# Patient Record
Sex: FEMALE | Race: WHITE | NOT HISPANIC OR LATINO | Employment: OTHER | URBAN - METROPOLITAN AREA
[De-identification: names, ages, dates, MRNs, and addresses within clinical notes are randomized per-mention and may not be internally consistent; named-entity substitution may affect disease eponyms.]

---

## 2017-01-03 RX ORDER — INDACATEROL MALEATE 75 UG/1
CAPSULE ORAL; RESPIRATORY (INHALATION)
Qty: 90 CAP | Refills: 1 | Status: SHIPPED | OUTPATIENT
Start: 2017-01-03 | End: 2017-03-15 | Stop reason: SDUPTHER

## 2017-01-03 NOTE — TELEPHONE ENCOUNTER
Was the patient seen in the last year in this department? Yes     Does patient have an active prescription for medications requested? No     Received Request Via: Pharmacy     Last appt: 3/29/2016    Next appt: none scheduled

## 2017-02-28 ENCOUNTER — HOSPITAL ENCOUNTER (OUTPATIENT)
Dept: LAB | Facility: MEDICAL CENTER | Age: 82
End: 2017-02-28
Attending: NURSE PRACTITIONER
Payer: MEDICARE

## 2017-02-28 ENCOUNTER — TELEPHONE (OUTPATIENT)
Dept: MEDICAL GROUP | Facility: MEDICAL CENTER | Age: 82
End: 2017-02-28

## 2017-02-28 DIAGNOSIS — I10 ESSENTIAL HYPERTENSION, BENIGN: ICD-10-CM

## 2017-02-28 DIAGNOSIS — E11.638: ICD-10-CM

## 2017-02-28 DIAGNOSIS — E78.5 HYPERLIPIDEMIA LDL GOAL <100: ICD-10-CM

## 2017-02-28 DIAGNOSIS — E56.9 VITAMIN DEFICIENCY: ICD-10-CM

## 2017-02-28 LAB
25(OH)D3 SERPL-MCNC: 27 NG/ML (ref 30–100)
ALBUMIN SERPL BCP-MCNC: 4.3 G/DL (ref 3.2–4.9)
ALBUMIN/GLOB SERPL: 1.3 G/DL
ALP SERPL-CCNC: 78 U/L (ref 30–99)
ALT SERPL-CCNC: 21 U/L (ref 2–50)
ANION GAP SERPL CALC-SCNC: 9 MMOL/L (ref 0–11.9)
AST SERPL-CCNC: 19 U/L (ref 12–45)
BILIRUB SERPL-MCNC: 0.5 MG/DL (ref 0.1–1.5)
BUN SERPL-MCNC: 29 MG/DL (ref 8–22)
CALCIUM SERPL-MCNC: 10 MG/DL (ref 8.5–10.5)
CHLORIDE SERPL-SCNC: 107 MMOL/L (ref 96–112)
CHOLEST SERPL-MCNC: 144 MG/DL (ref 100–199)
CO2 SERPL-SCNC: 24 MMOL/L (ref 20–33)
CREAT SERPL-MCNC: 1.14 MG/DL (ref 0.5–1.4)
CREAT UR-MCNC: 144.9 MG/DL
EST. AVERAGE GLUCOSE BLD GHB EST-MCNC: 137 MG/DL
FOLATE SERPL-MCNC: 20.6 NG/ML
GLOBULIN SER CALC-MCNC: 3.3 G/DL (ref 1.9–3.5)
GLUCOSE SERPL-MCNC: 162 MG/DL (ref 65–99)
HBA1C MFR BLD: 6.4 % (ref 0–5.6)
HDLC SERPL-MCNC: 44 MG/DL
LDLC SERPL CALC-MCNC: 52 MG/DL
MICROALBUMIN UR-MCNC: 0.9 MG/DL
MICROALBUMIN/CREAT UR: 6 MG/G (ref 0–30)
POTASSIUM SERPL-SCNC: 4 MMOL/L (ref 3.6–5.5)
PROT SERPL-MCNC: 7.6 G/DL (ref 6–8.2)
SODIUM SERPL-SCNC: 140 MMOL/L (ref 135–145)
TRIGL SERPL-MCNC: 240 MG/DL (ref 0–149)
VIT B12 SERPL-MCNC: >1500 PG/ML (ref 211–911)

## 2017-02-28 PROCEDURE — 80053 COMPREHEN METABOLIC PANEL: CPT

## 2017-02-28 PROCEDURE — 82306 VITAMIN D 25 HYDROXY: CPT | Mod: GA

## 2017-02-28 PROCEDURE — 82607 VITAMIN B-12: CPT

## 2017-02-28 PROCEDURE — 82570 ASSAY OF URINE CREATININE: CPT

## 2017-02-28 PROCEDURE — 36415 COLL VENOUS BLD VENIPUNCTURE: CPT

## 2017-02-28 PROCEDURE — 82746 ASSAY OF FOLIC ACID SERUM: CPT

## 2017-02-28 PROCEDURE — 82043 UR ALBUMIN QUANTITATIVE: CPT

## 2017-02-28 PROCEDURE — 80061 LIPID PANEL: CPT

## 2017-02-28 PROCEDURE — 83036 HEMOGLOBIN GLYCOSYLATED A1C: CPT

## 2017-02-28 NOTE — TELEPHONE ENCOUNTER
----- Message from ALISON Leavitt sent at 2/28/2017  1:55 PM PST -----  Please have pt set appointment to review and discuss treatment for labs.

## 2017-02-28 NOTE — Clinical Note
February 28, 2017        Kerri Newell  [address] [facility] [address]        Dear Kerri:    After careful review of your chart, we have noted you are due for  a follow up appointment.  We request you call our office at [phone] at your earliest convenience and make an appointment.     We look forward to scheduling an appointment for you, so that we may provide you with the safest and most complete medical care.        If you have any questions or concerns, please don't hesitate to call.        Sincerely,        ADILENE Leavitt.    Electronically Signed

## 2017-03-15 ENCOUNTER — OFFICE VISIT (OUTPATIENT)
Dept: MEDICAL GROUP | Facility: MEDICAL CENTER | Age: 82
End: 2017-03-15
Payer: MEDICARE

## 2017-03-15 VITALS
WEIGHT: 183 LBS | DIASTOLIC BLOOD PRESSURE: 68 MMHG | OXYGEN SATURATION: 92 % | BODY MASS INDEX: 28.72 KG/M2 | HEIGHT: 67 IN | SYSTOLIC BLOOD PRESSURE: 128 MMHG | TEMPERATURE: 97.3 F | HEART RATE: 75 BPM

## 2017-03-15 DIAGNOSIS — Z12.31 ENCOUNTER FOR SCREENING MAMMOGRAM FOR MALIGNANT NEOPLASM OF BREAST: ICD-10-CM

## 2017-03-15 DIAGNOSIS — N95.9 POST MENOPAUSAL PROBLEMS: ICD-10-CM

## 2017-03-15 DIAGNOSIS — K21.9 GASTROESOPHAGEAL REFLUX DISEASE WITHOUT ESOPHAGITIS: ICD-10-CM

## 2017-03-15 DIAGNOSIS — E78.5 HYPERLIPIDEMIA LDL GOAL <100: ICD-10-CM

## 2017-03-15 DIAGNOSIS — J01.80 OTHER ACUTE SINUSITIS: ICD-10-CM

## 2017-03-15 DIAGNOSIS — E11.638: ICD-10-CM

## 2017-03-15 DIAGNOSIS — I10 ESSENTIAL HYPERTENSION, BENIGN: ICD-10-CM

## 2017-03-15 DIAGNOSIS — Z12.11 SCREEN FOR COLON CANCER: ICD-10-CM

## 2017-03-15 DIAGNOSIS — N18.30 CKD (CHRONIC KIDNEY DISEASE) STAGE 3, GFR 30-59 ML/MIN (HCC): ICD-10-CM

## 2017-03-15 DIAGNOSIS — E55.9 VITAMIN D DEFICIENCY: ICD-10-CM

## 2017-03-15 DIAGNOSIS — J43.8 OTHER EMPHYSEMA (HCC): ICD-10-CM

## 2017-03-15 PROCEDURE — G8432 DEP SCR NOT DOC, RNG: HCPCS | Performed by: NURSE PRACTITIONER

## 2017-03-15 PROCEDURE — 4040F PNEUMOC VAC/ADMIN/RCVD: CPT | Mod: 8P | Performed by: NURSE PRACTITIONER

## 2017-03-15 PROCEDURE — G8482 FLU IMMUNIZE ORDER/ADMIN: HCPCS | Performed by: NURSE PRACTITIONER

## 2017-03-15 PROCEDURE — 1101F PT FALLS ASSESS-DOCD LE1/YR: CPT | Performed by: NURSE PRACTITIONER

## 2017-03-15 PROCEDURE — G8420 CALC BMI NORM PARAMETERS: HCPCS | Performed by: NURSE PRACTITIONER

## 2017-03-15 PROCEDURE — 1036F TOBACCO NON-USER: CPT | Performed by: NURSE PRACTITIONER

## 2017-03-15 PROCEDURE — 99214 OFFICE O/P EST MOD 30 MIN: CPT | Performed by: NURSE PRACTITIONER

## 2017-03-15 RX ORDER — FLUTICASONE PROPIONATE 50 MCG
2 SPRAY, SUSPENSION (ML) NASAL DAILY
Qty: 3 BOTTLE | Refills: 1 | Status: SHIPPED | OUTPATIENT
Start: 2017-03-15 | End: 2017-05-17 | Stop reason: SDUPTHER

## 2017-03-15 RX ORDER — BENZONATATE 100 MG/1
100 CAPSULE ORAL 3 TIMES DAILY PRN
Qty: 30 CAP | Refills: 0 | Status: SHIPPED | OUTPATIENT
Start: 2017-03-15 | End: 2017-08-10

## 2017-03-15 RX ORDER — FELODIPINE 2.5 MG/1
2.5 TABLET, EXTENDED RELEASE ORAL
Qty: 90 TAB | Refills: 1 | Status: SHIPPED | OUTPATIENT
Start: 2017-03-15 | End: 2017-08-10 | Stop reason: SDUPTHER

## 2017-03-15 RX ORDER — FELODIPINE 10 MG/1
10 TABLET, EXTENDED RELEASE ORAL
Qty: 90 TAB | Refills: 1 | Status: SHIPPED | OUTPATIENT
Start: 2017-03-15 | End: 2017-05-11 | Stop reason: SDUPTHER

## 2017-03-15 RX ORDER — LOSARTAN POTASSIUM AND HYDROCHLOROTHIAZIDE 12.5; 5 MG/1; MG/1
1 TABLET ORAL
Qty: 90 TAB | Refills: 1 | Status: SHIPPED | OUTPATIENT
Start: 2017-03-15 | End: 2017-05-11 | Stop reason: SDUPTHER

## 2017-03-15 RX ORDER — ALBUTEROL SULFATE 90 UG/1
2 AEROSOL, METERED RESPIRATORY (INHALATION) EVERY 6 HOURS PRN
Qty: 3 INHALER | Refills: 1 | Status: SHIPPED | OUTPATIENT
Start: 2017-03-15 | End: 2018-09-17 | Stop reason: SDUPTHER

## 2017-03-15 RX ORDER — AMOXICILLIN AND CLAVULANATE POTASSIUM 875; 125 MG/1; MG/1
1 TABLET, FILM COATED ORAL 2 TIMES DAILY
Qty: 20 TAB | Refills: 0 | Status: SHIPPED | OUTPATIENT
Start: 2017-03-15 | End: 2017-08-10

## 2017-03-15 RX ORDER — ATORVASTATIN CALCIUM 10 MG/1
10 TABLET, FILM COATED ORAL
Qty: 90 TAB | Refills: 1 | Status: SHIPPED | OUTPATIENT
Start: 2017-03-15 | End: 2017-08-10

## 2017-03-15 RX ORDER — AMOXICILLIN 500 MG/1
2000 CAPSULE ORAL ONCE
Qty: 4 CAP | Refills: 1 | Status: SHIPPED | OUTPATIENT
Start: 2017-03-15 | End: 2017-03-15

## 2017-03-15 RX ORDER — RABEPRAZOLE SODIUM 20 MG/1
20 TABLET, DELAYED RELEASE ORAL DAILY
Qty: 90 TAB | Refills: 1 | Status: SHIPPED | OUTPATIENT
Start: 2017-03-15 | End: 2017-05-11 | Stop reason: SDUPTHER

## 2017-03-15 ASSESSMENT — PATIENT HEALTH QUESTIONNAIRE - PHQ9: CLINICAL INTERPRETATION OF PHQ2 SCORE: 0

## 2017-03-15 NOTE — PROGRESS NOTES
Subjective:      Kerri Newell is a 81 y.o. female who presents with No chief complaint on file.            HPI  Seen in f/u for DM.  Feeling poorly.  She has sinus infection.  Coughing up green secretions.  + ear pressure and sinus pressure.  Coughing a lot at nite.  She has used tessalon perles with relief.  Needs refill. No fever, chills or sweating.  SOB and wheezing is worse.  Using her rescue inhaler more.    Needs refills on all meds including her antibx prophylaxis.    She is due her FIT test.  No further colonoscopy needed  She is due her mammo and dexa scan in april.  Will do this summer.  Reviewed lab with pt.  Her CMP is wnl except glucose is 164.  a1c is down from 6.6 to 6.4.    LP shows trg are up from last time to 240.  HDL was normal last time but sl low at 44 now.  She is not exercising now.  Has micky knee OA.  Pain is getting worse.   GFR is dec sl worse than last time.  Not drinking enough water.  Drinks less in winter.    Vitamin d is low at 27.  She is on ca w/d.  Will add d3 supplement  B12, folate, alb/cr ratio is wnl.    Patient Active Problem List    Diagnosis Date Noted   • Esophageal stricture    • Diverticulosis    • Osteopenia    • Hiatal hernia    • Arthritis    • Glaucoma    • Essential hypertension, benign 09/04/2012   • COPD (chronic obstructive pulmonary disease) (CMS-HCC) 04/05/2012   • DM (diabetes mellitus) (CMS-HCC) 07/13/2010   • Hyperlipidemia 07/13/2010   • Preventative health care 07/24/2009   • GERD (gastroesophageal reflux disease) 07/24/2009     Current Outpatient Prescriptions   Medication Sig Dispense Refill   • ARCAPTA NEOHALER 75 MCG Cap INHALE 1 CAPSULE BY MOUTH EVERY DAY 90 Cap 1   • felodipine (PLENDIL) 2.5 MG TABLET SR 24 HR Take 1 Tab by mouth every day. 90 Tab 1   • rabeprazole (ACIPHEX) 20 MG tablet Take 1 Tab by mouth every day. Take on empty stomach in AM 90 Tab 1   • sitagliptin (JANUVIA) 100 MG Tab Take 1 Tab by mouth every day. TAKE 1 TAB BY MOUTH  "EVERY DAY. 90 Tab 1   • felodipine (PLENDIL) 10 MG TABLET SR 24 HR Take 1 Tab by mouth every day. TAKE 1 TAB BY MOUTH EVERY DAY. 90 Tab 1   • fluticasone (FLONASE) 50 MCG/ACT nasal spray Spray 2 Sprays in nose every day. 3 Bottle 1   • albuterol (PROAIR HFA) 108 (90 BASE) MCG/ACT Aero Soln inhalation aerosol Inhale 2 Puffs by mouth every 6 hours as needed for Shortness of Breath. 3 Inhaler 1   • atorvastatin (LIPITOR) 10 MG Tab Take 1 Tab by mouth every day. 90 Tab 1   • losartan-hydrochlorothiazide (HYZAAR) 50-12.5 MG per tablet Take 1 Tab by mouth every day. 90 Tab 1   • metformin (GLUCOPHAGE) 500 MG Tab Take 1 Tab by mouth every day. 90 Tab 1   • acetaminophen (TYLENOL) 325 MG Tab Take 650 mg by mouth every four hours as needed.     • Cyanocobalamin (B-12) 2500 MCG Tab Take  by mouth.     • CALTRATE 600+D PO Take 1 Tab by mouth every day.       No current facility-administered medications for this visit.     Allergies   Allergen Reactions   • Sulfa Drugs        ROS  Review of Systems   Constitutional: Negative.  Negative for fever, chills, weight loss, malaise/fatigue and diaphoresis.   HENT: Negative.  Negative for hearing loss, ear pain, nosebleeds,  sore throat, neck pain, tinnitus and ear discharge.    Respiratory: Negative.  Negative for hemoptysis, stridor.    Cardiovascular: Negative.  Negative for chest pain, palpitations, orthopnea, claudication, leg swelling and PND.   Gastrointestinal: denies nausea, vomiting, diarrhea, constipation, heartburn, melena or hematochezia.  Genitourinary: Denies dysuria, hematuria, urinary incontinence, frequency or urgency.               Objective:     /68 mmHg  Pulse 75  Temp(Src) 36.3 °C (97.3 °F)  Ht 1.702 m (5' 7\")  Wt 83.008 kg (183 lb)  BMI 28.66 kg/m2  SpO2 92%  Breastfeeding? No     Physical Exam  Physical Exam   Vitals reviewed.  Constitutional: oriented to person, place, and time. appears well-developed and well-nourished. No distress. "   Cardiovascular: Normal rate, regular rhythm, normal heart sounds and intact distal pulses.  Exam reveals no gallop and no friction rub.  No murmur heard.  No carotid bruits.   Pulmonary/Chest: Effort normal and breath sounds normal. No stridor. No respiratory distress. no wheezes or rales. exhibits no tenderness.   Musculoskeletal: Normal range of motion. exhibits no edema. micky pedal pulses 2+.  Neurological: alert and oriented to person, place, and time. exhibits normal muscle tone. Coordination normal.   Skin: Skin is warm and dry. no diaphoresis.   Psychiatric: normal mood and affect. behavior is normal.               Assessment/Plan:     1. Other acute sinusitis  benzonatate (TESSALON) 100 MG Cap    amoxicillin-clavulanate (AUGMENTIN) 875-125 MG Tab    tessalon perles prn cough.  augmentin x 10 days.     2. Controlled type 2 diabetes mellitus with other oral complication (CMS-HCC)  sitagliptin (JANUVIA) 100 MG Tab    metformin (GLUCOPHAGE) 500 MG Tab    HEMOGLOBIN A1C    controlled.  refilled all meds.  repeat lab in 4 months.  f/u for lab review   3. CKD (chronic kidney disease) stage 3, GFR 30-59 ml/min  BASIC METABOLIC PANEL    drink more water.  do bmp in 4 months to monitor   4. Essential hypertension, benign  felodipine (PLENDIL) 2.5 MG TABLET SR 24 HR    felodipine (PLENDIL) 10 MG TABLET SR 24 HR    losartan-hydrochlorothiazide (HYZAAR) 50-12.5 MG per tablet    amoxicillin (AMOXIL) 500 MG Cap    controlled.  refilled meds.  f/u 4 months do lab before appt   5. Gastroesophageal reflux disease without esophagitis  rabeprazole (ACIPHEX) 20 MG tablet    refilled all meds   6. Other emphysema (CMS-HCC)  Indacaterol Maleate (ARCAPTA NEOHALER) 75 MCG Cap    fluticasone (FLONASE) 50 MCG/ACT nasal spray    albuterol (PROAIR HFA) 108 (90 BASE) MCG/ACT Aero Soln inhalation aerosol    refilled meds.  followed by pulm   7. Hyperlipidemia LDL goal <100  atorvastatin (LIPITOR) 10 MG Tab    LIPID PROFILE    refilled  meds.  f/u 4 months do lab before f/u.  trg are not controlled.  dec complex carbs in diet.    8. Vitamin D deficiency      add d3 2000 units daily   9. Post menopausal problems  DS-BONE DENSITY STUDY (DEXA)    dexa scan will do this summer with mammo   10. Screen for colon cancer  OCCULT BLOOD FECES IMMUNOASSAY    fit test   11. Encounter for screening mammogram for malignant neoplasm of breast  MA-SCREEN MAMMO W/CAD-BILAT

## 2017-03-15 NOTE — MR AVS SNAPSHOT
"Kerri Newell   3/15/2017 10:15 AM   Office Visit   MRN: [id number]    Department:  [facility]   Dept Phone:  [phone]    Description:  Female : 1935   Provider:  ALISON Leavitt           Allergies as of 3/15/2017     Allergen Noted Reactions    Sulfa Drugs 2009         You were diagnosed with     Other acute sinusitis   [461.8.ICD-9-CM]   tessalon perles prn cough.  augmentin x 10 days.      Controlled type 2 diabetes mellitus with other oral complication (CMS-[facility])   [4628585]   controlled.  refilled all meds.  repeat lab in 4 months.  f/u for lab review    CKD (chronic kidney disease) stage 3, GFR 30-59 ml/min   [268421]       Essential hypertension, benign   [401.1.ICD-9-CM]   controlled.  refilled meds.  f/u 4 months do lab before appt    Gastroesophageal reflux disease without esophagitis   [423562]   refilled all meds    Other emphysema (CMS-HCC)   [492.8.ICD-9-CM]   refilled meds.  followed by pulm    Hyperlipidemia LDL goal <100   [091850]   refilled meds.  f/u 4 months do lab before f/u.  trg are not controlled.  dec complex carbs in diet.     Vitamin D deficiency   [1882933]   add d3 2000 units daily    Post menopausal problems   [952445]   dexa scan will do this summer with mammo    Screen for colon cancer   [690058]   fit test    Encounter for screening mammogram for malignant neoplasm of breast   [906591]         Vital Signs     Blood Pressure Pulse Temperature Height Weight Body Mass Index    128/68 mmHg 75 36.3 °C (97.3 °F) 1.702 m (5' 7\") 83.008 kg (183 lb) 28.66 kg/m2    Oxygen Saturation Breastfeeding? Smoking Status             92% No Former Smoker         Basic Information     Date Of Birth Sex Race Ethnicity Preferred Language    1935 Female White Non- English      Problem List              ICD-10-CM Priority Class Noted - Resolved    Preventative health care Z00.00   2009 - Present    GERD (gastroesophageal reflux disease) " K21.9   7/24/2009 - Present    DM (diabetes mellitus) (CMS-HCC) E11.9   7/13/2010 - Present    Hyperlipidemia E78.5   7/13/2010 - Present    COPD (chronic obstructive pulmonary disease) (CMS-HCC) J44.9   4/5/2012 - Present    Essential hypertension, benign I10   9/4/2012 - Present    Esophageal stricture K22.2   Unknown - Present    Diverticulosis K57.90   Unknown - Present    Osteopenia M85.80   Unknown - Present    Hiatal hernia K44.9   Unknown - Present    Arthritis M19.90   Unknown - Present    Glaucoma H40.9   Unknown - Present      Health Maintenance        Date Due Completion Dates    IMM DTaP/Tdap/Td Vaccine (1 - Tdap) 4/28/1956 ---    IMM ZOSTER VACCINE 4/28/1997 ---    IMM PNEUMOCOCCAL 65+ (ADULT) LOW/MEDIUM RISK SERIES (1 of 2 - PCV13) 4/28/2002 ---    MAMMOGRAM 4/6/2016 4/6/2015, 2/3/2014, 1/14/2013, 9/23/2011, 9/22/2010, 9/4/2009, 9/4/2009, 5/20/2008, 5/20/2008, 5/20/2008, 5/3/2007, 5/3/2007    BONE DENSITY 4/6/2017 4/6/2015, 1/14/2013, 9/22/2010, 5/5/2008, 4/4/2008    RETINAL SCREENING 7/19/2017 7/19/2016 (Done)    Override on 7/19/2016: Done    A1C SCREENING 8/28/2017 2/28/2017, 10/14/2016, 3/18/2016, 8/31/2015, 4/28/2015, 1/2/2015, 7/16/2014, 1/20/2014, 6/18/2013, 12/18/2012, 2/23/2012, 8/17/2011, 4/8/2011, 9/22/2010, 5/28/2009, 10/13/2008    DIABETES MONOFILAMENT / LE EXAM 10/17/2017 10/17/2016 (Done), 7/18/2014 (Done)    Override on 10/17/2016: Done    Override on 7/18/2014: Done    FASTING LIPID PROFILE 2/28/2018 2/28/2017, 3/18/2016, 8/31/2015, 4/28/2015, 1/2/2015, 7/16/2014, 1/20/2014, 6/18/2013, 2/23/2012, 4/8/2011, 7/14/2010, 1/5/2010, 5/28/2009    URINE ACR / MICROALBUMIN 2/28/2018 2/28/2017, 3/18/2016, 4/28/2015, 7/16/2014, 1/20/2014, 6/18/2013, 2/23/2012, 4/8/2011, 7/14/2010, 1/5/2010, 6/3/2009    SERUM CREATININE 2/28/2018 2/28/2017, 10/14/2016, 3/18/2016, 8/31/2015, 4/28/2015, 1/2/2015, 8/7/2013, 6/18/2013, 2/23/2012, 4/8/2011, 7/14/2010, 1/5/2010, 5/28/2009, 10/13/2008    COLONOSCOPY  4/1/2018 4/1/2008, 1/4/2004            Current Immunizations     Influenza Vaccine Adult HD 9/13/2016    Influenza Vaccine Pediatric 11/1/2007    Pneumococcal Vaccine (UF)Historical Data 1/1/2002      Below and/or attached are the medications your provider expects you to take. Review all of your home medications and newly ordered medications with your provider and/or pharmacist. Follow medication instructions as directed by your provider and/or pharmacist. Please keep your medication list with you and share with your provider. Update the information when medications are discontinued, doses are changed, or new medications (including over-the-counter products) are added; and carry medication information at all times in the event of emergency situations     Allergies:  SULFA DRUGS - (reactions not documented)               Medications  Valid as of: March 15, 2017 - 11:01 AM    Generic Name Brand Name Tablet Size Instructions for use    Acetaminophen (Tab) TYLENOL 325 MG Take 650 mg by mouth every four hours as needed.        Albuterol Sulfate (Aero Soln) albuterol 108 (90 BASE) MCG/ACT Inhale 2 Puffs by mouth every 6 hours as needed for Shortness of Breath.        Amoxicillin (Cap) AMOXIL 500 MG Take 4 Caps by mouth Once for 1 dose.        Amoxicillin-Pot Clavulanate (Tab) AUGMENTIN 875-125 MG Take 1 Tab by mouth 2 times a day.        Atorvastatin Calcium (Tab) LIPITOR 10 MG Take 1 Tab by mouth every day.        Benzonatate (Cap) TESSALON 100 MG Take 1 Cap by mouth 3 times a day as needed.        Calcium Carbonate-Vitamin D   Take 1 Tab by mouth every day.        Cyanocobalamin (Tab) B-12 2500 MCG Take  by mouth.        Felodipine (TABLET SR 24 HR) PLENDIL 2.5 MG Take 1 Tab by mouth every day.        Felodipine (TABLET SR 24 HR) PLENDIL 10 MG Take 1 Tab by mouth every day. TAKE 1 TAB BY MOUTH EVERY DAY.        Fluticasone Propionate (Suspension) FLONASE 50 MCG/ACT Spray 2 Sprays in nose every day.        Indacaterol  Maleate (Cap) Indacaterol Maleate 75 MCG Inhale 1 Cap by mouth every day.        Losartan Potassium-HCTZ (Tab) HYZAAR 50-12.5 MG Take 1 Tab by mouth every day.        MetFORMIN HCl (Tab) GLUCOPHAGE 500 MG Take 1 Tab by mouth every day.        RABEprazole Sodium (Tablet Delayed Response) ACIPHEX 20 MG Take 1 Tab by mouth every day. Take on empty stomach in AM        SITagliptin Phosphate (Tab) JANUVIA 100 MG Take 1 Tab by mouth every day. TAKE 1 TAB BY MOUTH EVERY DAY.        .                 Medicines prescribed today were sent to:     [facility] [address] [place] [address] - [id number] [facility] [address]    Phone: [phone] Fax: [phone]    Open 24 Hours?: No      Medication refill instructions:       If your prescription bottle indicates you have medication refills left, it is not necessary to call your provider’s office. Please contact your pharmacy and they will refill your medication.    If your prescription bottle indicates you do not have any refills left, you may request refills at any time through one of the following ways: The online [employer] system (except Urgent Care), by calling your provider’s office, or by asking your pharmacy to contact your provider’s office with a refill request. Medication refills are processed only during regular business hours and may not be available until the next business day. Your provider may request additional information or to have a follow-up visit with you prior to refilling your medication.   *Please Note: Medication refills are assigned a new Rx number when refilled electronically. Your pharmacy may indicate that no refills were authorized even though a new prescription for the same medication is available at the pharmacy. Please request the medicine by name with the pharmacy before contacting your provider for a refill.        Your To Do List     Future Labs/Procedures Complete By Expires    BASIC METABOLIC PANEL  7/15/2017  3/16/2018    HEMOGLOBIN A1C  7/15/2017 3/16/2018    LIPID PROFILE  7/15/2017 3/16/2018    DS-BONE DENSITY STUDY (DEXA)  As directed 9/15/2017    MA-SCREEN MAMMO W/CAD-BILAT  As directed 4/16/2018    OCCULT BLOOD FECES IMMUNOASSAY  As directed 3/16/2018         MyChart Status: Patient Declined

## 2017-03-19 ENCOUNTER — HOSPITAL ENCOUNTER (OUTPATIENT)
Facility: MEDICAL CENTER | Age: 82
End: 2017-03-19
Attending: NURSE PRACTITIONER
Payer: MEDICARE

## 2017-03-19 PROCEDURE — 82274 ASSAY TEST FOR BLOOD FECAL: CPT

## 2017-03-23 DIAGNOSIS — Z12.11 SCREEN FOR COLON CANCER: ICD-10-CM

## 2017-03-23 LAB — HEMOCCULT STL QL IA: NEGATIVE

## 2017-04-06 ENCOUNTER — TELEPHONE (OUTPATIENT)
Dept: MEDICAL GROUP | Facility: MEDICAL CENTER | Age: 82
End: 2017-04-06

## 2017-04-06 DIAGNOSIS — E78.5 HYPERLIPIDEMIA LDL GOAL <100: ICD-10-CM

## 2017-04-06 RX ORDER — PRAVASTATIN SODIUM 20 MG
20 TABLET ORAL DAILY
Qty: 90 TAB | Refills: 0 | Status: SHIPPED | OUTPATIENT
Start: 2017-04-06 | End: 2017-06-05 | Stop reason: SDUPTHER

## 2017-04-06 NOTE — TELEPHONE ENCOUNTER
1. Caller Name: Pt                      Call Back Number: [phone]    2. Message: Pt left message stating she had been in a lot of aches and pains. Her friend had discontinued Lipitor and her aches resolved. Pt discontinued her Lipitor as well and is now almost completely pain free. She would like to stop Lipitor and have it replaced with another med, possibly Pravastatin. Please advise.     3. Patient approves office to leave a detailed voicemail/MyChart message: N\A

## 2017-05-11 DIAGNOSIS — K21.9 GASTROESOPHAGEAL REFLUX DISEASE WITHOUT ESOPHAGITIS: ICD-10-CM

## 2017-05-11 DIAGNOSIS — I10 ESSENTIAL HYPERTENSION, BENIGN: ICD-10-CM

## 2017-05-11 DIAGNOSIS — E11.638: ICD-10-CM

## 2017-05-11 RX ORDER — LOSARTAN POTASSIUM AND HYDROCHLOROTHIAZIDE 12.5; 5 MG/1; MG/1
1 TABLET ORAL
Qty: 90 TAB | Refills: 1 | Status: SHIPPED | OUTPATIENT
Start: 2017-05-11 | End: 2017-08-10 | Stop reason: SDUPTHER

## 2017-05-11 RX ORDER — RABEPRAZOLE SODIUM 20 MG/1
20 TABLET, DELAYED RELEASE ORAL DAILY
Qty: 90 TAB | Refills: 1 | Status: SHIPPED | OUTPATIENT
Start: 2017-05-11 | End: 2017-08-10 | Stop reason: SDUPTHER

## 2017-05-11 RX ORDER — FELODIPINE 10 MG/1
10 TABLET, EXTENDED RELEASE ORAL
Qty: 90 TAB | Refills: 1 | Status: SHIPPED | OUTPATIENT
Start: 2017-05-11 | End: 2017-08-10 | Stop reason: SDUPTHER

## 2017-05-17 DIAGNOSIS — J43.8 OTHER EMPHYSEMA (HCC): ICD-10-CM

## 2017-05-17 RX ORDER — FLUTICASONE PROPIONATE 50 MCG
2 SPRAY, SUSPENSION (ML) NASAL DAILY
Qty: 3 BOTTLE | Refills: 1 | Status: SHIPPED | OUTPATIENT
Start: 2017-05-17 | End: 2017-08-10 | Stop reason: SDUPTHER

## 2017-06-05 DIAGNOSIS — E78.5 HYPERLIPIDEMIA LDL GOAL <100: ICD-10-CM

## 2017-06-05 RX ORDER — PRAVASTATIN SODIUM 20 MG
20 TABLET ORAL DAILY
Qty: 90 TAB | Refills: 0 | Status: SHIPPED | OUTPATIENT
Start: 2017-06-05 | End: 2017-08-10

## 2017-06-12 ENCOUNTER — TELEPHONE (OUTPATIENT)
Dept: MEDICAL GROUP | Facility: MEDICAL CENTER | Age: 82
End: 2017-06-12

## 2017-06-12 DIAGNOSIS — N95.9 POST MENOPAUSAL PROBLEMS: ICD-10-CM

## 2017-06-12 NOTE — TELEPHONE ENCOUNTER
Pt called stated she received a bill for her Vit D lab -pt is asking if there are any other codes you can add on this

## 2017-06-15 ENCOUNTER — HOSPITAL ENCOUNTER (OUTPATIENT)
Dept: RADIOLOGY | Facility: MEDICAL CENTER | Age: 82
End: 2017-06-15
Attending: NURSE PRACTITIONER
Payer: MEDICARE

## 2017-06-15 DIAGNOSIS — Z12.31 ENCOUNTER FOR SCREENING MAMMOGRAM FOR MALIGNANT NEOPLASM OF BREAST: ICD-10-CM

## 2017-06-15 DIAGNOSIS — N95.9 POST MENOPAUSAL PROBLEMS: ICD-10-CM

## 2017-06-15 PROCEDURE — 77080 DXA BONE DENSITY AXIAL: CPT

## 2017-06-15 PROCEDURE — 77063 BREAST TOMOSYNTHESIS BI: CPT

## 2017-07-07 RX ORDER — INDACATEROL MALEATE 75 UG/1
CAPSULE ORAL; RESPIRATORY (INHALATION)
Qty: 90 CAP | Refills: 0 | Status: SHIPPED | OUTPATIENT
Start: 2017-07-07 | End: 2017-08-10 | Stop reason: SDUPTHER

## 2017-07-07 NOTE — TELEPHONE ENCOUNTER
Was the patient seen in the last year in this department? Yes     Does patient have an active prescription for medications requested? Yes     Received Request Via: Pharmacy       Last visit: 03/29/16  Last labs: 02/28/17

## 2017-08-08 ENCOUNTER — HOSPITAL ENCOUNTER (OUTPATIENT)
Dept: LAB | Facility: MEDICAL CENTER | Age: 82
End: 2017-08-08
Attending: NURSE PRACTITIONER
Payer: MEDICARE

## 2017-08-08 DIAGNOSIS — N18.30 CKD (CHRONIC KIDNEY DISEASE) STAGE 3, GFR 30-59 ML/MIN (HCC): ICD-10-CM

## 2017-08-08 DIAGNOSIS — E78.5 HYPERLIPIDEMIA LDL GOAL <100: ICD-10-CM

## 2017-08-08 DIAGNOSIS — E11.638: ICD-10-CM

## 2017-08-08 LAB
ALBUMIN SERPL BCP-MCNC: 4 G/DL (ref 3.2–4.9)
ALBUMIN/GLOB SERPL: 1.2 G/DL
ALP SERPL-CCNC: 77 U/L (ref 30–99)
ALT SERPL-CCNC: 18 U/L (ref 2–50)
ANION GAP SERPL CALC-SCNC: 9 MMOL/L (ref 0–11.9)
AST SERPL-CCNC: 16 U/L (ref 12–45)
BILIRUB SERPL-MCNC: 0.4 MG/DL (ref 0.1–1.5)
BUN SERPL-MCNC: 21 MG/DL (ref 8–22)
CALCIUM SERPL-MCNC: 9.9 MG/DL (ref 8.5–10.5)
CHLORIDE SERPL-SCNC: 106 MMOL/L (ref 96–112)
CHOLEST SERPL-MCNC: 138 MG/DL (ref 100–199)
CO2 SERPL-SCNC: 24 MMOL/L (ref 20–33)
CREAT SERPL-MCNC: 1.1 MG/DL (ref 0.5–1.4)
EST. AVERAGE GLUCOSE BLD GHB EST-MCNC: 140 MG/DL
GFR SERPL CREATININE-BSD FRML MDRD: 48 ML/MIN/1.73 M 2
GLOBULIN SER CALC-MCNC: 3.3 G/DL (ref 1.9–3.5)
GLUCOSE SERPL-MCNC: 155 MG/DL (ref 65–99)
HBA1C MFR BLD: 6.5 % (ref 0–5.6)
HDLC SERPL-MCNC: 41 MG/DL
LDLC SERPL CALC-MCNC: 54 MG/DL
POTASSIUM SERPL-SCNC: 4 MMOL/L (ref 3.6–5.5)
PROT SERPL-MCNC: 7.3 G/DL (ref 6–8.2)
SODIUM SERPL-SCNC: 139 MMOL/L (ref 135–145)
TRIGL SERPL-MCNC: 216 MG/DL (ref 0–149)

## 2017-08-08 PROCEDURE — 80061 LIPID PANEL: CPT

## 2017-08-08 PROCEDURE — 36415 COLL VENOUS BLD VENIPUNCTURE: CPT | Mod: GA

## 2017-08-08 PROCEDURE — 80053 COMPREHEN METABOLIC PANEL: CPT

## 2017-08-08 PROCEDURE — 83036 HEMOGLOBIN GLYCOSYLATED A1C: CPT | Mod: GA

## 2017-08-10 ENCOUNTER — OFFICE VISIT (OUTPATIENT)
Dept: MEDICAL GROUP | Facility: MEDICAL CENTER | Age: 82
End: 2017-08-10
Payer: MEDICARE

## 2017-08-10 VITALS
HEIGHT: 67 IN | BODY MASS INDEX: 27.94 KG/M2 | WEIGHT: 178 LBS | SYSTOLIC BLOOD PRESSURE: 130 MMHG | TEMPERATURE: 97.1 F | DIASTOLIC BLOOD PRESSURE: 72 MMHG | HEART RATE: 71 BPM | OXYGEN SATURATION: 91 %

## 2017-08-10 DIAGNOSIS — E78.5 HYPERLIPIDEMIA LDL GOAL <70: ICD-10-CM

## 2017-08-10 DIAGNOSIS — G89.29 CHRONIC PAIN OF BOTH KNEES: ICD-10-CM

## 2017-08-10 DIAGNOSIS — I10 ESSENTIAL HYPERTENSION, BENIGN: ICD-10-CM

## 2017-08-10 DIAGNOSIS — M15.9 PRIMARY OSTEOARTHRITIS INVOLVING MULTIPLE JOINTS: ICD-10-CM

## 2017-08-10 DIAGNOSIS — J43.8 OTHER EMPHYSEMA (HCC): ICD-10-CM

## 2017-08-10 DIAGNOSIS — K21.9 GASTROESOPHAGEAL REFLUX DISEASE WITHOUT ESOPHAGITIS: ICD-10-CM

## 2017-08-10 DIAGNOSIS — E11.638: ICD-10-CM

## 2017-08-10 DIAGNOSIS — M25.561 CHRONIC PAIN OF BOTH KNEES: ICD-10-CM

## 2017-08-10 DIAGNOSIS — M25.562 CHRONIC PAIN OF BOTH KNEES: ICD-10-CM

## 2017-08-10 PROCEDURE — 99214 OFFICE O/P EST MOD 30 MIN: CPT | Performed by: NURSE PRACTITIONER

## 2017-08-10 RX ORDER — ROSUVASTATIN CALCIUM 10 MG/1
10 TABLET, COATED ORAL EVERY EVENING
Qty: 12 TAB | Refills: 2 | Status: SHIPPED | OUTPATIENT
Start: 2017-08-10 | End: 2017-08-10 | Stop reason: SDUPTHER

## 2017-08-10 RX ORDER — FLUTICASONE PROPIONATE 50 MCG
2 SPRAY, SUSPENSION (ML) NASAL DAILY
Qty: 3 BOTTLE | Refills: 1 | Status: SHIPPED | OUTPATIENT
Start: 2017-08-10 | End: 2018-04-10 | Stop reason: SDUPTHER

## 2017-08-10 RX ORDER — LOSARTAN POTASSIUM AND HYDROCHLOROTHIAZIDE 12.5; 5 MG/1; MG/1
1 TABLET ORAL
Qty: 90 TAB | Refills: 1 | Status: SHIPPED | OUTPATIENT
Start: 2017-08-10 | End: 2018-04-10 | Stop reason: SDUPTHER

## 2017-08-10 RX ORDER — ROSUVASTATIN CALCIUM 10 MG/1
10 TABLET, COATED ORAL
Qty: 12 TAB | Refills: 3 | Status: SHIPPED | OUTPATIENT
Start: 2017-08-10 | End: 2018-04-10

## 2017-08-10 RX ORDER — RABEPRAZOLE SODIUM 20 MG/1
20 TABLET, DELAYED RELEASE ORAL DAILY
Qty: 90 TAB | Refills: 3 | Status: SHIPPED | OUTPATIENT
Start: 2017-08-10 | End: 2018-09-17 | Stop reason: SDUPTHER

## 2017-08-10 RX ORDER — FELODIPINE 2.5 MG/1
2.5 TABLET, EXTENDED RELEASE ORAL
Qty: 90 TAB | Refills: 3 | Status: SHIPPED | OUTPATIENT
Start: 2017-08-10 | End: 2018-09-17 | Stop reason: SDUPTHER

## 2017-08-10 RX ORDER — FELODIPINE 10 MG/1
10 TABLET, EXTENDED RELEASE ORAL
Qty: 90 TAB | Refills: 3 | Status: SHIPPED | OUTPATIENT
Start: 2017-08-10 | End: 2018-09-17 | Stop reason: SDUPTHER

## 2017-08-10 NOTE — PROGRESS NOTES
Subjective:      Kerri Newell is a 82 y.o. female who presents with No chief complaint on file.            HPI  Seen in f/u for DM.  She is having more knee pain d/t OA.  She is having micky knee pain.  She stopped her pravastatin. Her pain resolved.  She has also has similar pain with her lipitor.    She was told in the past that she had RA but went to rheumatology and he siad that she didn't.    Has pain in micky knees, lower back and micky pain.    She is followed by Tamara for her COPD. He has referred to pulm rehab.  She was following her diet closely and lost 10 lbs. Then she went on vacation for the last month and not watching diet.  Gained 5 lb back.  Reviewed lab with pt.h er CMP is wnl except glucose elevated.  Her a1c is up from 6.4 to 6.5.    LP shows trg are better than last time but still high.  She has been eating a lot of fruit.  HDL is down from 44 to 41. prob d/t  Not able to exercise from the knee pain.  LDL is great.  GFR is dec but up from 46 to 48.          Patient Active Problem List    Diagnosis Date Noted   • Esophageal stricture    • Diverticulosis    • Osteopenia    • Hiatal hernia    • Arthritis    • Glaucoma    • Essential hypertension, benign 09/04/2012   • COPD (chronic obstructive pulmonary disease) (CMS-HCC) 04/05/2012   • DM (diabetes mellitus) (CMS-HCC) 07/13/2010   • Hyperlipidemia 07/13/2010   • Preventative health care 07/24/2009   • GERD (gastroesophageal reflux disease) 07/24/2009     Current Outpatient Prescriptions   Medication Sig Dispense Refill   • ARCAPTA NEOHALER 75 MCG Cap INHALE THE CONTENTS OF ONE CAPSULE BY MOUTH ONCE DAILY 90 Cap 0   • fluticasone (FLONASE) 50 MCG/ACT nasal spray Spray 2 Sprays in nose every day. 3 Bottle 1   • losartan-hydrochlorothiazide (HYZAAR) 50-12.5 MG per tablet Take 1 Tab by mouth every day. 90 Tab 1   • metformin (GLUCOPHAGE) 500 MG Tab Take 1 Tab by mouth every day. 90 Tab 1   • rabeprazole (ACIPHEX) 20 MG tablet Take 1 Tab by mouth  "every day. Take on empty stomach in AM 90 Tab 1   • sitagliptin (JANUVIA) 100 MG Tab Take 1 Tab by mouth every day. TAKE 1 TAB BY MOUTH EVERY DAY. 90 Tab 1   • felodipine (PLENDIL) 10 MG TABLET SR 24 HR Take 1 Tab by mouth every day. TAKE 1 TAB BY MOUTH EVERY DAY. 90 Tab 1   • felodipine (PLENDIL) 2.5 MG TABLET SR 24 HR Take 1 Tab by mouth every day. 90 Tab 1   • albuterol (PROAIR HFA) 108 (90 BASE) MCG/ACT Aero Soln inhalation aerosol Inhale 2 Puffs by mouth every 6 hours as needed for Shortness of Breath. 3 Inhaler 1   • acetaminophen (TYLENOL) 325 MG Tab Take 650 mg by mouth every four hours as needed.     • Cyanocobalamin (B-12) 2500 MCG Tab Take  by mouth.     • CALTRATE 600+D PO Take 1 Tab by mouth every day.       No current facility-administered medications for this visit.     Allergies   Allergen Reactions   • Sulfa Drugs        ROS  Review of Systems   Constitutional: Negative.  Negative for fever, chills, weight loss, malaise/fatigue and diaphoresis.   HENT: Negative.  Negative for hearing loss, ear pain, nosebleeds, congestion, sore throat, neck pain, tinnitus and ear discharge.    Respiratory: Negative.  Negative for cough, hemoptysis, sputum production,  wheezing and stridor.    Cardiovascular: Negative.  Negative for chest pain, palpitations, orthopnea, claudication, leg swelling and PND.   Gastrointestinal: denies nausea, vomiting, diarrhea, constipation, heartburn, melena or hematochezia.  Genitourinary: Denies dysuria, hematuria,  frequency or urgency.  +urinary incontinence,         Objective:     /72 mmHg  Pulse 71  Temp(Src) 36.2 °C (97.1 °F)  Ht 1.702 m (5' 7\")  Wt 80.74 kg (178 lb)  BMI 27.87 kg/m2  SpO2 91%  Breastfeeding? No     Physical Exam      Physical Exam   Vitals reviewed.  Constitutional: oriented to person, place, and time. appears well-developed and well-nourished. No distress.   Cardiovascular: Normal rate, regular rhythm, normal heart sounds and intact distal pulses. "  Exam reveals no gallop and no friction rub.  No murmur heard.  No carotid bruits.   Pulmonary/Chest: Effort normal and breath sounds normal. No stridor. No respiratory distress. no wheezes or rales. exhibits no tenderness.   Musculoskeletal: Normal range of motion. exhibits no edema. micky pedal pulses 2+.  LNeurological: alert and oriented to person, place, and time. exhibits normal muscle tone. Coordination normal.   Skin: Skin is warm and dry. no diaphoresis.   Psychiatric: normal mood and affect. behavior is normal.            Assessment/Plan:     1. Hyperlipidemia LDL goal <70  COMP METABOLIC PANEL    LIPID PROFILE    CREATINE KINASE    intolerant of pravastatin nad lipitor.  will try taking crestor 3x/wk.  recheck lab in 2 mnoths.  f/u for review. if has s/e no further statins   2. Controlled type 2 diabetes mellitus with other oral complication (CMS-HCC)  sitagliptin (JANUVIA) 100 MG Tab    metformin (GLUCOPHAGE) 500 MG Tab    controlled but a1c up sl.   refilled all meds.  repeat lab in 4 months.  f/u for lab review will order lab at the 2 month f/u appt   3. Essential hypertension, benign  losartan-hydrochlorothiazide (HYZAAR) 50-12.5 MG per tablet    felodipine (PLENDIL) 2.5 MG TABLET SR 24 HR    felodipine (PLENDIL) 10 MG TABLET SR 24 HR    controlled.  refilled meds.  f/u 4 months do lab before appt   4. Gastroesophageal reflux disease without esophagitis  rabeprazole (ACIPHEX) 20 MG tablet    refilled all meds   5. Other emphysema (CMS-HCC)  fluticasone (FLONASE) 50 MCG/ACT nasal spray    refilled meds.  followed by pulm   6. Chronic pain of both knees      will check for RA.  do lab in 2 onths.     7. Primary osteoarthritis involving multiple joints  CCP ANTIBODY    RHEUMATOID ARTHRITIS FACTOR

## 2017-08-10 NOTE — MR AVS SNAPSHOT
"        Kerri Sherwoodtammy   8/10/2017 10:15 AM   Office Visit   MRN: [id number]    Department:  [facility]   Dept Phone:  [phone]    Description:  Female : 1935   Provider:  ALISON Leavitt           Allergies as of 8/10/2017     Allergen Noted Reactions    Sulfa Drugs 2009         You were diagnosed with     Hyperlipidemia LDL goal <70   [312262]   intolerant of pravastatin nad lipitor.  will try taking crestor 3x/wk.  recheck lab in 2 mnoths.  f/u for review. if has s/e no further statins    Controlled type 2 diabetes mellitus with other oral complication (CMS-HCC)   [8607281]   controlled but a1c up sl.   refilled all meds.  repeat lab in 4 months.  f/u for lab review will order lab at the 2 month f/u appt    Essential hypertension, benign   [401.1.ICD-9-CM]   controlled.  refilled meds.  f/u 4 months do lab before appt    Gastroesophageal reflux disease without esophagitis   [143020]   refilled all meds    Other emphysema (CMS-[facility])   [492.8.ICD-9-CM]   refilled meds.  followed by pulm    Chronic pain of both knees   [0071928]   will check for RA.  do lab in 2 [facility].      Primary osteoarthritis involving multiple joints   [1834228]         Vital Signs     Blood Pressure Pulse Temperature Height Weight Body Mass Index    130/72 mmHg 71 36.2 °C (97.1 °F) 1.702 m (5' 7\") 80.74 kg (178 lb) 27.87 kg/m2    Oxygen Saturation Breastfeeding? Smoking Status             91% No Former Smoker         Basic Information     Date Of Birth Sex Race Ethnicity Preferred Language    1935 Female White Non- English      Problem List              ICD-10-CM Priority Class Noted - Resolved    Preventative health care Z00.00   2009 - Present    GERD (gastroesophageal reflux disease) K21.9   2009 - Present    DM (diabetes mellitus) (CMS-HCC) E11.9   2010 - Present    Hyperlipidemia E78.5   2010 - Present    COPD (chronic obstructive pulmonary disease) (CMS-HCC) J44.9  "  4/5/2012 - Present    Essential hypertension, benign I10   9/4/2012 - Present    Esophageal stricture K22.2   Unknown - Present    Diverticulosis K57.90   Unknown - Present    Osteopenia M85.80   Unknown - Present    Hiatal hernia K44.9   Unknown - Present    Arthritis M19.90   Unknown - Present    Glaucoma H40.9   Unknown - Present      Health Maintenance        Date Due Completion Dates    IMM DTaP/Tdap/Td Vaccine (1 - Tdap) 4/28/1956 ---    IMM ZOSTER VACCINE 4/28/1997 ---    IMM PNEUMOCOCCAL 65+ (ADULT) LOW/MEDIUM RISK SERIES (2 of 2 - PPSV23) 1/2/2015 1/2/2014, 1/2/2002    RETINAL SCREENING 7/19/2017 7/19/2016 (Done)    Override on 7/19/2016: Done    IMM INFLUENZA (1) 9/1/2017 9/13/2016, 11/1/2007    DIABETES MONOFILAMENT / LE EXAM 10/17/2017 10/17/2016 (Done), 7/18/2014 (Done)    Override on 10/17/2016: Done    Override on 7/18/2014: Done    A1C SCREENING 2/8/2018 8/8/2017, 2/28/2017, 10/14/2016, 3/18/2016, 8/31/2015, 4/28/2015, 1/2/2015, 7/16/2014, 1/20/2014, 6/18/2013, 12/18/2012, 2/23/2012, 8/17/2011, 4/8/2011, 9/22/2010, 5/28/2009, 10/13/2008    URINE ACR / MICROALBUMIN 2/28/2018 2/28/2017, 3/18/2016, 4/28/2015, 7/16/2014, 1/20/2014, 6/18/2013, 2/23/2012, 4/8/2011, 7/14/2010, 1/5/2010, 6/3/2009    COLONOSCOPY 4/1/2018 4/1/2008, 1/4/2004    MAMMOGRAM 6/15/2018 6/15/2017, 4/6/2015, 2/3/2014, 1/14/2013, 9/23/2011, 9/22/2010, 9/4/2009, 9/4/2009, 5/20/2008, 5/20/2008, 5/20/2008, 5/3/2007, 5/3/2007    FASTING LIPID PROFILE 8/8/2018 8/8/2017, 2/28/2017, 3/18/2016, 8/31/2015, 4/28/2015, 1/2/2015, 7/16/2014, 1/20/2014, 6/18/2013, 2/23/2012, 4/8/2011, 7/14/2010, 1/5/2010, 5/28/2009    SERUM CREATININE 8/8/2018 8/8/2017, 2/28/2017, 10/14/2016, 3/18/2016, 8/31/2015, 4/28/2015, 1/2/2015, 8/7/2013, 6/18/2013, 2/23/2012, 4/8/2011, 7/14/2010, 1/5/2010, 5/28/2009, 10/13/2008    BONE DENSITY 6/15/2019 6/15/2017, 4/6/2015, 1/14/2013, 9/22/2010, 5/5/2008, 4/4/2008            Current Immunizations     13-VALENT PCV PREVNAR  1/2/2014    Influenza Vaccine Adult HD 9/13/2016    Influenza Vaccine Pediatric 11/1/2007    Pneumococcal Vaccine (UF)Historical Data 1/1/2002    Pneumococcal polysaccharide vaccine (PPSV-23) 1/2/2002      Below and/or attached are the medications your provider expects you to take. Review all of your home medications and newly ordered medications with your provider and/or pharmacist. Follow medication instructions as directed by your provider and/or pharmacist. Please keep your medication list with you and share with your provider. Update the information when medications are discontinued, doses are changed, or new medications (including over-the-counter products) are added; and carry medication information at all times in the event of emergency situations     Allergies:  SULFA DRUGS - (reactions not documented)               Medications  Valid as of: August 10, 2017 - 11:33 AM    Generic Name Brand Name Tablet Size Instructions for use    Acetaminophen (Tab) TYLENOL 325 MG Take 650 mg by mouth every four hours as needed.        Albuterol Sulfate (Aero Soln) albuterol 108 (90 BASE) MCG/ACT Inhale 2 Puffs by mouth every 6 hours as needed for Shortness of Breath.        Calcium Carbonate-Vitamin D   Take 1 Tab by mouth every day.        Cyanocobalamin (Tab) B-12 2500 MCG Take  by mouth.        Felodipine (TABLET SR 24 HR) PLENDIL 2.5 MG Take 1 Tab by mouth every day.        Felodipine (TABLET SR 24 HR) PLENDIL 10 MG Take 1 Tab by mouth every day. TAKE 1 TAB BY MOUTH EVERY DAY.        Fluticasone Propionate (Suspension) FLONASE 50 MCG/ACT Spray 2 Sprays in nose every day.        Indacaterol Maleate (Cap) Indacaterol Maleate 75 MCG Inhale 1 Capsule by mouth every day.        Losartan Potassium-HCTZ (Tab) HYZAAR 50-12.5 MG Take 1 Tab by mouth every day.        MetFORMIN HCl (Tab) GLUCOPHAGE 500 MG Take 1 Tab by mouth every day.        RABEprazole Sodium (Tablet Delayed Response) ACIPHEX 20 MG Take 1 Tab by mouth every  day. Take on empty stomach in AM        Rosuvastatin Calcium (Tab) CRESTOR 10 MG Take 1 Tab by mouth every evening.        SITagliptin Phosphate (Tab) JANUVIA 100 MG Take 1 Tab by mouth every day. TAKE 1 TAB BY MOUTH EVERY DAY.        .                 Medicines prescribed today were sent to:     [facility] [address] [place], [place] [address], [place] - [address] AT [facility] [address] [place]    [address] [place] [id number]    Phone: [phone] Fax: [phone]    Open 24 Hours?: No      Medication refill instructions:       If your prescription bottle indicates you have medication refills left, it is not necessary to call your provider’s office. Please contact your pharmacy and they will refill your medication.    If your prescription bottle indicates you do not have any refills left, you may request refills at any time through one of the following ways: The online [email or web address] system (except Urgent Care), by calling your provider’s office, or by asking your pharmacy to contact your provider’s office with a refill request. Medication refills are processed only during regular business hours and may not be available until the next business day. Your provider may request additional information or to have a follow-up visit with you prior to refilling your medication.   *Please Note: Medication refills are assigned a new Rx number when refilled electronically. Your pharmacy may indicate that no refills were authorized even though a new prescription for the same medication is available at the pharmacy. Please request the medicine by name with the pharmacy before contacting your provider for a refill.        Your To Do List     Future Labs/Procedures Complete By Expires    CCP ANTIBODY  As directed 8/11/2018    COMP METABOLIC PANEL  As directed 8/11/2018    CREATINE KINASE  As directed 8/10/2018    LIPID PROFILE  As directed 8/11/2018    RHEUMATOID ARTHRITIS FACTOR  As directed 8/10/2018         [email or web address] Status:  Patient Declined

## 2017-09-05 DIAGNOSIS — E11.638: ICD-10-CM

## 2017-09-05 RX ORDER — SITAGLIPTIN 100 MG/1
TABLET, FILM COATED ORAL
Qty: 90 TAB | Refills: 0 | Status: SHIPPED | OUTPATIENT
Start: 2017-09-05 | End: 2018-09-17 | Stop reason: SDUPTHER

## 2017-09-06 ENCOUNTER — TELEPHONE (OUTPATIENT)
Dept: MEDICAL GROUP | Facility: MEDICAL CENTER | Age: 82
End: 2017-09-06

## 2017-09-06 NOTE — TELEPHONE ENCOUNTER
1. Caller Name: Pt                      Call Back Number: [phone] (home)     2. Message: Pt called stating she is still receiving a bill for Vit D blood test. I tried contacting billing office, but they were closed for training purposes. I left a message and requested call back to see why code review documentation sent in June was not accepted.     3. Patient approves office to leave a detailed voicemail/MyChart message: N\A

## 2017-09-08 NOTE — TELEPHONE ENCOUNTER
Pt called back and requested to have labs faxed to Dr. Thurston (Cardiology) at [phone]. Records faxed.

## 2017-09-26 ENCOUNTER — HOSPITAL ENCOUNTER (OUTPATIENT)
Dept: RADIOLOGY | Facility: MEDICAL CENTER | Age: 82
End: 2017-09-26
Attending: EMERGENCY MEDICINE
Payer: MEDICARE

## 2017-09-26 ENCOUNTER — OFFICE VISIT (OUTPATIENT)
Dept: URGENT CARE | Facility: PHYSICIAN GROUP | Age: 82
End: 2017-09-26
Payer: MEDICARE

## 2017-09-26 VITALS
TEMPERATURE: 98.8 F | DIASTOLIC BLOOD PRESSURE: 78 MMHG | WEIGHT: 184 LBS | BODY MASS INDEX: 28.88 KG/M2 | SYSTOLIC BLOOD PRESSURE: 132 MMHG | HEIGHT: 67 IN | OXYGEN SATURATION: 92 % | RESPIRATION RATE: 20 BRPM | HEART RATE: 80 BPM

## 2017-09-26 DIAGNOSIS — J44.1 CHRONIC OBSTRUCTIVE PULMONARY DISEASE WITH ACUTE EXACERBATION (HCC): ICD-10-CM

## 2017-09-26 DIAGNOSIS — J10.1 INFLUENZA B: ICD-10-CM

## 2017-09-26 LAB
FLUAV+FLUBV AG SPEC QL IA: NORMAL
INT CON NEG: NEGATIVE
INT CON POS: POSITIVE

## 2017-09-26 PROCEDURE — 87804 INFLUENZA ASSAY W/OPTIC: CPT | Performed by: EMERGENCY MEDICINE

## 2017-09-26 PROCEDURE — 99204 OFFICE O/P NEW MOD 45 MIN: CPT | Performed by: EMERGENCY MEDICINE

## 2017-09-26 PROCEDURE — 71020 DX-CHEST-2 VIEWS: CPT

## 2017-09-26 RX ORDER — PREDNISONE 10 MG/1
10 TABLET ORAL
Qty: 18 TAB | Refills: 0 | Status: SHIPPED | OUTPATIENT
Start: 2017-09-26 | End: 2017-10-01

## 2017-09-26 RX ORDER — DOXYCYCLINE HYCLATE 100 MG
100 TABLET ORAL 2 TIMES DAILY
Qty: 20 TAB | Refills: 0 | Status: SHIPPED | OUTPATIENT
Start: 2017-09-26 | End: 2018-04-10

## 2017-09-26 RX ORDER — ACETAMINOPHEN 160 MG
TABLET,DISINTEGRATING ORAL
COMMUNITY

## 2017-09-26 ASSESSMENT — ENCOUNTER SYMPTOMS
EYE DISCHARGE: 0
MYALGIAS: 0
NECK PAIN: 0
NAUSEA: 0
SPUTUM PRODUCTION: 1
COUGH: 1
EYE REDNESS: 0
SPEECH CHANGE: 0
HEADACHES: 0
SHORTNESS OF BREATH: 1
SENSORY CHANGE: 0
CHILLS: 0
ABDOMINAL PAIN: 0
FEVER: 0
NERVOUS/ANXIOUS: 1
VOMITING: 0

## 2017-09-26 NOTE — PROGRESS NOTES
Subjective:      Kerri Newell is a 82 y.o. female who presents with No chief complaint on file.            HPI patient is a pleasant 82-year-old female complaining of not feeling well patient has had diffuse muscle aches and ear pain. She did get a flu shot already this fall. She was having a hard time breathing last night she used her 's nebulizer. She has a history of COPD and currently coughing up green phlegm. She denies any history of pneumonia he denies any fever chills nausea or vomiting. .  Allergies   Allergen Reactions   • Sulfa Drugs      Social History     Social History   • Marital status:      Spouse name: N/A   • Number of children: N/A   • Years of education: N/A     Occupational History   • Not on file.     Social History Main Topics   • Smoking status: Former Smoker     Packs/day: 2.00     Years: 56.00     Types: Cigarettes     Quit date: 4/5/1991   • Smokeless tobacco: Never Used   • Alcohol use 0.0 oz/week      Comment: rare   • Drug use: No   • Sexual activity: Not Currently     Other Topics Concern   • Not on file     Social History Narrative   • No narrative on file     Past Medical History:   Diagnosis Date   • Arthritis     osteoarthritis   • CATARACT    • COPD (chronic obstructive pulmonary disease) (CMS-HCC)    • Diverticulosis    • DM (diabetes mellitus) (CMS-HCC)    • Dyslipidemia    • Esophageal stricture     with dilation   • Glaucoma    • Hiatal hernia    • HTN (hypertension)    • Osteopenia    • Vitamin D deficiency      Current Outpatient Prescriptions on File Prior to Visit   Medication Sig Dispense Refill   • JANUVIA 100 MG Tab TAKE 1 TABLET BY MOUTH ONCE DAILY 90 Tab 0   • metformin (GLUCOPHAGE) 500 MG Tab Take 1 Tab by mouth every day. 90 Tab 1   • losartan-hydrochlorothiazide (HYZAAR) 50-12.5 MG per tablet Take 1 Tab by mouth every day. 90 Tab 1   • felodipine (PLENDIL) 2.5 MG TABLET SR 24 HR Take 1 Tab by mouth every day. 90 Tab 3   • felodipine  "(PLENDIL) 10 MG TABLET SR 24 HR Take 1 Tab by mouth every day. TAKE 1 TAB BY MOUTH EVERY DAY. 90 Tab 3   • rabeprazole (ACIPHEX) 20 MG tablet Take 1 Tab by mouth every day. Take on empty stomach in AM 90 Tab 3   • fluticasone (FLONASE) 50 MCG/ACT nasal spray Spray 2 Sprays in nose every day. 3 Bottle 1   • Indacaterol Maleate (ARCAPTA NEOHALER) 75 MCG Cap Inhale 1 Capsule by mouth every day. 90 Cap 3   • rosuvastatin (CRESTOR) 10 MG Tab Take 1 Tab by mouth every 48 hours. 12 Tab 3   • albuterol (PROAIR HFA) 108 (90 BASE) MCG/ACT Aero Soln inhalation aerosol Inhale 2 Puffs by mouth every 6 hours as needed for Shortness of Breath. 3 Inhaler 1   • acetaminophen (TYLENOL) 325 MG Tab Take 650 mg by mouth every four hours as needed.     • Cyanocobalamin (B-12) 2500 MCG Tab Take  by mouth.     • CALTRATE 600+D PO Take 1 Tab by mouth every day.       No current facility-administered medications on file prior to visit.    .  Family History   Problem Relation Age of Onset   • Cancer Mother      lung CA.   • Hypertension Mother    • Stroke Mother    • Cancer Father      throat and brain   • Diabetes Father    • Hypertension Father    • Stroke Father      Review of Systems   Constitutional: Negative for chills and fever.   Eyes: Negative for discharge and redness.   Respiratory: Positive for cough, sputum production and shortness of breath.    Cardiovascular: Negative for chest pain.   Gastrointestinal: Negative for abdominal pain, nausea and vomiting.   Genitourinary: Negative.    Musculoskeletal: Negative for myalgias and neck pain.   Skin: Negative for rash.   Neurological: Negative for sensory change, speech change and headaches.   Psychiatric/Behavioral: The patient is nervous/anxious.           Objective:     Blood pressure 132/78, pulse 80, temperature 37.1 °C (98.8 °F), resp. rate 20, height 1.702 m (5' 7\"), weight 83.5 kg (184 lb), SpO2 92 %.      Physical Exam   Constitutional: She appears well-developed and " well-nourished. She appears distressed.   HENT:   Head: Normocephalic and atraumatic.   Right Ear: External ear normal.   Nose: Nose normal.   TMs are nonerythematous canals have a small amount of cerumen.   Eyes: Conjunctivae are normal. Right eye exhibits no discharge.   Cardiovascular: Normal rate.    Pulmonary/Chest: Breath sounds normal. She is in respiratory distress. She has no wheezes. She has no rales.   Abdominal: She exhibits no distension.   Musculoskeletal: Normal range of motion. She exhibits no edema or tenderness.   Neurological: She is alert.   Skin: Skin is warm and dry. No rash noted. She is not diaphoretic.   Psychiatric: She has a normal mood and affect. Her behavior is normal.          Flu: Positive influenza B     Chest x-ray: No pneumonia    Repeat pulse oximetry 95% on room air.     Assessment/Plan:     Diagnosis: COPD exacerbation                         Influenza B      Patient just recently [place] from a [place] trip and plane flight with greater than 48 hours of diffuse muscle aches. She also has a productive cough with her COPD. Patient is too late to initiate Tamiflu her symptoms been going on approximately 50+ hours.    I am recommending the patient initiate/ continue hydration efforts including the use of a vaporizer/humidifier/ netti pot. I also recommend the pt, initiate Mucinex. In addition the patient will initiate the prescribed prescription medication/s: Prednisone rapid taper I will also initiate doxycycline 100 mg twice a day avoid the sun.. Patient has a inhaler at home which she will use as needed.. If the patient's condition exacerbates with worsening dysphagia, shortness of breath, uncontrolled fever, headache or chest pressure he/she will return immediately to the urgent care or go to  the emergency department for further evaluation.    MADHU Calderon

## 2017-10-12 ENCOUNTER — OFFICE VISIT (OUTPATIENT)
Dept: MEDICAL GROUP | Facility: MEDICAL CENTER | Age: 82
End: 2017-10-12
Payer: MEDICARE

## 2017-10-12 VITALS
HEART RATE: 76 BPM | TEMPERATURE: 96.8 F | DIASTOLIC BLOOD PRESSURE: 70 MMHG | SYSTOLIC BLOOD PRESSURE: 130 MMHG | OXYGEN SATURATION: 85 %

## 2017-10-12 DIAGNOSIS — R06.02 SOB (SHORTNESS OF BREATH): ICD-10-CM

## 2017-10-12 DIAGNOSIS — J44.1 COPD EXACERBATION (HCC): ICD-10-CM

## 2017-10-12 DIAGNOSIS — R05.9 COUGH: ICD-10-CM

## 2017-10-12 DIAGNOSIS — J43.8 OTHER EMPHYSEMA (HCC): ICD-10-CM

## 2017-10-12 DIAGNOSIS — R09.02 HYPOXIA: ICD-10-CM

## 2017-10-12 PROCEDURE — 99214 OFFICE O/P EST MOD 30 MIN: CPT | Performed by: NURSE PRACTITIONER

## 2017-10-12 RX ORDER — DOXYCYCLINE HYCLATE 100 MG
100 TABLET ORAL 2 TIMES DAILY
Qty: 20 TAB | Refills: 0 | Status: SHIPPED | OUTPATIENT
Start: 2017-10-12 | End: 2018-04-10

## 2017-10-12 RX ORDER — PREDNISONE 20 MG/1
TABLET ORAL
Qty: 9 TAB | Refills: 0 | Status: SHIPPED | OUTPATIENT
Start: 2017-10-12 | End: 2017-10-16 | Stop reason: SDUPTHER

## 2017-10-12 NOTE — PROGRESS NOTES
Subjective:     Kerri Newell is a 82 y.o. female who presents with No chief complaint on file.  .    HPI:   Seen in f/u for INFLUENZA B.  She has been ill for 3 weeks. Went to  and dx with flu b.  She got medrol and antibx.  That usually helps.  She has been using neb every 6 hr.  She is using o2 12 hrs daily.  + SOB worse than she has ever had.  She was dr bell her allergist.  He started her on another antibx.  She is still on that.    Upon arrival today her o2 sat was low.   It is now like that all the time.  Over the last several weeks she cannot get better. She is coughing up thick green secretions until wed.  Sx started clearing up yesterday.    Today she is coughing up yellow green.  It was a dark green thick secretions.  She is using his neb.  She will cough up lots of secretions.   + fatigue.    She was 85% on o2 sat upon arrival today.  She is followed by Dr Guillen but he is retiring.  He referred her to another pulm.  He has told her she has sleep apnea.  She is on CPAP.  It doesn't help unless she is on o2.  She used it for a year but couldn't get to sleep well.     She is still congested.  Secretions are lightening up.       Patient Active Problem List    Diagnosis Date Noted   • Esophageal stricture    • Diverticulosis    • Osteopenia    • Hiatal hernia    • Arthritis    • Glaucoma    • Essential hypertension, benign 09/04/2012   • COPD (chronic obstructive pulmonary disease) (CMS-HCC) 04/05/2012   • DM (diabetes mellitus) (CMS-HCC) 07/13/2010   • Hyperlipidemia 07/13/2010   • Preventative health care 07/24/2009   • GERD (gastroesophageal reflux disease) 07/24/2009       Current medicines (including changes today)  Current Outpatient Prescriptions   Medication Sig Dispense Refill   • doxycycline (VIBRAMYCIN) 100 MG Tab Take 1 Tab by mouth 2 times a day. 20 Tab 0   • predniSONE (DELTASONE) 20 MG Tab Prednisone 20 mg 2 tabs daily x 3 days, 1 tab daily x 3 days 1/2 tab x 3 days. 9 Tab 0   •  Cholecalciferol (VITAMIN D3) 2000 UNIT Cap Take  by mouth.     • doxycycline (VIBRAMYCIN) 100 MG Tab Take 1 Tab by mouth 2 times a day. 20 Tab 0   • JANUVIA 100 MG Tab TAKE 1 TABLET BY MOUTH ONCE DAILY 90 Tab 0   • metformin (GLUCOPHAGE) 500 MG Tab Take 1 Tab by mouth every day. 90 Tab 1   • losartan-hydrochlorothiazide (HYZAAR) 50-12.5 MG per tablet Take 1 Tab by mouth every day. 90 Tab 1   • felodipine (PLENDIL) 2.5 MG TABLET SR 24 HR Take 1 Tab by mouth every day. 90 Tab 3   • felodipine (PLENDIL) 10 MG TABLET SR 24 HR Take 1 Tab by mouth every day. TAKE 1 TAB BY MOUTH EVERY DAY. 90 Tab 3   • rabeprazole (ACIPHEX) 20 MG tablet Take 1 Tab by mouth every day. Take on empty stomach in AM 90 Tab 3   • fluticasone (FLONASE) 50 MCG/ACT nasal spray Spray 2 Sprays in nose every day. 3 Bottle 1   • Indacaterol Maleate (ARCAPTA NEOHALER) 75 MCG Cap Inhale 1 Capsule by mouth every day. 90 Cap 3   • rosuvastatin (CRESTOR) 10 MG Tab Take 1 Tab by mouth every 48 hours. 12 Tab 3   • albuterol (PROAIR HFA) 108 (90 BASE) MCG/ACT Aero Soln inhalation aerosol Inhale 2 Puffs by mouth every 6 hours as needed for Shortness of Breath. 3 Inhaler 1   • acetaminophen (TYLENOL) 325 MG Tab Take 650 mg by mouth every four hours as needed.     • Cyanocobalamin (B-12) 2500 MCG Tab Take  by mouth.     • CALTRATE 600+D PO Take 1 Tab by mouth every day.       No current facility-administered medications for this visit.        Allergies   Allergen Reactions   • Sulfa Drugs        ROS  Constitutional: Negative. Negative for fever, chills, weight loss, malaise/fatigue and diaphoresis.   HENT: Negative. Negative for hearing loss, ear pain, nosebleeds,  sore throat, neck pain, tinnitus and ear discharge.   Respiratory: Negative. Negative for hemoptysis, stridor.   Cardiovascular: Negative. Negative for chest pain, palpitations, orthopnea, claudication, leg swelling and PND.   Gastrointestinal: Denies nausea, vomiting, diarrhea, constipation,  heartburn, melena or hematochezia.  Genitourinary: Denies dysuria, hematuria, urinary incontinence, frequency or urgency.        Objective:     Blood pressure 130/70, pulse 76, temperature 36 °C (96.8 °F), SpO2 (!) 85 %, not currently breastfeeding. There is no height or weight on file to calculate BMI.    Physical Exam:  Vitals reviewed.  Constitutional: Oriented to person, place, and time. appears well-developed and well-nourished. No distress.   Cardiovascular: Normal rate, regular rhythm, normal heart sounds and intact distal pulses. Exam reveals no gallop and no friction rub. No murmur heard. No carotid bruits.   Pulmonary/Chest: Effort normal and breath sounds normal. No stridor. No respiratory distress. no wheezes or rales. exhibits no tenderness.   Musculoskeletal: Normal range of motion. exhibits no edema. micky pedal pulses 2+.  Lymphadenopathy: No cervical or supraclavicular adenopathy.   Neurological: Alert and oriented to person, place, and time. exhibits normal muscle tone.  Skin: Skin is warm and dry. No diaphoresis.   Psychiatric: Normal mood and affect. Behavior is normal.      Assessment and Plan:     The following treatment plan was discussed:    1. COPD exacerbation (CMS-[facility])  doxycycline (VIBRAMYCIN) 100 MG Tab    predniSONE (DELTASONE) 20 MG Tab    finish current antibx.  if not significantly improved do prednisone taper and doxy.  f/u 1 week.  o2 2-3 l/min 24/7 for now.  get her a portable tank.    2. Hypoxia  doxycycline (VIBRAMYCIN) 100 MG Tab    predniSONE (DELTASONE) 20 MG Tab   3. SOB (shortness of breath)     4. Cough     5. Other emphysema (CMS-HCC)           Followup: Return in about 1 week (around 10/19/2017).

## 2017-10-16 ENCOUNTER — TELEPHONE (OUTPATIENT)
Dept: MEDICAL GROUP | Facility: MEDICAL CENTER | Age: 82
End: 2017-10-16

## 2017-10-16 DIAGNOSIS — J44.1 COPD EXACERBATION (HCC): ICD-10-CM

## 2017-10-16 DIAGNOSIS — R09.02 HYPOXIA: ICD-10-CM

## 2017-10-16 RX ORDER — PREDNISONE 20 MG/1
TABLET ORAL
Qty: 12 TAB | Refills: 0 | Status: SHIPPED | OUTPATIENT
Start: 2017-10-16 | End: 2018-04-10

## 2017-10-16 NOTE — TELEPHONE ENCOUNTER
1. Caller Name: Pt                      Call Back Number: [phone]    2. Message: Pt left message Friday stating the qty needs to be for 12 and not 9. She would like new rx sent to pharmacy.     3. Patient approves office to leave a detailed voicemail/MyChart message: N\A

## 2017-10-24 ENCOUNTER — OFFICE VISIT (OUTPATIENT)
Dept: MEDICAL GROUP | Facility: MEDICAL CENTER | Age: 82
End: 2017-10-24
Payer: MEDICARE

## 2017-10-24 VITALS
HEART RATE: 64 BPM | HEIGHT: 67 IN | OXYGEN SATURATION: 94 % | DIASTOLIC BLOOD PRESSURE: 70 MMHG | BODY MASS INDEX: 27.94 KG/M2 | TEMPERATURE: 97.1 F | SYSTOLIC BLOOD PRESSURE: 130 MMHG | WEIGHT: 178 LBS

## 2017-10-24 DIAGNOSIS — J40 BRONCHITIS: ICD-10-CM

## 2017-10-24 DIAGNOSIS — R05.9 COUGH: ICD-10-CM

## 2017-10-24 DIAGNOSIS — J44.1 COPD EXACERBATION (HCC): ICD-10-CM

## 2017-10-24 PROCEDURE — 99214 OFFICE O/P EST MOD 30 MIN: CPT | Performed by: NURSE PRACTITIONER

## 2017-10-24 NOTE — PROGRESS NOTES
Subjective:     Kerri Newell is a 82 y.o. female who presents with No chief complaint on file.  .    HPI:   Seen in fu after tx for URI and COPD exacerbation.  She was seen last week for sx.  They had been going on for several weeks w/o improvement.  o2 say low.  Coughing up purulent secretions.  + wheezing and SOB.  She was treated with antibx and medrol.  She reports today sx are much improved.  o2 sat is wnl.  Wearing o2 only at nite.  Still has cough but it is much improved.  No fevers, chills or sweating.  No sinus pressure, eare pressure or headache    Patient Active Problem List    Diagnosis Date Noted   • Esophageal stricture    • Diverticulosis    • Osteopenia    • Hiatal hernia    • Arthritis    • Glaucoma    • Essential hypertension, benign 09/04/2012   • COPD (chronic obstructive pulmonary disease) (CMS-HCC) 04/05/2012   • DM (diabetes mellitus) (CMS-HCC) 07/13/2010   • Hyperlipidemia 07/13/2010   • Preventative health care 07/24/2009   • GERD (gastroesophageal reflux disease) 07/24/2009       Current medicines (including changes today)  Current Outpatient Prescriptions   Medication Sig Dispense Refill   • predniSONE (DELTASONE) 20 MG Tab Prednisone 20 mg 2 tabs daily x 3 days, 1 tab daily x 3 days 1/2 tab x 3 days. 12 Tab 0   • doxycycline (VIBRAMYCIN) 100 MG Tab Take 1 Tab by mouth 2 times a day. 20 Tab 0   • Cholecalciferol (VITAMIN D3) 2000 UNIT Cap Take  by mouth.     • doxycycline (VIBRAMYCIN) 100 MG Tab Take 1 Tab by mouth 2 times a day. 20 Tab 0   • JANUVIA 100 MG Tab TAKE 1 TABLET BY MOUTH ONCE DAILY 90 Tab 0   • metformin (GLUCOPHAGE) 500 MG Tab Take 1 Tab by mouth every day. 90 Tab 1   • losartan-hydrochlorothiazide (HYZAAR) 50-12.5 MG per tablet Take 1 Tab by mouth every day. 90 Tab 1   • felodipine (PLENDIL) 2.5 MG TABLET SR 24 HR Take 1 Tab by mouth every day. 90 Tab 3   • felodipine (PLENDIL) 10 MG TABLET SR 24 HR Take 1 Tab by mouth every day. TAKE 1 TAB BY MOUTH EVERY DAY.  "90 Tab 3   • rabeprazole (ACIPHEX) 20 MG tablet Take 1 Tab by mouth every day. Take on empty stomach in AM 90 Tab 3   • fluticasone (FLONASE) 50 MCG/ACT nasal spray Spray 2 Sprays in nose every day. 3 Bottle 1   • Indacaterol Maleate (ARCAPTA NEOHALER) 75 MCG Cap Inhale 1 Capsule by mouth every day. 90 Cap 3   • rosuvastatin (CRESTOR) 10 MG Tab Take 1 Tab by mouth every 48 hours. 12 Tab 3   • albuterol (PROAIR HFA) 108 (90 BASE) MCG/ACT Aero Soln inhalation aerosol Inhale 2 Puffs by mouth every 6 hours as needed for Shortness of Breath. 3 Inhaler 1   • acetaminophen (TYLENOL) 325 MG Tab Take 650 mg by mouth every four hours as needed.     • Cyanocobalamin (B-12) 2500 MCG Tab Take  by mouth.     • CALTRATE 600+D PO Take 1 Tab by mouth every day.       No current facility-administered medications for this visit.        Allergies   Allergen Reactions   • Sulfa Drugs        ROS  Constitutional: Negative. Negative for fever, chills, weight loss, malaise/fatigue and diaphoresis.   HENT: Negative. Negative for hearing loss, ear pain, nosebleeds, sore throat, neck pain, tinnitus and ear discharge.   Respiratory: Negative. Negative for hemoptysis,stridor.   Cardiovascular: Negative. Negative for chest pain, palpitations, orthopnea, claudication, leg swelling and PND.   Gastrointestinal: Denies nausea, vomiting, diarrhea, constipation, heartburn, melena or hematochezia.  Genitourinary: Denies dysuria, hematuria, urinary incontinence, frequency or urgency.        Objective:     Blood pressure 130/70, pulse 64, temperature 36.2 °C (97.1 °F), height 1.702 m (5' 7\"), weight 80.7 kg (178 lb), SpO2 94 %, not currently breastfeeding. Body mass index is 27.88 kg/m².    Physical Exam:  Vitals reviewed.  Constitutional: Oriented to person, place, and time. appears well-developed and well-nourished. No distress.   Cardiovascular: Normal rate, regular rhythm, normal heart sounds and intact distal pulses. Exam reveals no gallop and no " friction rub. No murmur heard. No carotid bruits.   Pulmonary/Chest: Effort normal and breath sounds normal. No stridor. No respiratory distress. no wheezes or rales. exhibits no tenderness.   Musculoskeletal: Normal range of motion. exhibits no edema. micky pedal pulses 2+.  Lymphadenopathy: No cervical or supraclavicular adenopathy.   Neurological: Alert and oriented to person, place, and time. exhibits normal muscle tone.  Skin: Skin is warm and dry. No diaphoresis.   Psychiatric: Normal mood and affect. Behavior is normal.      Assessment and Plan:     The following treatment plan was discussed:    1. COPD exacerbation (CMS-[facility])      sx significantly improved.  do lab and f/u 1 month for review.     2. Bronchitis     3. Cough           Followup: Return in about 4 weeks (around 11/21/2017).

## 2017-11-03 ENCOUNTER — HOSPITAL ENCOUNTER (OUTPATIENT)
Dept: LAB | Facility: MEDICAL CENTER | Age: 82
End: 2017-11-03
Attending: NURSE PRACTITIONER
Payer: MEDICARE

## 2017-11-03 ENCOUNTER — TELEPHONE (OUTPATIENT)
Dept: MEDICAL GROUP | Facility: MEDICAL CENTER | Age: 82
End: 2017-11-03

## 2017-11-03 DIAGNOSIS — E78.5 HYPERLIPIDEMIA LDL GOAL <70: ICD-10-CM

## 2017-11-03 DIAGNOSIS — M15.9 PRIMARY OSTEOARTHRITIS INVOLVING MULTIPLE JOINTS: ICD-10-CM

## 2017-11-03 LAB
ALBUMIN SERPL BCP-MCNC: 4.4 G/DL (ref 3.2–4.9)
ALBUMIN/GLOB SERPL: 1.4 G/DL
ALP SERPL-CCNC: 67 U/L (ref 30–99)
ALT SERPL-CCNC: 25 U/L (ref 2–50)
ANION GAP SERPL CALC-SCNC: 12 MMOL/L (ref 0–11.9)
AST SERPL-CCNC: 21 U/L (ref 12–45)
BILIRUB SERPL-MCNC: 0.5 MG/DL (ref 0.1–1.5)
BUN SERPL-MCNC: 20 MG/DL (ref 8–22)
CALCIUM SERPL-MCNC: 10.6 MG/DL (ref 8.5–10.5)
CHLORIDE SERPL-SCNC: 104 MMOL/L (ref 96–112)
CHOLEST SERPL-MCNC: 188 MG/DL (ref 100–199)
CK SERPL-CCNC: 79 U/L (ref 0–154)
CO2 SERPL-SCNC: 24 MMOL/L (ref 20–33)
CREAT SERPL-MCNC: 1.04 MG/DL (ref 0.5–1.4)
GFR SERPL CREATININE-BSD FRML MDRD: 51 ML/MIN/1.73 M 2
GLOBULIN SER CALC-MCNC: 3.2 G/DL (ref 1.9–3.5)
GLUCOSE SERPL-MCNC: 145 MG/DL (ref 65–99)
HDLC SERPL-MCNC: 43 MG/DL
LDLC SERPL CALC-MCNC: 81 MG/DL
POTASSIUM SERPL-SCNC: 4.1 MMOL/L (ref 3.6–5.5)
PROT SERPL-MCNC: 7.6 G/DL (ref 6–8.2)
RHEUMATOID FACT SER IA-ACNC: 119 IU/ML (ref 0–14)
SODIUM SERPL-SCNC: 140 MMOL/L (ref 135–145)
TRIGL SERPL-MCNC: 319 MG/DL (ref 0–149)

## 2017-11-03 PROCEDURE — 36415 COLL VENOUS BLD VENIPUNCTURE: CPT

## 2017-11-03 PROCEDURE — 80061 LIPID PANEL: CPT

## 2017-11-03 PROCEDURE — 86200 CCP ANTIBODY: CPT

## 2017-11-03 PROCEDURE — 82550 ASSAY OF CK (CPK): CPT

## 2017-11-03 PROCEDURE — 80053 COMPREHEN METABOLIC PANEL: CPT

## 2017-11-03 PROCEDURE — 86431 RHEUMATOID FACTOR QUANT: CPT

## 2017-11-03 NOTE — TELEPHONE ENCOUNTER
Pt called to check on status of Vitamin D billing issue from DOS in 2/28/17. I spoke with billing and Medicare is not paying for the test and she signed an ABN. Pt notified and given number to billing dept for more info.

## 2017-11-05 LAB — CCP IGG SERPL-ACNC: 5 UNITS (ref 0–19)

## 2017-11-06 ENCOUNTER — TELEPHONE (OUTPATIENT)
Dept: MEDICAL GROUP | Facility: MEDICAL CENTER | Age: 82
End: 2017-11-06

## 2017-11-06 NOTE — TELEPHONE ENCOUNTER
----- Message from German Rockwell M.D. sent at 11/4/2017  2:14 PM PDT -----  Please notify mariana's patient that the blood tests show elevated blood sugar 145, slightly elevated calcium, elevated triglycerides, decreased kidney function which is stable, and elevated rheumatoid factor.  She can discuss with mariana

## 2017-11-07 NOTE — TELEPHONE ENCOUNTER
Please ask patient what type of reaction occurred with Crestor? what other cholesterol medications has she tried?  Let her know that Adrienne is back in 2 days, I will leave this message for her to review.

## 2017-11-09 NOTE — TELEPHONE ENCOUNTER
We had discussed this at her appt. That is why i wanted her to try 2x/wk only.  What sx did she have with the crestor?  If she has also failed 2 other statins and has failed crestor then cannot try any other statin.  Will refer to dr block if that is the case.

## 2017-11-13 RX ORDER — AMOXICILLIN 500 MG/1
CAPSULE ORAL
Qty: 4 CAP | Refills: 0 | Status: SHIPPED | OUTPATIENT
Start: 2017-11-13 | End: 2017-12-18 | Stop reason: SDUPTHER

## 2017-11-14 ENCOUNTER — OFFICE VISIT (OUTPATIENT)
Dept: MEDICAL GROUP | Facility: MEDICAL CENTER | Age: 82
End: 2017-11-14
Payer: MEDICARE

## 2017-11-14 VITALS
TEMPERATURE: 97.6 F | WEIGHT: 178 LBS | BODY MASS INDEX: 27.94 KG/M2 | HEIGHT: 67 IN | DIASTOLIC BLOOD PRESSURE: 78 MMHG | OXYGEN SATURATION: 95 % | HEART RATE: 80 BPM | SYSTOLIC BLOOD PRESSURE: 142 MMHG

## 2017-11-14 DIAGNOSIS — M19.90 ARTHRITIS: ICD-10-CM

## 2017-11-14 DIAGNOSIS — M25.511 CHRONIC RIGHT SHOULDER PAIN: ICD-10-CM

## 2017-11-14 DIAGNOSIS — E78.5 HYPERLIPIDEMIA LDL GOAL <100: ICD-10-CM

## 2017-11-14 DIAGNOSIS — J44.1 COPD EXACERBATION (HCC): ICD-10-CM

## 2017-11-14 DIAGNOSIS — G89.29 CHRONIC RIGHT SHOULDER PAIN: ICD-10-CM

## 2017-11-14 DIAGNOSIS — E83.52 HYPERCALCEMIA: ICD-10-CM

## 2017-11-14 DIAGNOSIS — E11.51 DM (DIABETES MELLITUS) TYPE II CONTROLLED PERIPHERAL VASCULAR DISORDER: ICD-10-CM

## 2017-11-14 LAB
HBA1C MFR BLD: 6.8 % (ref ?–5.8)
INT CON NEG: NEGATIVE
INT CON POS: POSITIVE

## 2017-11-14 PROCEDURE — 83036 HEMOGLOBIN GLYCOSYLATED A1C: CPT | Performed by: NURSE PRACTITIONER

## 2017-11-14 PROCEDURE — 99214 OFFICE O/P EST MOD 30 MIN: CPT | Performed by: NURSE PRACTITIONER

## 2017-11-14 RX ORDER — METHYLPREDNISOLONE 4 MG/1
TABLET ORAL
Qty: 21 TAB | Refills: 0 | Status: SHIPPED | OUTPATIENT
Start: 2017-11-14 | End: 2018-04-10

## 2017-11-14 RX ORDER — AZITHROMYCIN 250 MG/1
TABLET, FILM COATED ORAL
Qty: 6 TAB | Refills: 0 | Status: SHIPPED | OUTPATIENT
Start: 2017-11-14 | End: 2017-11-19

## 2017-11-14 RX ORDER — TRAMADOL HYDROCHLORIDE 50 MG/1
50 TABLET ORAL
Qty: 30 TAB | Refills: 0 | Status: SHIPPED | OUTPATIENT
Start: 2017-11-14 | End: 2018-09-17

## 2017-11-14 NOTE — PROGRESS NOTES
Subjective:     Kerri Newell is a 82 y.o. female who presents with   Chief Complaint   Patient presents with   • Follow-Up     review labs    .    HPI:     Seen in f/u for rt shoulder pain.  It is chronic but now pain is worse.  She has limited ROM.  Pain is severe.  Using tyl for pain.  She is intoleraant of norco.  Will also consider PT.   She had to dc her crestor d/t severe pain in legs.  Now her pain is improved. Failed lipitor, crestor and pravastatin.    Reviewed lab with pt. Her CMP shows elevated glucose.  GFR is dec at 51 but improved from 48 last time.  LP shows trg are up from 216 to 319.  She has been eating bread.  Her HDL is sl low but up from 41 to 43.  LDL is good at 81 but up from 54.  This was done when she was on med.  She is no off the med.    RA is elevated at 119.  CCP, CPK is wnl.    CMP is wnl except glucose is high at 145.  a1c in office is 6.8.  Taking meds approp.  Not on healthy diet.  Ca++ is also mildly elevated.  Taking 2 ca++ tabs daily.    a1c is 6.8 in office today.  That is up from last time  She has a chronic cough.  Now her secretions are yellow. This has been going on for 3 months.  She has been to  in sept.  Her cxr was wnl.  Her pulm retired.  She needs new pulm.  She has been on 2 rounds of antibiotics and steroids.  It hasn't resolved but it better.  Will try antoher round of meds.  No fever, chills or sweating.  Having SOB.  She has seen cardiac and her heart is much improved.        Patient Active Problem List    Diagnosis Date Noted   • Esophageal stricture    • Diverticulosis    • Osteopenia of multiple sites    • Hiatal hernia    • Arthritis    • Glaucoma    • Essential hypertension, benign 09/04/2012   • COPD (chronic obstructive pulmonary disease) (CMS-[facility]) 04/05/2012   • DM (diabetes mellitus) (CMS-HCC) 07/13/2010   • Hyperlipidemia 07/13/2010   • Preventative health care 07/24/2009   • GERD (gastroesophageal reflux disease) 07/24/2009       Current  medicines (including changes today)  Current Outpatient Prescriptions   Medication Sig Dispense Refill   • tramadol (ULTRAM) 50 MG Tab Take 1 Tab by mouth every 24 hours as needed. 30 Tab 0   • azithromycin (ZITHROMAX) 250 MG Tab 2 tabs by mouth day 1, 1 tab by mouth days 2-5 6 Tab 0   • MethylPREDNISolone (MEDROL DOSEPAK) 4 MG Tablet Therapy Pack As directed on the packaging label. 21 Tab 0   • Cholecalciferol (VITAMIN D3) 2000 UNIT Cap Take  by mouth.     • JANUVIA 100 MG Tab TAKE 1 TABLET BY MOUTH ONCE DAILY 90 Tab 0   • metformin (GLUCOPHAGE) 500 MG Tab Take 1 Tab by mouth every day. 90 Tab 1   • losartan-hydrochlorothiazide (HYZAAR) 50-12.5 MG per tablet Take 1 Tab by mouth every day. 90 Tab 1   • felodipine (PLENDIL) 2.5 MG TABLET SR 24 HR Take 1 Tab by mouth every day. 90 Tab 3   • felodipine (PLENDIL) 10 MG TABLET SR 24 HR Take 1 Tab by mouth every day. TAKE 1 TAB BY MOUTH EVERY DAY. 90 Tab 3   • rabeprazole (ACIPHEX) 20 MG tablet Take 1 Tab by mouth every day. Take on empty stomach in AM 90 Tab 3   • fluticasone (FLONASE) 50 MCG/ACT nasal spray Spray 2 Sprays in nose every day. 3 Bottle 1   • albuterol (PROAIR HFA) 108 (90 BASE) MCG/ACT Aero Soln inhalation aerosol Inhale 2 Puffs by mouth every 6 hours as needed for Shortness of Breath. 3 Inhaler 1   • acetaminophen (TYLENOL) 325 MG Tab Take 650 mg by mouth every four hours as needed.     • Cyanocobalamin (B-12) 2500 MCG Tab Take  by mouth.     • CALTRATE 600+D PO Take 1 Tab by mouth every day.     • amoxicillin (AMOXIL) 500 MG Cap TAKE 4 CAPSULES BY MOUTH ONCE FOR 1 DOSE 4 Cap 0   • predniSONE (DELTASONE) 20 MG Tab Prednisone 20 mg 2 tabs daily x 3 days, 1 tab daily x 3 days 1/2 tab x 3 days. 12 Tab 0   • doxycycline (VIBRAMYCIN) 100 MG Tab Take 1 Tab by mouth 2 times a day. 20 Tab 0   • doxycycline (VIBRAMYCIN) 100 MG Tab Take 1 Tab by mouth 2 times a day. 20 Tab 0   • Indacaterol Maleate (ARCAPTA NEOHALER) 75 MCG Cap Inhale 1 Capsule by mouth every day.  "90 Cap 3   • rosuvastatin (CRESTOR) 10 MG Tab Take 1 Tab by mouth every 48 hours. 12 Tab 3     No current facility-administered medications for this visit.        Allergies   Allergen Reactions   • Sulfa Drugs        ROS  Constitutional: Negative. Negative for fever, chills, weight loss, malaise/fatigue and diaphoresis.   HENT: Negative. Negative for hearing loss, ear pain, nosebleeds,  sore throat, neck pain, tinnitus and ear discharge.   Respiratory: Negative. Negative for hemoptysis, wheezing and stridor.   Cardiovascular: Negative. Negative for chest pain, palpitations, orthopnea, claudication, leg swelling and PND.   Gastrointestinal: Denies nausea, vomiting, diarrhea, constipation, heartburn, melena or hematochezia.  Genitourinary: Denies dysuria, hematuria, urinary incontinence, frequency or urgency.        Objective:     Blood pressure 142/78, pulse 80, temperature 36.4 °C (97.6 °F), height 1.702 m (5' 7\"), weight 80.7 kg (178 lb), SpO2 95 %. Body mass index is 27.88 kg/m².    Physical Exam:  Vitals reviewed.  Constitutional: Oriented to person, place, and time. appears well-developed and well-nourished. No distress.  micky tm wnl.    Cardiovascular: Normal rate, regular rhythm, normal heart sounds and intact distal pulses. Exam reveals no gallop and no friction rub. No murmur heard. No carotid bruits.   Pulmonary/Chest: Effort normal and breath sounds normal. No stridor. No respiratory distress. no wheezes or rales. exhibits no tenderness.   Musculoskeletal: Normal range of motion. exhibits no edema. micky pedal pulses 2+.  Lymphadenopathy: No cervical or supraclavicular adenopathy.   Neurological: Alert and oriented to person, place, and time. exhibits normal muscle tone.  Skin: Skin is warm and dry. No diaphoresis.   Psychiatric: Normal mood and affect. Behavior is normal.      Assessment and Plan:     The following treatment plan was discussed:    1. Hypercalcemia      dec calcium in diet. has been taking 2 " tabs.  will dec to one.    2. Hyperlipidemia LDL goal <100  LIPID PROFILE    improve healthy diet and exercise.  recheck LP in 3 months.  f/u for lab review.     3. DM (diabetes mellitus) type II controlled peripheral vascular disorder (CMS-HCC)  Diabetic Monofilament LE Exam    HEMOGLOBIN A1C    POCT Hemoglobin A1C    a1c is up from last time.  improve diet and exercise.  recheck lab in 3 months.  f/u for lab review   4. Chronic right shoulder pain  REFERRAL TO PHYSICAL THERAPY Reason for Therapy: Eval/Treat/Report    tramadol (ULTRAM) 50 MG Tab    try PT.  she is also being referred to rhuematology for + RA factor.  use small amt ultram for pain.     5. COPD exacerbation ([facility])  REFERRAL TO PULMONOLOGY    azithromycin (ZITHROMAX) 250 MG Tab    MethylPREDNISolone (MEDROL DOSEPAK) 4 MG Tablet Therapy Pack    repeat medrol dose bull.  try zpak.  refer to new pulm d/t last one retiring.  f/u if sx not imrpoved wiht tx.     6. Arthritis  REFERRAL TO PHYSICAL THERAPY Reason for Therapy: Eval/Treat/Report    REFERRAL TO RHEUMATOLOGY    tramadol (ULTRAM) 50 MG Tab    DX-HAND 3+ LEFT    DX-HAND 3+ RIGHT    refer rheumatology for + RA factor but neg CCP.  do micky hand xrays.           Followup: Return in about 3 months (around 2/14/2018).

## 2017-11-17 ENCOUNTER — HOSPITAL ENCOUNTER (OUTPATIENT)
Dept: RADIOLOGY | Facility: MEDICAL CENTER | Age: 82
End: 2017-11-17
Attending: NURSE PRACTITIONER
Payer: MEDICARE

## 2017-11-17 DIAGNOSIS — M19.90 ARTHRITIS: ICD-10-CM

## 2017-11-17 PROCEDURE — 77077 JOINT SURVEY SINGLE VIEW: CPT

## 2017-11-18 ENCOUNTER — TELEPHONE (OUTPATIENT)
Dept: MEDICAL GROUP | Facility: MEDICAL CENTER | Age: 82
End: 2017-11-18

## 2017-11-21 NOTE — TELEPHONE ENCOUNTER
Pt called back and states unable to get an apt until April, asking if she can be referred somewhere else to be seen sooner. Pt is having a lot of pain in her back and shoulder.

## 2017-11-21 NOTE — TELEPHONE ENCOUNTER
i doubt there is another rheumatologist that she can get into sooner here in [place].  She can check in [place].  Let me know if she finds one and we can refer. S he can also f/u with me for pain meds.

## 2017-12-13 ENCOUNTER — TELEPHONE (OUTPATIENT)
Dept: MEDICAL GROUP | Facility: MEDICAL CENTER | Age: 82
End: 2017-12-13

## 2017-12-13 ENCOUNTER — OFFICE VISIT (OUTPATIENT)
Dept: MEDICAL GROUP | Facility: MEDICAL CENTER | Age: 82
End: 2017-12-13
Payer: MEDICARE

## 2017-12-13 ENCOUNTER — HOSPITAL ENCOUNTER (OUTPATIENT)
Dept: RADIOLOGY | Facility: MEDICAL CENTER | Age: 82
End: 2017-12-13
Attending: NURSE PRACTITIONER
Payer: MEDICARE

## 2017-12-13 VITALS
WEIGHT: 180 LBS | HEART RATE: 71 BPM | BODY MASS INDEX: 28.25 KG/M2 | HEIGHT: 67 IN | TEMPERATURE: 97 F | DIASTOLIC BLOOD PRESSURE: 64 MMHG | OXYGEN SATURATION: 92 % | SYSTOLIC BLOOD PRESSURE: 118 MMHG

## 2017-12-13 DIAGNOSIS — M25.511 CHRONIC RIGHT SHOULDER PAIN: ICD-10-CM

## 2017-12-13 DIAGNOSIS — G89.29 CHRONIC RIGHT SHOULDER PAIN: ICD-10-CM

## 2017-12-13 PROCEDURE — 73030 X-RAY EXAM OF SHOULDER: CPT | Mod: RT

## 2017-12-13 PROCEDURE — 99214 OFFICE O/P EST MOD 30 MIN: CPT | Performed by: NURSE PRACTITIONER

## 2017-12-13 NOTE — PROGRESS NOTES
Subjective:     Kerri Newell is a 82 y.o. female who presents with   Chief Complaint   Patient presents with   • Shoulder Pain     Right x2 months    .    HPI:     Seen in f/u for right shoulder pain.  She is going to do PT but not til lucretia.  Causing her lots of pain.  Cannot sleep at nite.  She was given ultram last visit.  It didnt help.  Doesn't want norco.  She is using tyl for the pain.  She is also using heating pad.  That is also helping.  Has pain with any ROM.  She will use some of her husbands norco if needed.      Patient Active Problem List    Diagnosis Date Noted   • Esophageal stricture    • Diverticulosis    • Osteopenia of multiple sites    • Hiatal hernia    • Arthritis    • Glaucoma    • Essential hypertension, benign 09/04/2012   • COPD (chronic obstructive pulmonary disease) (CMS-HCC) 04/05/2012   • DM (diabetes mellitus) (CMS-HCC) 07/13/2010   • Hyperlipidemia 07/13/2010   • Preventative health care 07/24/2009   • GERD (gastroesophageal reflux disease) 07/24/2009       Current medicines (including changes today)  Current Outpatient Prescriptions   Medication Sig Dispense Refill   • Cholecalciferol (VITAMIN D3) 2000 UNIT Cap Take  by mouth.     • JANUVIA 100 MG Tab TAKE 1 TABLET BY MOUTH ONCE DAILY 90 Tab 0   • metformin (GLUCOPHAGE) 500 MG Tab Take 1 Tab by mouth every day. 90 Tab 1   • losartan-hydrochlorothiazide (HYZAAR) 50-12.5 MG per tablet Take 1 Tab by mouth every day. 90 Tab 1   • felodipine (PLENDIL) 2.5 MG TABLET SR 24 HR Take 1 Tab by mouth every day. 90 Tab 3   • felodipine (PLENDIL) 10 MG TABLET SR 24 HR Take 1 Tab by mouth every day. TAKE 1 TAB BY MOUTH EVERY DAY. 90 Tab 3   • rabeprazole (ACIPHEX) 20 MG tablet Take 1 Tab by mouth every day. Take on empty stomach in AM 90 Tab 3   • fluticasone (FLONASE) 50 MCG/ACT nasal spray Spray 2 Sprays in nose every day. 3 Bottle 1   • Indacaterol Maleate (ARCAPTA NEOHALER) 75 MCG Cap Inhale 1 Capsule by mouth every day. 90 Cap 3  "  • albuterol (PROAIR HFA) 108 (90 BASE) MCG/ACT Aero Soln inhalation aerosol Inhale 2 Puffs by mouth every 6 hours as needed for Shortness of Breath. 3 Inhaler 1   • acetaminophen (TYLENOL) 325 MG Tab Take 650 mg by mouth every four hours as needed.     • Cyanocobalamin (B-12) 2500 MCG Tab Take  by mouth.     • CALTRATE 600+D PO Take 1 Tab by mouth every day.     • tramadol (ULTRAM) 50 MG Tab Take 1 Tab by mouth every 24 hours as needed. 30 Tab 0   • MethylPREDNISolone (MEDROL DOSEPAK) 4 MG Tablet Therapy Pack As directed on the packaging label. 21 Tab 0   • amoxicillin (AMOXIL) 500 MG Cap TAKE 4 CAPSULES BY MOUTH ONCE FOR 1 DOSE 4 Cap 0   • predniSONE (DELTASONE) 20 MG Tab Prednisone 20 mg 2 tabs daily x 3 days, 1 tab daily x 3 days 1/2 tab x 3 days. 12 Tab 0   • doxycycline (VIBRAMYCIN) 100 MG Tab Take 1 Tab by mouth 2 times a day. 20 Tab 0   • doxycycline (VIBRAMYCIN) 100 MG Tab Take 1 Tab by mouth 2 times a day. 20 Tab 0   • rosuvastatin (CRESTOR) 10 MG Tab Take 1 Tab by mouth every 48 hours. 12 Tab 3     No current facility-administered medications for this visit.        Allergies   Allergen Reactions   • Sulfa Drugs        ROS  Constitutional: Negative. Negative for fever, chills, weight loss, malaise/fatigue and diaphoresis.   HENT: Negative. Negative for hearing loss, ear pain, nosebleeds, congestion, sore throat, neck pain, tinnitus and ear discharge.   Respiratory: Negative. Negative for cough, hemoptysis, sputum production, shortness of breath, wheezing and stridor.   Cardiovascular: Negative. Negative for chest pain, palpitations, orthopnea, claudication, leg swelling and PND.   Gastrointestinal: Denies nausea, vomiting, diarrhea, constipation, heartburn, melena or hematochezia.  Genitourinary: Denies dysuria, hematuria, urinary incontinence, frequency or urgency.        Objective:     Blood pressure 118/64, pulse 71, temperature 36.1 °C (97 °F), height 1.702 m (5' 7\"), weight 81.6 kg (180 lb), SpO2 92 " %. Body mass index is 28.19 kg/m².    Physical Exam:  Vitals reviewed.  Constitutional: Oriented to person, place, and time. appears well-developed and well-nourished. No distress.   Cardiovascular: Normal rate, regular rhythm, normal heart sounds and intact distal pulses. Exam reveals no gallop and no friction rub. No murmur heard. No carotid bruits.   Pulmonary/Chest: Effort normal and breath sounds normal. No stridor. No respiratory distress. no wheezes or rales. exhibits no tenderness.   Musculoskeletal: pain with rt shoulder range of motion. exhibits no edema. micky pedal pulses 2+.  Neurological: Alert and oriented to person, place, and time. exhibits normal muscle tone.  Skin: Skin is warm and dry. No diaphoresis.   Psychiatric: Normal mood and affect. Behavior is normal.      Assessment and Plan:     The following treatment plan was discussed:    1. Chronic right shoulder pain  DX-SHOULDER 2+ RIGHT    xray rt shuolder.  f/u with pt with results.  then f/u as sched 3 months.  consider ortho referral or MRI shoulder.          Followup: Return in about 3 months (around 3/13/2018).

## 2017-12-16 ENCOUNTER — HOSPITAL ENCOUNTER (OUTPATIENT)
Dept: RADIOLOGY | Facility: MEDICAL CENTER | Age: 82
End: 2017-12-16
Attending: NURSE PRACTITIONER
Payer: MEDICARE

## 2017-12-16 DIAGNOSIS — G89.29 CHRONIC RIGHT SHOULDER PAIN: ICD-10-CM

## 2017-12-16 DIAGNOSIS — M25.511 CHRONIC RIGHT SHOULDER PAIN: ICD-10-CM

## 2017-12-16 PROCEDURE — 73221 MRI JOINT UPR EXTREM W/O DYE: CPT | Mod: RT

## 2017-12-18 ENCOUNTER — TELEPHONE (OUTPATIENT)
Dept: MEDICAL GROUP | Facility: MEDICAL CENTER | Age: 82
End: 2017-12-18

## 2017-12-18 DIAGNOSIS — G89.29 CHRONIC RIGHT SHOULDER PAIN: ICD-10-CM

## 2017-12-18 DIAGNOSIS — M25.511 CHRONIC RIGHT SHOULDER PAIN: ICD-10-CM

## 2017-12-18 DIAGNOSIS — M75.101 TEAR OF RIGHT ROTATOR CUFF, UNSPECIFIED TEAR EXTENT: ICD-10-CM

## 2017-12-19 RX ORDER — AMOXICILLIN 500 MG/1
CAPSULE ORAL
Qty: 4 CAP | Refills: 0 | Status: SHIPPED | OUTPATIENT
Start: 2017-12-19 | End: 2018-04-10

## 2017-12-19 NOTE — TELEPHONE ENCOUNTER
Spoke to Pt, she would like to see Ortho and is requesting Dr. Maia Real at [facility] ([facility] [place] if her insurance will cover for her to see her.

## 2017-12-19 NOTE — TELEPHONE ENCOUNTER
Please let pt know that the MRI shoulder shows multiple tears in her rotator cuff with swelling and some OA.  Needs to see ortho.  i will refer if she is ok with that.

## 2018-01-15 ENCOUNTER — TELEPHONE (OUTPATIENT)
Dept: MEDICAL GROUP | Facility: MEDICAL CENTER | Age: 83
End: 2018-01-15

## 2018-01-15 DIAGNOSIS — N95.0 POST-MENOPAUSAL BLEEDING: ICD-10-CM

## 2018-01-16 NOTE — TELEPHONE ENCOUNTER
Left message for patient to call back at [phone].  Please inform pt I re faxed with another new code

## 2018-01-16 NOTE — TELEPHONE ENCOUNTER
Pt called stated the code review still didn't cover the Vit D test. Pt states billing needs you to justify why the test what ordered.

## 2018-01-23 ENCOUNTER — TELEPHONE (OUTPATIENT)
Dept: MEDICAL GROUP | Facility: MEDICAL CENTER | Age: 83
End: 2018-01-23

## 2018-01-23 RX ORDER — BENZONATATE 100 MG/1
100 CAPSULE ORAL 3 TIMES DAILY PRN
Qty: 60 CAP | Refills: 0 | Status: SHIPPED | OUTPATIENT
Start: 2018-01-23 | End: 2018-04-10

## 2018-01-23 NOTE — TELEPHONE ENCOUNTER
1. Caller Name: Kerri Newell                                         Call Back Number: [phone]      Patient approves a detailed voicemail message: yes      Pt is calling to request a Rx for Tessalon. Pt had a prescription back in March and is just getting over the Flu. Pt states she used a previous Tessalon Rx and is now out, requesting a refill.

## 2018-03-30 ENCOUNTER — HOSPITAL ENCOUNTER (OUTPATIENT)
Dept: LAB | Facility: MEDICAL CENTER | Age: 83
End: 2018-03-30
Attending: NURSE PRACTITIONER
Payer: MEDICARE

## 2018-03-30 DIAGNOSIS — E78.5 HYPERLIPIDEMIA LDL GOAL <100: ICD-10-CM

## 2018-03-30 DIAGNOSIS — E11.51 DM (DIABETES MELLITUS) TYPE II CONTROLLED PERIPHERAL VASCULAR DISORDER: ICD-10-CM

## 2018-03-30 LAB
CHOLEST SERPL-MCNC: 192 MG/DL (ref 100–199)
EST. AVERAGE GLUCOSE BLD GHB EST-MCNC: 146 MG/DL
HBA1C MFR BLD: 6.7 % (ref 0–5.6)
HDLC SERPL-MCNC: 44 MG/DL
LDLC SERPL CALC-MCNC: 109 MG/DL
TRIGL SERPL-MCNC: 193 MG/DL (ref 0–149)

## 2018-03-30 PROCEDURE — 80061 LIPID PANEL: CPT

## 2018-03-30 PROCEDURE — 83036 HEMOGLOBIN GLYCOSYLATED A1C: CPT | Mod: GA

## 2018-03-30 PROCEDURE — 36415 COLL VENOUS BLD VENIPUNCTURE: CPT

## 2018-04-04 ENCOUNTER — OFFICE VISIT (OUTPATIENT)
Dept: RHEUMATOLOGY | Facility: PHYSICIAN GROUP | Age: 83
End: 2018-04-04
Payer: MEDICARE

## 2018-04-04 VITALS
TEMPERATURE: 97.9 F | SYSTOLIC BLOOD PRESSURE: 110 MMHG | OXYGEN SATURATION: 92 % | DIASTOLIC BLOOD PRESSURE: 80 MMHG | BODY MASS INDEX: 28.35 KG/M2 | HEIGHT: 67 IN | HEART RATE: 76 BPM | RESPIRATION RATE: 12 BRPM | WEIGHT: 180.6 LBS

## 2018-04-04 DIAGNOSIS — M25.50 ARTHRALGIA, UNSPECIFIED JOINT: ICD-10-CM

## 2018-04-04 DIAGNOSIS — R76.8 RHEUMATOID FACTOR POSITIVE: ICD-10-CM

## 2018-04-04 DIAGNOSIS — M85.89 OSTEOPENIA OF MULTIPLE SITES: ICD-10-CM

## 2018-04-04 PROCEDURE — 99205 OFFICE O/P NEW HI 60 MIN: CPT | Performed by: INTERNAL MEDICINE
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
## 2018-04-10 ENCOUNTER — OFFICE VISIT (OUTPATIENT)
Dept: MEDICAL GROUP | Facility: MEDICAL CENTER | Age: 83
End: 2018-04-10
Payer: MEDICARE

## 2018-04-10 VITALS
HEART RATE: 78 BPM | DIASTOLIC BLOOD PRESSURE: 68 MMHG | RESPIRATION RATE: 12 BRPM | OXYGEN SATURATION: 94 % | TEMPERATURE: 97.5 F | HEIGHT: 67 IN | SYSTOLIC BLOOD PRESSURE: 116 MMHG

## 2018-04-10 DIAGNOSIS — Z12.11 SCREEN FOR COLON CANCER: ICD-10-CM

## 2018-04-10 DIAGNOSIS — E55.9 VITAMIN D DEFICIENCY: ICD-10-CM

## 2018-04-10 DIAGNOSIS — E78.5 HYPERLIPIDEMIA LDL GOAL <100: ICD-10-CM

## 2018-04-10 DIAGNOSIS — E11.51 DM (DIABETES MELLITUS) TYPE II CONTROLLED PERIPHERAL VASCULAR DISORDER: ICD-10-CM

## 2018-04-10 DIAGNOSIS — M75.121 COMPLETE TEAR OF RIGHT ROTATOR CUFF: ICD-10-CM

## 2018-04-10 DIAGNOSIS — I10 ESSENTIAL HYPERTENSION, BENIGN: ICD-10-CM

## 2018-04-10 DIAGNOSIS — J43.8 OTHER EMPHYSEMA (HCC): ICD-10-CM

## 2018-04-10 DIAGNOSIS — R30.0 DYSURIA: ICD-10-CM

## 2018-04-10 LAB
APPEARANCE UR: NORMAL
BILIRUB UR STRIP-MCNC: NORMAL MG/DL
COLOR UR AUTO: NORMAL
GLUCOSE UR STRIP.AUTO-MCNC: NORMAL MG/DL
KETONES UR STRIP.AUTO-MCNC: NORMAL MG/DL
LEUKOCYTE ESTERASE UR QL STRIP.AUTO: NORMAL
NITRITE UR QL STRIP.AUTO: NORMAL
PH UR STRIP.AUTO: 5 [PH] (ref 5–8)
PROT UR QL STRIP: NORMAL MG/DL
RBC UR QL AUTO: NORMAL
SP GR UR STRIP.AUTO: 1.01
UROBILINOGEN UR STRIP-MCNC: NORMAL MG/DL

## 2018-04-10 PROCEDURE — 99214 OFFICE O/P EST MOD 30 MIN: CPT | Performed by: NURSE PRACTITIONER

## 2018-04-10 PROCEDURE — 81002 URINALYSIS NONAUTO W/O SCOPE: CPT | Performed by: NURSE PRACTITIONER

## 2018-04-10 RX ORDER — FLUTICASONE PROPIONATE 50 MCG
2 SPRAY, SUSPENSION (ML) NASAL DAILY
Qty: 3 BOTTLE | Refills: 1 | Status: SHIPPED | OUTPATIENT
Start: 2018-04-10 | End: 2018-09-17 | Stop reason: SDUPTHER

## 2018-04-10 RX ORDER — CIPROFLOXACIN 500 MG/1
500 TABLET, FILM COATED ORAL 2 TIMES DAILY
Qty: 20 TAB | Refills: 0 | Status: SHIPPED | OUTPATIENT
Start: 2018-04-10 | End: 2018-09-17

## 2018-04-10 RX ORDER — LOSARTAN POTASSIUM AND HYDROCHLOROTHIAZIDE 12.5; 5 MG/1; MG/1
1 TABLET ORAL
Qty: 90 TAB | Refills: 1 | Status: SHIPPED | OUTPATIENT
Start: 2018-04-10 | End: 2018-09-17 | Stop reason: SDUPTHER

## 2018-04-10 ASSESSMENT — ACTIVITIES OF DAILY LIVING (ADL): BATHING_REQUIRES_ASSISTANCE: 0

## 2018-04-10 ASSESSMENT — PATIENT HEALTH QUESTIONNAIRE - PHQ9: CLINICAL INTERPRETATION OF PHQ2 SCORE: 0

## 2018-04-10 NOTE — PROGRESS NOTES
Subjective:     Kerri Newell is a 82 y.o. female who presents with DM.    HPI:   Seen in f/u for DM.  SHE HAS Been trying to loose wt but  Not very active.  She does her house work still.    She needs refills on her meds.  She is due FIT test.  Reviewed lab with pt.  Her a1c is down from 6.8 to 6.7.  LP shows trg are much improved.  Down from 319 to 193.    Hdl IS DOWn sl at 44.    She had to dc crestor d/t muscle aching in dec.  She had been on high chol med for long term.  Has failed pravastatin, crestor and lipitor.  She saw her cardiac.  He told her to have me start repatha if LDL is high.  Her last LDL was 81.  Now up to 109.  Goal is <100.    Her last vitamin d was low 2/17.    She saw dr carrillo.  Lab is ordered.  She told pt that pt has both RA and OA.  Will f/u with her after lab.   She went to see shoulder dr.  Her rt shoulder has complete tear.  She will need surgery for complete repair.  She is high risk.  They did injection that didn't help.  [facility] is also doing PT.  That is to strengthening her arm to support her surgery.  They are going to do arm strengthening and cannot do anything for the shoulder.  For last 3 days she is waking up with dysuria.  She is drinking more water and using cranberry tabs.       Patient Active Problem List    Diagnosis Date Noted   • Esophageal stricture    • Diverticulosis    • Osteopenia of multiple sites    • Hiatal hernia    • Arthritis    • Glaucoma    • Essential hypertension, benign 09/04/2012   • COPD (chronic obstructive pulmonary disease) (CMS-HCC) 04/05/2012   • DM (diabetes mellitus) (CMS-HCC) 07/13/2010   • Hyperlipidemia 07/13/2010   • Preventative health care 07/24/2009   • GERD (gastroesophageal reflux disease) 07/24/2009       Current medicines (including changes today)  Current Outpatient Prescriptions   Medication Sig Dispense Refill   • metFORMIN (GLUCOPHAGE) 500 MG Tab Take 1 Tab by mouth every day. 90 Tab 1   • losartan-hydrochlorothiazide (HYZAAR)  50-12.5 MG per tablet Take 1 Tab by mouth every day. 90 Tab 1   • fluticasone (FLONASE) 50 MCG/ACT nasal spray Spray 2 Sprays in nose every day. 3 Bottle 1   • ciprofloxacin (CIPRO) 500 MG Tab Take 1 Tab by mouth 2 times a day. 20 Tab 0   • Diclofenac Sodium 1 % Gel Apply 1 Application to skin as directed 2 Times a Day. 100 g 0   • tramadol (ULTRAM) 50 MG Tab Take 1 Tab by mouth every 24 hours as needed. 30 Tab 0   • Cholecalciferol (VITAMIN D3) 2000 UNIT Cap Take  by mouth.     • JANUVIA 100 MG Tab TAKE 1 TABLET BY MOUTH ONCE DAILY 90 Tab 0   • felodipine (PLENDIL) 2.5 MG TABLET SR 24 HR Take 1 Tab by mouth every day. 90 Tab 3   • felodipine (PLENDIL) 10 MG TABLET SR 24 HR Take 1 Tab by mouth every day. TAKE 1 TAB BY MOUTH EVERY DAY. 90 Tab 3   • rabeprazole (ACIPHEX) 20 MG tablet Take 1 Tab by mouth every day. Take on empty stomach in AM 90 Tab 3   • Indacaterol Maleate (ARCAPTA NEOHALER) 75 MCG Cap Inhale 1 Capsule by mouth every day. 90 Cap 3   • albuterol (PROAIR HFA) 108 (90 BASE) MCG/ACT Aero Soln inhalation aerosol Inhale 2 Puffs by mouth every 6 hours as needed for Shortness of Breath. 3 Inhaler 1   • acetaminophen (TYLENOL) 325 MG Tab Take 650 mg by mouth every four hours as needed.     • Cyanocobalamin (B-12) 2500 MCG Tab Take  by mouth.     • CALTRATE 600+D PO Take 1 Tab by mouth every day.       No current facility-administered medications for this visit.        Allergies   Allergen Reactions   • Sulfa Drugs        ROS  Constitutional: Negative. Negative for fever, chills, weight loss, malaise/fatigue and diaphoresis.   HENT: Negative. Negative for hearing loss, ear pain, nosebleeds, congestion, sore throat, neck pain, tinnitus and ear discharge.   Respiratory: Negative. Negative for cough, hemoptysis, sputum production, shortness of breath, wheezing and stridor.   Cardiovascular: Negative. Negative for chest pain, palpitations, orthopnea, claudication, leg swelling and PND.   Gastrointestinal: Denies  "nausea, vomiting, diarrhea, constipation, heartburn, melena or hematochezia.  Genitourinary: Denies dysuria, hematuria, urinary incontinence, frequency or urgency.        Objective:     Blood pressure 116/68, pulse 78, temperature 36.4 °C (97.5 °F), resp. rate 12, height 1.702 m (5' 7\"), SpO2 94 %. There is no height or weight on file to calculate BMI.    Physical Exam:  Vitals reviewed.  Constitutional: Oriented to person, place, and time. appears well-developed and well-nourished. No distress.   Cardiovascular: Normal rate, regular rhythm, normal heart sounds and intact distal pulses. Exam reveals no gallop and no friction rub. No murmur heard. No carotid bruits.   Pulmonary/Chest: Effort normal and breath sounds normal. No stridor. No respiratory distress. no wheezes or rales. exhibits no tenderness.   Musculoskeletal: Normal range of motion. exhibits no edema. micky pedal pulses 2+.  Lymphadenopathy: No cervical or supraclavicular adenopathy.   Neurological: Alert and oriented to person, place, and time. exhibits normal muscle tone.  Skin: Skin is warm and dry. No diaphoresis.   Psychiatric: Normal mood and affect. Behavior is normal.   Abd:  Bs + x4.  No HSM or CVA tenderness.  No suprapubic tenderenss.       Assessment and Plan:     The following treatment plan was discussed:    1. DM (diabetes mellitus) type II controlled peripheral vascular disorder (CMS-HCC)  metFORMIN (GLUCOPHAGE) 500 MG Tab    COMP METABOLIC PANEL    HEMOGLOBIN A1C    MICROALBUMIN CREAT RATIO URINE    IMPROVE diet and exercise.  recheck lab in 4 months.  f/u for review   2. Hyperlipidemia LDL goal <100  COMP METABOLIC PANEL    LIPID PROFILE    recheck lab in 4 months.  f/u for review.  if LDL worse will start repatha   3. Essential hypertension, benign  losartan-hydrochlorothiazide (HYZAAR) 50-12.5 MG per tablet    COMP METABOLIC PANEL    MICROALBUMIN CREAT RATIO URINE    controlled.  refilled meds.  f/u 4 months do lab before appt   4. " Dysuria      UA in office shows jprob UTI.  cipro x 7 days   5. Other emphysema (CMS-HCC)  fluticasone (FLONASE) 50 MCG/ACT nasal spray    refilled meds.  followed by pulm   6. Complete tear of right rotator cuff      continue f/u with ortho and PT   7. Vitamin D deficiency  VITAMIN D,25 HYDROXY    recheck lab and f/u 4 months.     8. Screen for colon cancer  OCCULT BLOOD FECES IMMUNOASSAY         Followup: Return in about 4 months (around 8/10/2018).

## 2018-04-12 ENCOUNTER — HOSPITAL ENCOUNTER (OUTPATIENT)
Dept: RADIOLOGY | Facility: MEDICAL CENTER | Age: 83
End: 2018-04-12
Attending: INTERNAL MEDICINE
Payer: MEDICARE

## 2018-04-12 ENCOUNTER — HOSPITAL ENCOUNTER (OUTPATIENT)
Facility: MEDICAL CENTER | Age: 83
End: 2018-04-12
Attending: NURSE PRACTITIONER
Payer: MEDICARE

## 2018-04-12 ENCOUNTER — HOSPITAL ENCOUNTER (OUTPATIENT)
Dept: LAB | Facility: MEDICAL CENTER | Age: 83
End: 2018-04-12
Attending: INTERNAL MEDICINE
Payer: MEDICARE

## 2018-04-12 DIAGNOSIS — R76.8 RHEUMATOID FACTOR POSITIVE: ICD-10-CM

## 2018-04-12 DIAGNOSIS — M25.50 ARTHRALGIA, UNSPECIFIED JOINT: ICD-10-CM

## 2018-04-12 DIAGNOSIS — M85.89 OSTEOPENIA OF MULTIPLE SITES: ICD-10-CM

## 2018-04-12 LAB
ALBUMIN SERPL BCP-MCNC: 4.5 G/DL (ref 3.2–4.9)
ALBUMIN/GLOB SERPL: 1.2 G/DL
ALP SERPL-CCNC: 93 U/L (ref 30–99)
ALT SERPL-CCNC: 23 U/L (ref 2–50)
ANION GAP SERPL CALC-SCNC: 12 MMOL/L (ref 0–11.9)
AST SERPL-CCNC: 18 U/L (ref 12–45)
BASOPHILS # BLD AUTO: 0.3 % (ref 0–1.8)
BASOPHILS # BLD: 0.03 K/UL (ref 0–0.12)
BILIRUB SERPL-MCNC: 0.5 MG/DL (ref 0.1–1.5)
BUN SERPL-MCNC: 27 MG/DL (ref 8–22)
C3 SERPL-MCNC: 174 MG/DL (ref 87–200)
C4 SERPL-MCNC: 59 MG/DL (ref 19–52)
CALCIUM SERPL-MCNC: 10.2 MG/DL (ref 8.5–10.5)
CHLORIDE SERPL-SCNC: 104 MMOL/L (ref 96–112)
CO2 SERPL-SCNC: 26 MMOL/L (ref 20–33)
CREAT SERPL-MCNC: 1.19 MG/DL (ref 0.5–1.4)
EOSINOPHIL # BLD AUTO: 0.16 K/UL (ref 0–0.51)
EOSINOPHIL NFR BLD: 1.7 % (ref 0–6.9)
ERYTHROCYTE [DISTWIDTH] IN BLOOD BY AUTOMATED COUNT: 49.6 FL (ref 35.9–50)
GLOBULIN SER CALC-MCNC: 3.7 G/DL (ref 1.9–3.5)
GLUCOSE SERPL-MCNC: 150 MG/DL (ref 65–99)
HBV CORE AB SERPL QL IA: NEGATIVE
HBV SURFACE AG SER QL: NEGATIVE
HCT VFR BLD AUTO: 48.3 % (ref 37–47)
HCV AB SER QL: NEGATIVE
HGB BLD-MCNC: 15.1 G/DL (ref 12–16)
IMM GRANULOCYTES # BLD AUTO: 0.06 K/UL (ref 0–0.11)
IMM GRANULOCYTES NFR BLD AUTO: 0.6 % (ref 0–0.9)
LYMPHOCYTES # BLD AUTO: 2.19 K/UL (ref 1–4.8)
LYMPHOCYTES NFR BLD: 23.6 % (ref 22–41)
MCH RBC QN AUTO: 28.5 PG (ref 27–33)
MCHC RBC AUTO-ENTMCNC: 31.3 G/DL (ref 33.6–35)
MCV RBC AUTO: 91.3 FL (ref 81.4–97.8)
MONOCYTES # BLD AUTO: 0.41 K/UL (ref 0–0.85)
MONOCYTES NFR BLD AUTO: 4.4 % (ref 0–13.4)
NEUTROPHILS # BLD AUTO: 6.42 K/UL (ref 2–7.15)
NEUTROPHILS NFR BLD: 69.4 % (ref 44–72)
NRBC # BLD AUTO: 0 K/UL
NRBC BLD-RTO: 0 /100 WBC
PLATELET # BLD AUTO: 305 K/UL (ref 164–446)
PMV BLD AUTO: 10.6 FL (ref 9–12.9)
POTASSIUM SERPL-SCNC: 3.8 MMOL/L (ref 3.6–5.5)
PROT SERPL-MCNC: 8.2 G/DL (ref 6–8.2)
RBC # BLD AUTO: 5.29 M/UL (ref 4.2–5.4)
SODIUM SERPL-SCNC: 142 MMOL/L (ref 135–145)
WBC # BLD AUTO: 9.3 K/UL (ref 4.8–10.8)

## 2018-04-12 PROCEDURE — 82955 ASSAY OF G6PD ENZYME: CPT

## 2018-04-12 PROCEDURE — 86255 FLUORESCENT ANTIBODY SCREEN: CPT

## 2018-04-12 PROCEDURE — 72050 X-RAY EXAM NECK SPINE 4/5VWS: CPT

## 2018-04-12 PROCEDURE — 72100 X-RAY EXAM L-S SPINE 2/3 VWS: CPT

## 2018-04-12 PROCEDURE — 86704 HEP B CORE ANTIBODY TOTAL: CPT

## 2018-04-12 PROCEDURE — 87340 HEPATITIS B SURFACE AG IA: CPT

## 2018-04-12 PROCEDURE — 86160 COMPLEMENT ANTIGEN: CPT

## 2018-04-12 PROCEDURE — 86480 TB TEST CELL IMMUN MEASURE: CPT

## 2018-04-12 PROCEDURE — 80053 COMPREHEN METABOLIC PANEL: CPT

## 2018-04-12 PROCEDURE — 36415 COLL VENOUS BLD VENIPUNCTURE: CPT | Mod: GA

## 2018-04-12 PROCEDURE — 86038 ANTINUCLEAR ANTIBODIES: CPT

## 2018-04-12 PROCEDURE — 86225 DNA ANTIBODY NATIVE: CPT

## 2018-04-12 PROCEDURE — 82274 ASSAY TEST FOR BLOOD FECAL: CPT

## 2018-04-12 PROCEDURE — 86803 HEPATITIS C AB TEST: CPT

## 2018-04-12 PROCEDURE — 86812 HLA TYPING A B OR C: CPT | Mod: GA

## 2018-04-12 PROCEDURE — 85025 COMPLETE CBC W/AUTO DIFF WBC: CPT

## 2018-04-12 PROCEDURE — 86235 NUCLEAR ANTIGEN ANTIBODY: CPT | Mod: 91

## 2018-04-14 DIAGNOSIS — Z12.11 SCREEN FOR COLON CANCER: ICD-10-CM

## 2018-04-14 LAB
ANCA IGG TITR SER IF: NORMAL {TITER}
G6PD RBC-CCNC: 12.6 U/G HB (ref 9.9–16.6)
HLA-B27 QL FC: NEGATIVE

## 2018-04-14 PROCEDURE — 82274 ASSAY TEST FOR BLOOD FECAL: CPT

## 2018-04-15 LAB — NUCLEAR IGG SER QL IA: NORMAL

## 2018-04-16 LAB
HEMOCCULT STL QL IA: NEGATIVE
M TB TUBERC IFN-G BLD QL: NEGATIVE
M TB TUBERC IFN-G/MITOGEN IGNF BLD: 0.1
M TB TUBERC IGNF/MITOGEN IGNF CONTROL: 50.26 [IU]/ML
MITOGEN IGNF BCKGRD COR BLD-ACNC: 0.07 [IU]/ML

## 2018-04-17 ENCOUNTER — TELEPHONE (OUTPATIENT)
Dept: RHEUMATOLOGY | Facility: PHYSICIAN GROUP | Age: 83
End: 2018-04-17

## 2018-04-18 NOTE — TELEPHONE ENCOUNTER
Please call patient   Her GFR is low.  THis is an estimate of her kidney function.  I would recommend she decrease use of NSAIDS

## 2018-05-10 ENCOUNTER — OFFICE VISIT (OUTPATIENT)
Dept: RHEUMATOLOGY | Facility: PHYSICIAN GROUP | Age: 83
End: 2018-05-10
Payer: MEDICARE

## 2018-05-10 VITALS
SYSTOLIC BLOOD PRESSURE: 120 MMHG | WEIGHT: 189 LBS | DIASTOLIC BLOOD PRESSURE: 90 MMHG | HEART RATE: 75 BPM | OXYGEN SATURATION: 90 % | BODY MASS INDEX: 29.6 KG/M2 | RESPIRATION RATE: 14 BRPM | TEMPERATURE: 97.6 F

## 2018-05-10 DIAGNOSIS — R76.8 RHEUMATOID FACTOR POSITIVE: ICD-10-CM

## 2018-05-10 PROCEDURE — 99213 OFFICE O/P EST LOW 20 MIN: CPT | Performed by: INTERNAL MEDICINE

## 2018-05-10 ASSESSMENT — PAIN SCALES - GENERAL: PAINLEVEL: 5=MODERATE PAIN
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
## 2018-09-06 ENCOUNTER — PATIENT OUTREACH (OUTPATIENT)
Dept: HEALTH INFORMATION MANAGEMENT | Facility: OTHER | Age: 83
End: 2018-09-06

## 2018-09-06 ENCOUNTER — HOSPITAL ENCOUNTER (OUTPATIENT)
Dept: LAB | Facility: MEDICAL CENTER | Age: 83
End: 2018-09-06
Attending: NURSE PRACTITIONER
Payer: MEDICARE

## 2018-09-06 DIAGNOSIS — E11.51 DM (DIABETES MELLITUS) TYPE II, CONTROLLED, WITH PERIPHERAL VASCULAR DISORDER (HCC): ICD-10-CM

## 2018-09-06 DIAGNOSIS — E78.5 HYPERLIPIDEMIA LDL GOAL <100: ICD-10-CM

## 2018-09-06 DIAGNOSIS — E55.9 VITAMIN D DEFICIENCY: ICD-10-CM

## 2018-09-06 DIAGNOSIS — I10 ESSENTIAL HYPERTENSION, BENIGN: ICD-10-CM

## 2018-09-06 LAB
25(OH)D3 SERPL-MCNC: 29 NG/ML (ref 30–100)
ALBUMIN SERPL BCP-MCNC: 4.3 G/DL (ref 3.2–4.9)
ALBUMIN/GLOB SERPL: 1.3 G/DL
ALP SERPL-CCNC: 84 U/L (ref 30–99)
ALT SERPL-CCNC: 26 U/L (ref 2–50)
ANION GAP SERPL CALC-SCNC: 9 MMOL/L (ref 0–11.9)
AST SERPL-CCNC: 19 U/L (ref 12–45)
BILIRUB SERPL-MCNC: 0.5 MG/DL (ref 0.1–1.5)
BUN SERPL-MCNC: 24 MG/DL (ref 8–22)
CALCIUM SERPL-MCNC: 9.8 MG/DL (ref 8.5–10.5)
CHLORIDE SERPL-SCNC: 104 MMOL/L (ref 96–112)
CHOLEST SERPL-MCNC: 209 MG/DL (ref 100–199)
CO2 SERPL-SCNC: 26 MMOL/L (ref 20–33)
CREAT SERPL-MCNC: 1.07 MG/DL (ref 0.5–1.4)
CREAT UR-MCNC: 159.2 MG/DL
EST. AVERAGE GLUCOSE BLD GHB EST-MCNC: 163 MG/DL
FASTING STATUS PATIENT QL REPORTED: NORMAL
GLOBULIN SER CALC-MCNC: 3.2 G/DL (ref 1.9–3.5)
GLUCOSE SERPL-MCNC: 175 MG/DL (ref 65–99)
HBA1C MFR BLD: 7.3 % (ref 0–5.6)
HDLC SERPL-MCNC: 43 MG/DL
LDLC SERPL CALC-MCNC: 109 MG/DL
MICROALBUMIN UR-MCNC: 1.6 MG/DL
MICROALBUMIN/CREAT UR: 10 MG/G (ref 0–30)
POTASSIUM SERPL-SCNC: 4.1 MMOL/L (ref 3.6–5.5)
PROT SERPL-MCNC: 7.5 G/DL (ref 6–8.2)
SODIUM SERPL-SCNC: 139 MMOL/L (ref 135–145)
TRIGL SERPL-MCNC: 287 MG/DL (ref 0–149)

## 2018-09-06 PROCEDURE — 82306 VITAMIN D 25 HYDROXY: CPT

## 2018-09-06 PROCEDURE — 36415 COLL VENOUS BLD VENIPUNCTURE: CPT

## 2018-09-06 PROCEDURE — 83036 HEMOGLOBIN GLYCOSYLATED A1C: CPT | Mod: GA

## 2018-09-06 PROCEDURE — 80061 LIPID PANEL: CPT

## 2018-09-06 PROCEDURE — 82570 ASSAY OF URINE CREATININE: CPT

## 2018-09-06 PROCEDURE — 82043 UR ALBUMIN QUANTITATIVE: CPT

## 2018-09-06 PROCEDURE — 80053 COMPREHEN METABOLIC PANEL: CPT

## 2018-09-06 NOTE — PROGRESS NOTES
1. Attempt #:Final    2. HealthConnect Verified: no    3. Verify PCP: yes    4. Review Care Team: yes    5. WebIZ Checked & Epic Updated: Yes  · WebIZ Recommendations: FLU, TDAP and SHINGRIX (Shingles)  · Is patient due for Tdap? YES. Patient was not notified of copay/out of pocket cost.  · Is patient due for Shingles? YES. Patient was not notified of copay/out of pocket cost.    6. Communication Preference Obtained: yes    7. Annual Wellness Visit Scheduling  · Scheduling Status:Scheduled       9. Care Gap Scheduling (Attempt to Schedule EACH Overdue Care Gap!)     Health Maintenance Due   Topic Date Due   • IMM HEP B VACCINE (1 of 3 - Risk 3-dose series) 04/28/1954   • IMM DTaP/Tdap/Td Vaccine (1 - Tdap) 04/28/1954   • IMM ZOSTER VACCINES (1 of 2) 04/28/1987   • PFT SCREENING-FEV1 AND FEV/FVC RATIO / SPIROMETRY SHOULD BE PERFORMED ANNUALLY  02/28/2013   • Annual Wellness Visit  03/22/2017   • URINE ACR / MICROALBUMIN  02/28/2018   • COLONOSCOPY  04/01/2018   • MAMMOGRAM  06/15/2018   • RETINAL SCREENING  07/06/2018   • IMM INFLUENZA (1) 09/01/2018        Scheduled patient for Annual Wellness Visit     10. Pharmly Activation: declined    11. Pharmly Kandi: no    12. Virtual Visits: no    13. Opt In to Text Messages: no

## 2018-09-07 ENCOUNTER — HOSPITAL ENCOUNTER (OUTPATIENT)
Facility: MEDICAL CENTER | Age: 83
End: 2018-09-07
Attending: NURSE PRACTITIONER
Payer: MEDICARE

## 2018-09-07 PROCEDURE — 82274 ASSAY TEST FOR BLOOD FECAL: CPT

## 2018-09-09 LAB — HEMOCCULT STL QL IA: NEGATIVE

## 2018-09-10 ENCOUNTER — TELEPHONE (OUTPATIENT)
Dept: MEDICAL GROUP | Facility: MEDICAL CENTER | Age: 83
End: 2018-09-10

## 2018-09-10 NOTE — TELEPHONE ENCOUNTER
ANNUAL WELLNESS VISIT PRE-VISIT PLANNING WITH OUTREACH    1.  If any orders were placed at last visit, do we have Results/Consult Notes?        •  Labs - Labs ordered, completed on 9/6/18, 4/10/18 and results are in chart.  Note: If patient appointment is for lab review and patient did not complete labs, check with provider if OK to reschedule patient until labs completed.       •  Imaging - Imaging was not ordered at last office visit.       •  Referrals - No referrals were ordered at last office visit.    2.  Immunizations were updated in [facility] using WebIZ?:Yes       •  WebIZ Recommendations: FLU, TDAP and SHINGRIX (Shingles)       •  Is patient due for Tdap? YES. Patient was not notified of copay/out of pocket cost.       •  Is patient due for Shingles?YES. Patient was not notified of copay/out of pocket cost.    3.  Patient has the following Care Path diagnoses on Problem List:  DM (diabetes mellitus) (HCC), COPD (chronic obstructive pulmonary disease) (HCC)    4.  MDX printed for Provider? NO

## 2018-09-16 PROBLEM — E11.51 DM (DIABETES MELLITUS) TYPE II, CONTROLLED, WITH PERIPHERAL VASCULAR DISORDER (HCC): Status: ACTIVE | Noted: 2018-09-16

## 2018-09-17 ENCOUNTER — OFFICE VISIT (OUTPATIENT)
Dept: MEDICAL GROUP | Facility: MEDICAL CENTER | Age: 83
End: 2018-09-17
Payer: MEDICARE

## 2018-09-17 VITALS
DIASTOLIC BLOOD PRESSURE: 64 MMHG | HEIGHT: 67 IN | WEIGHT: 186 LBS | HEART RATE: 76 BPM | SYSTOLIC BLOOD PRESSURE: 128 MMHG | BODY MASS INDEX: 29.19 KG/M2 | OXYGEN SATURATION: 89 % | TEMPERATURE: 97.1 F

## 2018-09-17 DIAGNOSIS — H25.013 CORTICAL AGE-RELATED CATARACT OF BOTH EYES: ICD-10-CM

## 2018-09-17 DIAGNOSIS — K57.90 DIVERTICULOSIS OF INTESTINE WITHOUT BLEEDING, UNSPECIFIED INTESTINAL TRACT LOCATION: ICD-10-CM

## 2018-09-17 DIAGNOSIS — I10 ESSENTIAL HYPERTENSION, BENIGN: ICD-10-CM

## 2018-09-17 DIAGNOSIS — M85.89 OSTEOPENIA OF MULTIPLE SITES: ICD-10-CM

## 2018-09-17 DIAGNOSIS — H40.1234 BILATERAL LOW-TENSION GLAUCOMA, INDETERMINATE STAGE: ICD-10-CM

## 2018-09-17 DIAGNOSIS — N18.30 CKD (CHRONIC KIDNEY DISEASE) STAGE 3, GFR 30-59 ML/MIN (HCC): ICD-10-CM

## 2018-09-17 DIAGNOSIS — M19.90 ARTHRITIS: ICD-10-CM

## 2018-09-17 DIAGNOSIS — K21.9 GASTROESOPHAGEAL REFLUX DISEASE WITHOUT ESOPHAGITIS: ICD-10-CM

## 2018-09-17 DIAGNOSIS — E11.51 DM (DIABETES MELLITUS) TYPE II, CONTROLLED, WITH PERIPHERAL VASCULAR DISORDER (HCC): ICD-10-CM

## 2018-09-17 DIAGNOSIS — E55.9 VITAMIN D DEFICIENCY: ICD-10-CM

## 2018-09-17 DIAGNOSIS — J43.8 OTHER EMPHYSEMA (HCC): ICD-10-CM

## 2018-09-17 DIAGNOSIS — I44.2 COMPLETE HEART BLOCK (HCC): ICD-10-CM

## 2018-09-17 DIAGNOSIS — E78.5 HYPERLIPIDEMIA LDL GOAL <100: ICD-10-CM

## 2018-09-17 DIAGNOSIS — K22.2 ESOPHAGEAL STRICTURE: ICD-10-CM

## 2018-09-17 DIAGNOSIS — K44.9 HIATAL HERNIA: ICD-10-CM

## 2018-09-17 PROCEDURE — G0439 PPPS, SUBSEQ VISIT: HCPCS | Performed by: NURSE PRACTITIONER

## 2018-09-17 RX ORDER — FELODIPINE 10 MG/1
10 TABLET, EXTENDED RELEASE ORAL
Qty: 90 TAB | Refills: 3 | Status: SHIPPED | OUTPATIENT
Start: 2018-09-17 | End: 2019-01-02 | Stop reason: SDUPTHER

## 2018-09-17 RX ORDER — RABEPRAZOLE SODIUM 20 MG/1
20 TABLET, DELAYED RELEASE ORAL DAILY
Qty: 90 TAB | Refills: 3 | Status: SHIPPED | OUTPATIENT
Start: 2018-09-17 | End: 2019-01-02 | Stop reason: SDUPTHER

## 2018-09-17 RX ORDER — LOSARTAN POTASSIUM AND HYDROCHLOROTHIAZIDE 12.5; 5 MG/1; MG/1
1 TABLET ORAL
Qty: 90 TAB | Refills: 1 | Status: SHIPPED | OUTPATIENT
Start: 2018-09-17 | End: 2019-01-02 | Stop reason: SDUPTHER

## 2018-09-17 RX ORDER — FLUTICASONE PROPIONATE 50 MCG
2 SPRAY, SUSPENSION (ML) NASAL DAILY
Qty: 3 BOTTLE | Refills: 1 | Status: SHIPPED | OUTPATIENT
Start: 2018-09-17 | End: 2019-01-02 | Stop reason: SDUPTHER

## 2018-09-17 RX ORDER — ALBUTEROL SULFATE 90 UG/1
2 AEROSOL, METERED RESPIRATORY (INHALATION) EVERY 6 HOURS PRN
Qty: 3 INHALER | Refills: 1 | Status: SHIPPED | OUTPATIENT
Start: 2018-09-17 | End: 2019-09-12 | Stop reason: SDUPTHER

## 2018-09-17 RX ORDER — FELODIPINE 2.5 MG/1
2.5 TABLET, EXTENDED RELEASE ORAL
Qty: 90 TAB | Refills: 3 | Status: SHIPPED | OUTPATIENT
Start: 2018-09-17 | End: 2019-01-02 | Stop reason: SDUPTHER

## 2018-09-17 ASSESSMENT — ACTIVITIES OF DAILY LIVING (ADL): BATHING_REQUIRES_ASSISTANCE: 0

## 2018-09-17 ASSESSMENT — PAIN SCALES - GENERAL: PAINLEVEL: NO PAIN

## 2018-09-17 ASSESSMENT — PATIENT HEALTH QUESTIONNAIRE - PHQ9: CLINICAL INTERPRETATION OF PHQ2 SCORE: 0

## 2018-09-17 ASSESSMENT — ENCOUNTER SYMPTOMS: GENERAL WELL-BEING: FAIR

## 2018-09-17 NOTE — PROGRESS NOTES
Chief Complaint   Patient presents with   • Annual Wellness Visit         HPI:  Kerri is a 83 y.o. here for Medicare Annual Wellness Visit    Seen in f/u for HRA.  She hasnt been feeling well d/t SOB from COPD and recent PMA placement for CHB.   Reviewed lab with pt.  FIT, alb/cr ratio is wnl  a1c is up fronm 6.7 to 7.3.  Taking meds approp.  Not on a low carb diet.    GFR is up from 44 to 49.    CMP is wnl except glucose is high.  LP shows trg are up from 193 to 287.  prob d/t her diet.  HDL is still mildly low.  LDL is stable at 109 with gaol of <70.  She is allergic to statins after multiple tries.    Vitamin d is sl low at 29.  She is taking otc supplement daily .    Patient Active Problem List    Diagnosis Date Noted   • Complete heart block (HCC)    • CKD (chronic kidney disease) stage 3, GFR 30-59 ml/min    • DM (diabetes mellitus) type II, controlled, with peripheral vascular disorder (HCC) 09/16/2018   • Age-related cataract of both eyes    • Vitamin D deficiency    • Esophageal stricture    • Diverticulosis    • Osteopenia of multiple sites    • Hiatal hernia    • Arthritis    • Glaucoma    • Essential hypertension, benign 09/04/2012   • COPD (chronic obstructive pulmonary disease) (HCC) 04/05/2012   • Hyperlipidemia LDL goal <100 07/13/2010   • Preventative health care 07/24/2009   • GERD (gastroesophageal reflux disease) 07/24/2009     Vitamin D deficiency  Her current d is sl low at 29.  She is on otc supplement at 2000 units martinez.      Osteopenia of multiple sites  No hx of fx.  She is off ca++ d/t elevated ca and heart disease.  She is on otc d3.  Not able to exercise no w d/t her SOB.  Will start when that is improved.    Hyperlipidemia LDL goal <100  Current trg are up to 287.  Not on a good diet.  Not able to exercise now d/t PMA placed and SOB.  Will see pulm soon for tx.  HDL is low.  Not able to exercise.  LDL is high at 109. Cardiac has discussed repatha with her.  Will poss start at next  appt.    Hiatal hernia  Stable on meds.  No current sx.     Glaucoma  Followed by ophth - STanko.  She will see him wed.   It is stable.  She has had lens implants micky.  Since then her sx are improved.  No further meds needed for tx.      GERD (Gastroesophageal Reflux Disease)  She is on meds for that.  She was still having breakthrough sx with gaining weight.  She is now on mylanta with her aciphex.  ER provider told her to do that and its helping.     Essential hypertension, benign  Stable on meds    Esophageal stricture  No recent sx.  No tx needed    DM (diabetes mellitus) type II, controlled, with peripheral vascular disorder (HCC)  Her current a1c is up to 7.3.  Taking meds approp.  Will inc metformin to bid 500 mg.  She is not on a healthy diet. E ating lots of carbs.  Is going to stop that.     Diverticulosis  No current sx or signs of infection.  Not on a high fiber diet.     COPD (chronic obstructive pulmonary disease)  She is having more SOB.  meds not helping.  o2 sat today is 89%.  It will decrease with any activity.  She has appt with pul for further tx in nov.  She will see [facility].      Complete heart block (HCC)  PMA placed on 8/19/18 at [facility] by Dr Scotty Arias  She is followed here by Zoe.      CKD (chronic kidney disease) stage 3, GFR 30-59 ml/min  Her GFR shows up from 44 to 49.  Cr is wnl.  Alb/cr ratio is wnl.  Not seeing neph.  Will continue to monitor.    Arthritis  She is on otc glucosamine chondroitin and another other herb recommended by Dr Skinner.  She is followed by rheumatology.  Sx are controlled but she has more jpain in her.  She is  Going to continue to f/u with rheumatology.     Age-related cataract of both eyes  follewed by lise.  These have been removed. No current sx noted.         Patient Active Problem List    Diagnosis Date Noted   • DM (diabetes mellitus) type II, controlled, with peripheral vascular disorder (HCC) 09/16/2018   • Age-related  cataract of both eyes    • Vitamin D deficiency    • Esophageal stricture    • Diverticulosis    • Osteopenia of multiple sites    • Hiatal hernia    • Arthritis    • Glaucoma    • Essential hypertension, benign 09/04/2012   • COPD (chronic obstructive pulmonary disease) ([facility]) 04/05/2012   • Hyperlipidemia LDL goal <100 07/13/2010   • Preventative health care 07/24/2009   • GERD (gastroesophageal reflux disease) 07/24/2009       Current Outpatient Prescriptions   Medication Sig Dispense Refill   • Glucosamine HCl (GLUCOSAMINE PO) Take  by mouth.     • metFORMIN (GLUCOPHAGE) 500 MG Tab Take 1 Tab by mouth every day. 90 Tab 1   • losartan-hydrochlorothiazide (HYZAAR) 50-12.5 MG per tablet Take 1 Tab by mouth every day. 90 Tab 1   • fluticasone (FLONASE) 50 MCG/ACT nasal spray Spray 2 Sprays in nose every day. 3 Bottle 1   • Cholecalciferol (VITAMIN D3) 2000 UNIT Cap Take  by mouth.     • JANUVIA 100 MG Tab TAKE 1 TABLET BY MOUTH ONCE DAILY 90 Tab 0   • felodipine (PLENDIL) 2.5 MG TABLET SR 24 HR Take 1 Tab by mouth every day. 90 Tab 3   • felodipine (PLENDIL) 10 MG TABLET SR 24 HR Take 1 Tab by mouth every day. TAKE 1 TAB BY MOUTH EVERY DAY. 90 Tab 3   • rabeprazole (ACIPHEX) 20 MG tablet Take 1 Tab by mouth every day. Take on empty stomach in AM 90 Tab 3   • Indacaterol Maleate (ARCAPTA NEOHALER) 75 MCG Cap Inhale 1 Capsule by mouth every day. 90 Cap 3   • albuterol (PROAIR HFA) 108 (90 BASE) MCG/ACT Aero Soln inhalation aerosol Inhale 2 Puffs by mouth every 6 hours as needed for Shortness of Breath. 3 Inhaler 1   • Cyanocobalamin (B-12) 2500 MCG Tab Take  by mouth.     • amoxicillin (AMOXIL) 500 MG Cap TAKE 4 CAPSULES BY MOUTH 1 TIME FOR 1 DOSE 4 Cap 1   • ciprofloxacin (CIPRO) 500 MG Tab Take 1 Tab by mouth 2 times a day. 20 Tab 0   • Diclofenac Sodium 1 % Gel Apply 1 Application to skin as directed 2 Times a Day. 100 g 0   • tramadol (ULTRAM) 50 MG Tab Take 1 Tab by mouth every 24 hours as needed. 30 Tab 0   •  acetaminophen (TYLENOL) 325 MG Tab Take 650 mg by mouth every four hours as needed.     • CALTRATE 600+D PO Take 1 Tab by mouth every day.       No current facility-administered medications for this visit.         Patient is taking medications as noted in medication list.  Current supplements as per medication list.     Allergies: Sulfa drugs    Current social contact/activities: Pt plays cards, pt goes out to eat with  and spends time with friends and family.     Is patient current with immunizations? No, due for FLU, TDAP and SHINGRIX (Shingles). Patient is interested in receiving NONE today.    She  reports that she quit smoking about 27 years ago. Her smoking use included Cigarettes. She has a 112.00 pack-year smoking history. She has never used smokeless tobacco. She reports that she drinks alcohol. She reports that she does not use drugs.  Counseling given: Not Answered        DPA/Advanced directive: Patient has Advanced Directive, but it is not on file. Instructed to bring in a copy to scan into their chart.    ROS:    Gait: Uses no assistive device   Ostomy: No   Other tubes: No   Amputations: No   Chronic oxygen use Yes   Last eye exam pt will be having that done this with Dr. Fitzgerald on Wednesday    Wears hearing aids: No   : Reports urinary leakage during the last 6 months that has somewhat interfered with their daily activities or sleep.  Review of Systems   Constitutional: Negative.  Negative for fever, chills, weight loss, malaise/fatigue and diaphoresis.   HENT: Negative.  Negative for hearing loss, ear pain, nosebleeds, congestion, sore throat, neck pain, tinnitus and ear discharge.    Eyes: Negative.  Negative for blurred vision, double vision, photophobia, pain, discharge and redness.   Respiratory: Negative.  Negative for cough, hemoptysis, sputum production,  wheezing and stridor.    Cardiovascular: Negative.  Negative for chest pain, palpitations, orthopnea, claudication, leg swelling  and PND.   Gastrointestinal: Negative.  Negative for heartburn, nausea, vomiting, abdominal pain, diarrhea, constipation, blood in stool and melena.   Genitourinary: Negative.  Negative for dysuria, urgency, frequency, incontinence, hematuria and flank pain.   Musculoskeletal: Negative.  Negative for myalgias, back pain, falls.   Skin: Negative.  Negative for itching and rash.   Neurological: Negative.  Negative for dizziness, tingling, tremors, sensory change, speech change, focal weakness, seizures, loss of consciousness, weakness and headaches.   Endo/Heme/Allergies: Negative.  Negative for environmental allergies and polydipsia. Does not bruise/bleed easily.   Psychiatric/Behavioral: Negative.  Negative for depression, suicidal ideas, hallucinations, memory loss and substance abuse. The patient is not nervous/anxious and does not have insomnia.    All other systems reviewed and are negative.      Screening:    DIABETES    Has patient ever had diabetes education? Yes, and is NOT interested in more at this time.    COPD     1.  Is patient under the care of a pulmonologist? Yes, CareTeam was updated.  2.  Has patient ever completed a PFT or spirometry? Yes, on 5 or 6 months ago.  3.  Is patient on oxygen or CPAP? Yes, CareTeam was updated.  4.  Has patient ever had instruction on inhaler technique by health care professional? Yes  5.  Is patient interested in a referral to respiratory therapy for more information on COPD, inhaler technique, and/or information on establishing an action plan?  Yes, and is NOT interested in more at this time.          Depression Screening    Little interest or pleasure in doing things?  0 - not at all  Feeling down, depressed, or hopeless? 0 - not at all  Patient Health Questionnaire Score: 0    If depressive symptoms identified deferred to follow up visit unless specifically addressed in assessment and plan.    Interpretation of PHQ-9 Total Score   Score Severity   1-4 No Depression    5-9 Mild Depression   10-14 Moderate Depression   15-19 Moderately Severe Depression   20-27 Severe Depression    Screening for Cognitive Impairment    Three Minute Recall (leader, season, table)  3/3 Enedina, heaven, daughter  Shaan clock face with all 12 numbers and set the hands to show 10 past 11.  Yes 5/5  If cognitive concerns identified, deferred for follow up unless specifically addressed in assessment and plan.    Fall Risk Assessment    Has the patient had two or more falls in the last year or any fall with injury in the last year?  No  If fall risk identified, deferred for follow up unless specifically addressed in assessment and plan.    Safety Assessment    Throw rugs on floor.  Yes  Handrails on all stairs.  No  Good lighting in all hallways.  Yes  Difficulty hearing.  Yes mild  Patient counseled about all safety risks that were identified.    Functional Assessment ADLs    Are there any barriers preventing you from cooking for yourself or meeting nutritional needs?  No.    Are there any barriers preventing you from driving safely or obtaining transportation?  No.    Are there any barriers preventing you from using a telephone or calling for help?  No.    Are there any barriers preventing you from shopping?  No.    Are there any barriers preventing you from taking care of your own finances?  No.    Are there any barriers preventing you from managing your medications?  No.    Are there any barriers preventing you from showering, bathing or dressing yourself?  No.    Are you currently engaging in any exercise or physical activity?  No.     What is your perception of your health?  Fair.    Health Maintenance Summary                IMM HEP B VACCINE Overdue 4/28/1954     IMM DTaP/Tdap/Td Vaccine Overdue 4/28/1954     IMM ZOSTER VACCINES Overdue 4/28/1987     PFT SCREENING-FEV1 AND FEV/FVC RATIO / SPIROMETRY SHOULD BE PERFORMED ANNUALLY Overdue 2/28/2013      Done 2/29/2012 PFT DICTATED RESULTS     Patient  has more history with this topic...    Annual Wellness Visit Overdue 3/22/2017      Done 3/21/2016 Visit Dx: Medicare annual wellness visit, initial     Patient has more history with this topic...    COLONOSCOPY Overdue 4/1/2018      Done 4/1/2008 CT-VIRTUAL COLONOSCOPY-SCREEN      Patient has more history with this topic...    MAMMOGRAM Overdue 6/15/2018      Done 6/15/2017 MA-MAMMO SCREENING BILAT W/TOMOSYNTHESIS W/CAD     Patient has more history with this topic...    RETINAL SCREENING Overdue 7/6/2018      Done 7/6/2017 REFERRAL FOR RETINAL SCREENING EXAM    IMM INFLUENZA Overdue 9/1/2018      Done 9/2/2017 Imm Admin: Influenza Vaccine Adult HD     Patient has more history with this topic...    DIABETES MONOFILAMENT / LE EXAM Next Due 11/14/2018      Done 11/14/2017 AMB DIABETIC MONOFILAMENT LOWER EXTREMITY EXAM     Patient has more history with this topic...    A1C SCREENING Next Due 3/6/2019      Done 9/6/2018 HEMOGLOBIN A1C      Patient has more history with this topic...    IMM PNEUMOCOCCAL 65+ (ADULT) LOW/MEDIUM RISK SERIES Next Due 3/10/2019      Done 3/10/2018 Imm Admin: Pneumococcal Conjugate Vaccine (Prevnar/PCV-13)     Patient has more history with this topic...    BONE DENSITY Next Due 6/15/2019      Done 6/15/2017 DS-BONE DENSITY STUDY (DEXA)     Patient has more history with this topic...    FASTING LIPID PROFILE Next Due 9/6/2019      Done 9/6/2018 LIPID PROFILE      Patient has more history with this topic...    URINE ACR / MICROALBUMIN Next Due 9/6/2019      Done 9/6/2018 MICROALBUMIN CREAT RATIO URINE     Patient has more history with this topic...    SERUM CREATININE Next Due 9/6/2019      Done 9/6/2018 COMP METABOLIC PANEL      Patient has more history with this topic...          Patient Care Team:  ALISON Santacruz as PCP - General  Gagan Thurston D.O. (Cardiology)  Marcelo Mcdonald M.D. (Inactive) (Pulmonary Medicine)  ALISON Martines (Dermatology)  Mery Skinner M.D. as  "Consulting Physician (Rheumatology)  Boris Pereira M.D. as Consulting Physician (Pulmonary Medicine)  Norris Mcgraw M.D. as Consulting Physician (Otolaryngology)    Social History   Substance Use Topics   • Smoking status: Former Smoker     Packs/day: 2.00     Years: 56.00     Types: Cigarettes     Quit date: 4/5/1991   • Smokeless tobacco: Never Used   • Alcohol use 0.0 oz/week      Comment: rare     Family History   Problem Relation Age of Onset   • Cancer Mother         lung CA.   • Hypertension Mother    • Cancer Father         throat and brain   • Diabetes Father    • Hypertension Father    • Heart Disease Father    • Heart Disease Maternal Grandfather    • Cancer Paternal Grandmother         pancreatic     She  has a past medical history of Arthritis; CATARACT; COPD (chronic obstructive pulmonary disease) (HCC); Diverticulosis; DM (diabetes mellitus) (HCC); Dyslipidemia; Esophageal stricture; GERD (gastroesophageal reflux disease) (7/24/2009); Glaucoma; Hiatal hernia; HTN (hypertension); Osteopenia; and Vitamin D deficiency.   Past Surgical History:   Procedure Laterality Date   • TONSILLECTOMY AND ADENOIDECTOMY             Exam:     Blood pressure 128/64, pulse 76, temperature 36.2 °C (97.1 °F), height 1.702 m (5' 7\"), weight 84.4 kg (186 lb), SpO2 89 %. Body mass index is 29.13 kg/m².    Hearing fair.    Dentition good  Alert, oriented in no acute distress.  Eye contact is good, speech goal directed, affect calm  Physical Exam   Vitals reviewed.  Constitutional: oriented to person, place, and time. appears well-developed and well-nourished. No distress.   HENT: Head: Normocephalic and atraumatic. Bilateral tympanic membranes wnl w/o bulging.  Right Ear: External ear normal. Left Ear: External ear normal. Nose: Nose normal.  Mouth/Throat: Oropharynx is clear and moist. No oropharyngeal exudate. micky tm wnl. Eyes: Conjunctivae and EOM are normal. Pupils are equal, round, and reactive to light. Right eye " exhibits no discharge. Left eye exhibits no discharge. No scleral icterus.    Neck: Normal range of motion. Neck supple. No JVD present.   Cardiovascular: Normal rate, regular rhythm, normal heart sounds and intact distal pulses.  Exam reveals no gallop and no friction rub.  No murmur heard.  No carotid bruits   Pulmonary/Chest: Effort normal and breath sounds normal. No stridor. No respiratory distress. no wheezes or rales. exhibits no tenderness.   Abdominal: Soft. Bowel sounds are normal. exhibits no distension and no mass. No tenderness. no rebound and no guarding.   Musculoskeletal: Normal range of motion. exhibits no edema or tenderness.  micky pedal pulses 2+.  Lymphadenopathy:  no cervical or supraclavicular adenopathy.   Neurological: alert and oriented to person, place, and time. has normal reflexes. displays normal reflexes. No cranial nerve deficit. exhibits normal muscle tone. Coordination normal.   Skin: Skin is warm and dry. No rash noted. no diaphoresis. No erythema. No pallor.   Psychiatric: normal mood and affect. behavior is normal.         Assessment and Plan. The following treatment and monitoring plan is recommended:    1. Complete heart block (HCC)      continue to f/u with cardiac.  all is stable now   2. Other emphysema (HCC)  albuterol (PROAIR HFA) 108 (90 Base) MCG/ACT Aero Soln inhalation aerosol    fluticasone (FLONASE) 50 MCG/ACT nasal spray    Indacaterol Maleate (ARCAPTA NEOHALER) 75 MCG Cap    refilled meds.  followed by pulm.  having more sx.  f/u with them as sched   3. Essential hypertension, benign  felodipine (PLENDIL) 10 MG TABLET SR 24 HR    felodipine (PLENDIL) 2.5 MG TABLET SR 24 HR    losartan-hydrochlorothiazide (HYZAAR) 50-12.5 MG per tablet    controlled.  refilled meds.  f/u 3 months do lab before appt   4. Gastroesophageal reflux disease without esophagitis  rabeprazole (ACIPHEX) 20 MG tablet    refilled all meds.  had to add mylanta to aciphex d/t reoccurring sx.     5.  Vitamin D deficiency      continue otc supplement   6. Osteopenia of multiple sites      contine d3.  no ca now.  do exercise when able.  plan recheck dexa next year.    7. Hyperlipidemia LDL goal <100  LIPID PROFILE    improve diet and exercise.  recheck LP in 3 months.  f/u for review.     8. Hiatal hernia      stable on meds   9. Bilateral low-tension glaucoma, indeterminate stage      stable and followed by ophth   10. CKD (chronic kidney disease) stage 3, GFR 30-59 ml/min      GFR is dec but alb/cr ratio and cr are wnl. GFR is sl improved.  will continue to monitor   11. Esophageal stricture      resolved.  no sx of tx needed   12. DM (diabetes mellitus) type II, controlled, with peripheral vascular disorder (HCC)  SITagliptin (JANUVIA) 100 MG Tab    metFORMIN (GLUCOPHAGE) 500 MG Tab    HEMOGLOBIN A1C    a1c is up from last time.  inc metformin to 500 mg bid.  refilll all meds.    13. Diverticulosis of intestine without bleeding, unspecified intestinal tract location      no current sx.  will start fiber in diet.    14. Arthritis      stable on otc meds.     15. Cortical age-related cataract of both eyes      stable and resolved with surgery.  followed by ophth           Services suggested: No services needed at this time  Health Care Screening recommendations as per orders if indicated.  Referrals offered: PT/OT/Nutrition counseling/Behavioral Health/Smoking cessation as per orders if indicated.    Discussion today about general wellness and lifestyle habits:    · Prevent falls and reduce trip hazards; Cautioned about securing or removing rugs.  · Have a working fire alarm and carbon monoxide detector;   · Engage in regular physical activity and social activities       Follow-up: 3 months for lab review.

## 2018-09-17 NOTE — ASSESSMENT & PLAN NOTE
She is having more SOB.  meds not helping.  o2 sat today is 89%.  It will decrease with any activity.  She has appt with pulm for further tx in nov.  She will see Randall.

## 2018-09-17 NOTE — ASSESSMENT & PLAN NOTE
She is on meds for that.  She was still having breakthrough sx with gaining weight.  She is now on mylanta with her aciphex.  ER provider told her to do that and its helping.

## 2018-09-17 NOTE — ASSESSMENT & PLAN NOTE
Current trg are up to 287.  Not on a good diet.  Not able to exercise now d/t PMA placed and SOB.  Will see pulm soon for tx.  HDL is low.  Not able to exercise.  LDL is high at 109. Cardiac has discussed repatha with her.  Will poss start at next appt.

## 2018-09-17 NOTE — ASSESSMENT & PLAN NOTE
Her GFR shows up from 44 to 49.  Cr is wnl.  Alb/cr ratio is wnl.  Not seeing neph.  Will continue to monitor.

## 2018-09-17 NOTE — ASSESSMENT & PLAN NOTE
Followed by ophth - STanko.  She will see him wed.   It is stable.  She has had lens implants micky.  Since then her sx are improved.  No further meds needed for tx.

## 2018-09-17 NOTE — ASSESSMENT & PLAN NOTE
She is on otc glucosamine chondroitin and another other herb recommended by Dr Skinner.  She is followed by rheumatology.  Sx are controlled but she has more jpain in her.  She is  Going to continue to f/u with rheumatology.

## 2018-09-17 NOTE — ASSESSMENT & PLAN NOTE
Her current a1c is up to 7.3.  Taking meds approp.  Will inc metformin to bid 500 mg.  She is not on a healthy diet. E ating lots of carbs.  Is going to stop that.

## 2018-09-17 NOTE — ASSESSMENT & PLAN NOTE
No hx of fx.  She is off ca++ d/t elevated ca and heart disease.  She is on otc d3.  Not able to exercise no w d/t her SOB.  Will start when that is improved.

## 2018-12-20 ENCOUNTER — HOSPITAL ENCOUNTER (OUTPATIENT)
Dept: LAB | Facility: MEDICAL CENTER | Age: 83
End: 2018-12-20
Attending: NURSE PRACTITIONER
Payer: MEDICARE

## 2018-12-20 DIAGNOSIS — E78.5 HYPERLIPIDEMIA LDL GOAL <100: ICD-10-CM

## 2018-12-20 DIAGNOSIS — E11.51 DM (DIABETES MELLITUS) TYPE II, CONTROLLED, WITH PERIPHERAL VASCULAR DISORDER (HCC): ICD-10-CM

## 2018-12-20 LAB
CHOLEST SERPL-MCNC: 230 MG/DL (ref 100–199)
FASTING STATUS PATIENT QL REPORTED: NORMAL
HDLC SERPL-MCNC: 43 MG/DL
LDLC SERPL CALC-MCNC: 118 MG/DL
TRIGL SERPL-MCNC: 346 MG/DL (ref 0–149)

## 2018-12-20 PROCEDURE — 80061 LIPID PANEL: CPT

## 2018-12-20 PROCEDURE — 83036 HEMOGLOBIN GLYCOSYLATED A1C: CPT | Mod: GA

## 2018-12-20 PROCEDURE — 36415 COLL VENOUS BLD VENIPUNCTURE: CPT | Mod: GA

## 2018-12-21 LAB
EST. AVERAGE GLUCOSE BLD GHB EST-MCNC: 140 MG/DL
HBA1C MFR BLD: 6.5 % (ref 0–5.6)

## 2019-01-02 ENCOUNTER — OFFICE VISIT (OUTPATIENT)
Dept: MEDICAL GROUP | Facility: MEDICAL CENTER | Age: 84
End: 2019-01-02
Payer: MEDICARE

## 2019-01-02 VITALS
WEIGHT: 181 LBS | HEART RATE: 82 BPM | HEIGHT: 67 IN | TEMPERATURE: 97 F | SYSTOLIC BLOOD PRESSURE: 122 MMHG | DIASTOLIC BLOOD PRESSURE: 80 MMHG | BODY MASS INDEX: 28.41 KG/M2 | OXYGEN SATURATION: 91 %

## 2019-01-02 DIAGNOSIS — J43.8 OTHER EMPHYSEMA (HCC): ICD-10-CM

## 2019-01-02 DIAGNOSIS — E78.5 HYPERLIPIDEMIA LDL GOAL <100: ICD-10-CM

## 2019-01-02 DIAGNOSIS — K21.9 GASTROESOPHAGEAL REFLUX DISEASE WITHOUT ESOPHAGITIS: ICD-10-CM

## 2019-01-02 DIAGNOSIS — E11.51 DM (DIABETES MELLITUS) TYPE II, CONTROLLED, WITH PERIPHERAL VASCULAR DISORDER (HCC): ICD-10-CM

## 2019-01-02 DIAGNOSIS — I10 ESSENTIAL HYPERTENSION, BENIGN: ICD-10-CM

## 2019-01-02 PROCEDURE — 99214 OFFICE O/P EST MOD 30 MIN: CPT | Performed by: NURSE PRACTITIONER

## 2019-01-02 RX ORDER — FLUTICASONE PROPIONATE 50 MCG
2 SPRAY, SUSPENSION (ML) NASAL DAILY
Qty: 3 BOTTLE | Refills: 1 | Status: SHIPPED | OUTPATIENT
Start: 2019-01-02 | End: 2019-02-26 | Stop reason: SDUPTHER

## 2019-01-02 RX ORDER — FENOFIBRATE 48 MG/1
48 TABLET, COATED ORAL
Qty: 8 TAB | Refills: 2 | Status: SHIPPED | OUTPATIENT
Start: 2019-01-02 | End: 2019-02-26 | Stop reason: SDUPTHER

## 2019-01-02 RX ORDER — LOSARTAN POTASSIUM AND HYDROCHLOROTHIAZIDE 12.5; 5 MG/1; MG/1
1 TABLET ORAL
Qty: 90 TAB | Refills: 3 | Status: SHIPPED | OUTPATIENT
Start: 2019-01-02 | End: 2019-02-26 | Stop reason: SDUPTHER

## 2019-01-02 RX ORDER — FELODIPINE 2.5 MG/1
2.5 TABLET, EXTENDED RELEASE ORAL
Qty: 90 TAB | Refills: 3 | Status: SHIPPED | OUTPATIENT
Start: 2019-01-02 | End: 2019-09-12 | Stop reason: SDUPTHER

## 2019-01-02 RX ORDER — FELODIPINE 10 MG/1
10 TABLET, EXTENDED RELEASE ORAL
Qty: 90 TAB | Refills: 3 | Status: SHIPPED | OUTPATIENT
Start: 2019-01-02 | End: 2019-02-26 | Stop reason: SDUPTHER

## 2019-01-02 RX ORDER — RABEPRAZOLE SODIUM 20 MG/1
20 TABLET, DELAYED RELEASE ORAL DAILY
Qty: 90 TAB | Refills: 3 | Status: SHIPPED | OUTPATIENT
Start: 2019-01-02 | End: 2019-02-26 | Stop reason: SDUPTHER

## 2019-01-02 ASSESSMENT — PATIENT HEALTH QUESTIONNAIRE - PHQ9: CLINICAL INTERPRETATION OF PHQ2 SCORE: 0

## 2019-01-03 NOTE — PROGRESS NOTES
Subjective:     Kerri Newell is a 83 y.o. female who presents with  DM.    HPI:   Seen in f/u for DM.  She had a PMA dual chamber placed for SOB and bradycardia/CHB in august.  It has helped her SOB some.    She is having pulling and burning at the site since the pocket is not big enough.  She had it done in [place] with Dr Arias.    They are going back to [place] tomorrow.  She breaths better down there.  She uses o2 at nite and sometimes during the day.  She has changed her diet and no w on a strict low carb diet.    She is drinking more water.  She is feeling better.  She is still eating grapefruit almost daily.  Will check with pharmacy to see of ok to take with her meds.  Reviewed lab with pt.  Her a1c is down from 7.3 to 6.5.    LP is not at goal.  trg are up from 287 to 346.  HDL still low at 43.  LDL is up from 109 to 118 with goal of <100.  Not on meds.  Did not shaye statins.  Needs refills on meds.          Patient Active Problem List    Diagnosis Date Noted   • Complete heart block (HCC)    • CKD (chronic kidney disease) stage 3, GFR 30-59 ml/min ([facility])    • DM (diabetes mellitus) type II, controlled, with peripheral vascular disorder ([facility]) 09/16/2018   • Age-related cataract of both eyes    • Vitamin D deficiency    • Esophageal stricture    • Diverticulosis    • Osteopenia of multiple sites    • Hiatal hernia    • Arthritis    • Glaucoma    • Essential hypertension, benign 09/04/2012   • COPD (chronic obstructive pulmonary disease) ([facility]) 04/05/2012   • Hyperlipidemia LDL goal <100 07/13/2010   • Preventative health care 07/24/2009   • GERD (gastroesophageal reflux disease) 07/24/2009       Current medicines (including changes today)  Current Outpatient Prescriptions   Medication Sig Dispense Refill   • SITagliptin (JANUVIA) 100 MG Tab Take 1 Tab by mouth every day. 90 Tab 3   • rabeprazole (ACIPHEX) 20 MG tablet Take 1 Tab by mouth every day. Take on empty stomach in AM 90 Tab 3   • metFORMIN  (GLUCOPHAGE) 500 MG Tab Take 1 Tab by mouth 2 times a day, with meals. 180 Tab 1   • losartan-hydrochlorothiazide (HYZAAR) 50-12.5 MG per tablet Take 1 Tab by mouth every day. 90 Tab 3   • felodipine (PLENDIL) 2.5 MG TABLET SR 24 HR Take 1 Tab by mouth every day. 90 Tab 3   • felodipine (PLENDIL) 10 MG TABLET SR 24 HR Take 1 Tab by mouth every day. TAKE 1 TAB BY MOUTH EVERY DAY. 90 Tab 3   • Indacaterol Maleate (ARCAPTA NEOHALER) 75 MCG Cap Inhale 1 Capsule by mouth every day. 90 Cap 3   • fluticasone (FLONASE) 50 MCG/ACT nasal spray Spray 2 Sprays in nose every day. 3 Bottle 1   • fenofibrate (TRICOR) 48 MG Tab Take 1 Tab by mouth every 72 hours. 8 Tab 2   • Glucosamine HCl (GLUCOSAMINE PO) Take  by mouth.     • albuterol (PROAIR HFA) 108 (90 Base) MCG/ACT Aero Soln inhalation aerosol Inhale 2 Puffs by mouth every 6 hours as needed for Shortness of Breath. 3 Inhaler 1   • amoxicillin (AMOXIL) 500 MG Cap TAKE 4 CAPSULES BY MOUTH 1 TIME FOR 1 DOSE 4 Cap 1   • Diclofenac Sodium 1 % Gel Apply 1 Application to skin as directed 2 Times a Day. 100 g 0   • Cholecalciferol (VITAMIN D3) 2000 UNIT Cap Take  by mouth.     • acetaminophen (TYLENOL) 325 MG Tab Take 650 mg by mouth every four hours as needed.     • Cyanocobalamin (B-12) 2500 MCG Tab Take  by mouth.     • CALTRATE 600+D PO Take 1 Tab by mouth every day.       No current facility-administered medications for this visit.        Allergies   Allergen Reactions   • Sulfa Drugs        ROS  Constitutional: Negative. Negative for fever, chills, weight loss, malaise/fatigue and diaphoresis.   HENT: Negative. Negative for hearing loss, ear pain, nosebleeds, congestion, sore throat, neck pain, tinnitus and ear discharge.   Respiratory: Negative. Negative for cough, hemoptysis, sputum production, wheezing and stridor.   Cardiovascular: Negative. Negative for chest pain, palpitations, orthopnea, claudication, leg swelling and PND.   Gastrointestinal: Denies nausea, vomiting,  "diarrhea, constipation, heartburn, melena or hematochezia.  Genitourinary: Denies dysuria, hematuria, urinary incontinence, frequency or urgency.        Objective:     Blood pressure 122/80, pulse 82, temperature 36.1 °C (97 °F), temperature source Temporal, height 1.702 m (5' 7\"), weight 82.1 kg (181 lb), SpO2 91 %, not currently breastfeeding. Body mass index is 28.35 kg/m².    Physical Exam:  Vitals reviewed.  Constitutional: Oriented to person, place, and time. appears well-developed and well-nourished. No distress.   Cardiovascular: Normal rate, regular rhythm, normal heart sounds and intact distal pulses. Exam reveals no gallop and no friction rub. No murmur heard. No carotid bruits.   Pulmonary/Chest: Effort normal and breath sounds normal. No stridor. No respiratory distress. no wheezes or rales. exhibits no tenderness.   Musculoskeletal: Normal range of motion. exhibits no edema. micky pedal pulses 2+.  Lymphadenopathy: No cervical or supraclavicular adenopathy.   Neurological: Alert and oriented to person, place, and time. exhibits normal muscle tone.  Skin: Skin is warm and dry. No diaphoresis.   Psychiatric: Normal mood and affect. Behavior is normal.   Monofilament foot exam:  Sensation intact micky feet, no maceration or ulcerations.  2+ micky pedal pulses     Assessment and Plan:     The following treatment plan was discussed:    1. Hyperlipidemia LDL goal <100  fenofibrate (TRICOR) 48 MG Tab    COMP METABOLIC PANEL    Lipid Profile    not controlled.  did not shaye statins.  will try tricor 2x/wk for elevated trg.  recheck CMP, LP in 6 weeks.   f/u wiht pt with results.     2. DM (diabetes mellitus) type II, controlled, with peripheral vascular disorder (HCC)  SITagliptin (JANUVIA) 100 MG Tab    metFORMIN (GLUCOPHAGE) 500 MG Tab    HEMOGLOBIN A1C    MICROALBUMIN CREAT RATIO URINE    Diabetic Monofilament LE Exam    better controlled.  continue low carb diet.  inc exercise.  repeat a1c, alb/cr ratio in april. "  f/u for review.  will be oot till 4/19   3. Gastroesophageal reflux disease without esophagitis  rabeprazole (ACIPHEX) 20 MG tablet    refilled all meds.     4. Essential hypertension, benign  losartan-hydrochlorothiazide (HYZAAR) 50-12.5 MG per tablet    felodipine (PLENDIL) 2.5 MG TABLET SR 24 HR    felodipine (PLENDIL) 10 MG TABLET SR 24 HR    controlled.  refilled meds.     5. Other emphysema (HCC)  Indacaterol Maleate (ARCAPTA NEOHALER) 75 MCG Cap    fluticasone (FLONASE) 50 MCG/ACT nasal spray    refilled meds.           Followup: Return in about 4 months (around 5/2/2019).

## 2019-02-22 LAB
ALBUMIN SERPL-MCNC: 4.3 G/DL (ref 3.5–4.7)
ALBUMIN/GLOB SERPL: 1.3 {RATIO} (ref 1.2–2.2)
ALP SERPL-CCNC: 100 IU/L (ref 39–117)
ALT SERPL-CCNC: 28 IU/L (ref 0–32)
AST SERPL-CCNC: 19 IU/L (ref 0–40)
BILIRUB SERPL-MCNC: 0.3 MG/DL (ref 0–1.2)
BUN SERPL-MCNC: 20 MG/DL (ref 8–27)
BUN/CREAT SERPL: 21 (ref 12–28)
CALCIUM SERPL-MCNC: 10.3 MG/DL (ref 8.7–10.3)
CHLORIDE SERPL-SCNC: 105 MMOL/L (ref 96–106)
CHOLEST SERPL-MCNC: 195 MG/DL (ref 100–199)
CO2 SERPL-SCNC: 21 MMOL/L (ref 20–29)
CREAT SERPL-MCNC: 0.97 MG/DL (ref 0.57–1)
GLOBULIN SER CALC-MCNC: 3.4 G/DL (ref 1.5–4.5)
GLUCOSE SERPL-MCNC: 173 MG/DL (ref 65–99)
HDLC SERPL-MCNC: 45 MG/DL
LABORATORY COMMENT REPORT: ABNORMAL
LDLC SERPL CALC-MCNC: 92 MG/DL (ref 0–99)
POTASSIUM SERPL-SCNC: 4.4 MMOL/L (ref 3.5–5.2)
PROT SERPL-MCNC: 7.7 G/DL (ref 6–8.5)
SODIUM SERPL-SCNC: 141 MMOL/L (ref 134–144)
TRIGL SERPL-MCNC: 290 MG/DL (ref 0–149)
VLDLC SERPL CALC-MCNC: 58 MG/DL (ref 5–40)

## 2019-02-25 ENCOUNTER — TELEPHONE (OUTPATIENT)
Dept: MEDICAL GROUP | Facility: MEDICAL CENTER | Age: 84
End: 2019-02-25

## 2019-02-25 NOTE — TELEPHONE ENCOUNTER
----- Message from ALISON Santacruz sent at 2/24/2019  1:28 PM PST -----  Please have pt set appointment to review labs.

## 2019-02-25 NOTE — LETTER
February 25, 2019        Kerri Newell  [address] [facility] [address]        Dear Kerri:     After careful review of your chart, we have noted you are due for a follow up appointment.  We request you call our office at [phone] at your earliest convenience and make an appointment.     We look forward to scheduling an appointment for you, so that we may provide you with the safest and most complete medical care.        If you have any questions or concerns, please don't hesitate to call.        Sincerely,        ADILENE Santacruz.    Electronically Signed

## 2019-02-26 DIAGNOSIS — I10 ESSENTIAL HYPERTENSION, BENIGN: ICD-10-CM

## 2019-02-26 DIAGNOSIS — J43.8 OTHER EMPHYSEMA (HCC): ICD-10-CM

## 2019-02-26 DIAGNOSIS — E11.51 DM (DIABETES MELLITUS) TYPE II, CONTROLLED, WITH PERIPHERAL VASCULAR DISORDER (HCC): ICD-10-CM

## 2019-02-26 DIAGNOSIS — E78.5 HYPERLIPIDEMIA LDL GOAL <100: ICD-10-CM

## 2019-02-26 DIAGNOSIS — K21.9 GASTROESOPHAGEAL REFLUX DISEASE WITHOUT ESOPHAGITIS: ICD-10-CM

## 2019-02-26 RX ORDER — RABEPRAZOLE SODIUM 20 MG/1
20 TABLET, DELAYED RELEASE ORAL DAILY
Qty: 90 TAB | Refills: 1 | Status: SHIPPED | OUTPATIENT
Start: 2019-02-26 | End: 2019-09-12 | Stop reason: SDUPTHER

## 2019-02-26 RX ORDER — FENOFIBRATE 48 MG/1
48 TABLET, COATED ORAL
Qty: 24 TAB | Refills: 1 | Status: SHIPPED | OUTPATIENT
Start: 2019-02-26 | End: 2019-09-03 | Stop reason: SDUPTHER

## 2019-02-26 RX ORDER — LOSARTAN POTASSIUM AND HYDROCHLOROTHIAZIDE 12.5; 5 MG/1; MG/1
1 TABLET ORAL
Qty: 90 TAB | Refills: 1 | Status: SHIPPED | OUTPATIENT
Start: 2019-02-26 | End: 2019-09-12 | Stop reason: SDUPTHER

## 2019-02-26 RX ORDER — FELODIPINE 10 MG/1
10 TABLET, EXTENDED RELEASE ORAL
Qty: 90 TAB | Refills: 1 | Status: SHIPPED | OUTPATIENT
Start: 2019-02-26 | End: 2019-09-12 | Stop reason: SDUPTHER

## 2019-02-26 RX ORDER — FLUTICASONE PROPIONATE 50 MCG
2 SPRAY, SUSPENSION (ML) NASAL DAILY
Qty: 3 BOTTLE | Refills: 1 | Status: SHIPPED | OUTPATIENT
Start: 2019-02-26 | End: 2019-09-12 | Stop reason: SDUPTHER

## 2019-04-03 ENCOUNTER — HOSPITAL ENCOUNTER (OUTPATIENT)
Dept: LAB | Facility: MEDICAL CENTER | Age: 84
End: 2019-04-03
Attending: NURSE PRACTITIONER
Payer: MEDICARE

## 2019-04-03 DIAGNOSIS — E11.51 DM (DIABETES MELLITUS) TYPE II, CONTROLLED, WITH PERIPHERAL VASCULAR DISORDER (HCC): ICD-10-CM

## 2019-04-03 LAB
CREAT UR-MCNC: 70.2 MG/DL
EST. AVERAGE GLUCOSE BLD GHB EST-MCNC: 143 MG/DL
HBA1C MFR BLD: 6.6 % (ref 0–5.6)
MICROALBUMIN UR-MCNC: <0.7 MG/DL
MICROALBUMIN/CREAT UR: NORMAL MG/G (ref 0–30)

## 2019-04-03 PROCEDURE — 82043 UR ALBUMIN QUANTITATIVE: CPT

## 2019-04-03 PROCEDURE — 83036 HEMOGLOBIN GLYCOSYLATED A1C: CPT | Mod: GA

## 2019-04-03 PROCEDURE — 82570 ASSAY OF URINE CREATININE: CPT

## 2019-04-03 PROCEDURE — 36415 COLL VENOUS BLD VENIPUNCTURE: CPT | Mod: GA

## 2019-04-08 ENCOUNTER — OFFICE VISIT (OUTPATIENT)
Dept: MEDICAL GROUP | Facility: MEDICAL CENTER | Age: 84
End: 2019-04-08
Payer: MEDICARE

## 2019-04-08 VITALS
TEMPERATURE: 94 F | SYSTOLIC BLOOD PRESSURE: 122 MMHG | BODY MASS INDEX: 28.25 KG/M2 | DIASTOLIC BLOOD PRESSURE: 64 MMHG | HEIGHT: 67 IN | WEIGHT: 180 LBS | HEART RATE: 80 BPM | OXYGEN SATURATION: 92 %

## 2019-04-08 DIAGNOSIS — E11.51 DM (DIABETES MELLITUS) TYPE II, CONTROLLED, WITH PERIPHERAL VASCULAR DISORDER (HCC): ICD-10-CM

## 2019-04-08 DIAGNOSIS — M25.511 CHRONIC RIGHT SHOULDER PAIN: ICD-10-CM

## 2019-04-08 DIAGNOSIS — M50.30 DDD (DEGENERATIVE DISC DISEASE), CERVICAL: ICD-10-CM

## 2019-04-08 DIAGNOSIS — N95.9 POST MENOPAUSAL PROBLEMS: ICD-10-CM

## 2019-04-08 DIAGNOSIS — M51.36 DDD (DEGENERATIVE DISC DISEASE), LUMBAR: ICD-10-CM

## 2019-04-08 DIAGNOSIS — J30.1 CHRONIC ALLERGIC RHINITIS DUE TO POLLEN: ICD-10-CM

## 2019-04-08 DIAGNOSIS — G89.29 CHRONIC RIGHT SHOULDER PAIN: ICD-10-CM

## 2019-04-08 DIAGNOSIS — E78.5 HYPERLIPIDEMIA LDL GOAL <100: ICD-10-CM

## 2019-04-08 DIAGNOSIS — H81.13 BPPV (BENIGN PAROXYSMAL POSITIONAL VERTIGO), BILATERAL: ICD-10-CM

## 2019-04-08 PROCEDURE — 99214 OFFICE O/P EST MOD 30 MIN: CPT | Performed by: NURSE PRACTITIONER

## 2019-04-08 NOTE — PROGRESS NOTES
Subjective:     Kerri Newell is a 83 y.o. female who presents with DM.     HPI:   Seen in f/u for DM.  She is stable on her meds.  Rosette meds well w/o s/e  She has a hx of rt shoulder pain after a fall.  She did PT and it helped her sx until last wk.  Her initial injury was in 12/17.  Then did PT and it helped.  Now having pain again.  Sometimes severe.  Wants to redo PT.     She is due dexa scan.   Reviewed lab with pt.  Her a1c is up from 6.5 to 6.6.    LP shows dramatic improvement.  trg are down from 346 to 290.  HDL is up from 43. To 45.  LDL is down from 118 to 92.  She has been much more active with living in [place].  Doing lots of stairs.  Trying to eat healthy.   She is having allergies.  She is using 1 tspn daily of local honey.  It is helping her sx but she just started 1 wk ago.  No sx infection.  She had a inner ear infection last fall.  Since then she will have dizziness with lying in bed.  She is also getting shooting pain in her ear.  Her infection was in jan.    She went to see Dr Devi for her rt hip and leg.  She is also having neck and back pain.  xrays have shown multiple levels of DDD with some severe areas. Not doing anything for pain now except heat and tyl      Patient Active Problem List    Diagnosis Date Noted   • Complete heart block (HCC)    • CKD (chronic kidney disease) stage 3, GFR 30-59 ml/min ([facility])    • DM (diabetes mellitus) type II, controlled, with peripheral vascular disorder ([facility]) 09/16/2018   • Age-related cataract of both eyes    • Vitamin D deficiency    • Esophageal stricture    • Diverticulosis    • Osteopenia of multiple sites    • Hiatal hernia    • Arthritis    • Glaucoma    • Essential hypertension, benign 09/04/2012   • COPD (chronic obstructive pulmonary disease) (HCC) 04/05/2012   • Hyperlipidemia LDL goal <100 07/13/2010   • Preventative health care 07/24/2009   • GERD (gastroesophageal reflux disease) 07/24/2009       Current medicines (including changes  today)  Current Outpatient Prescriptions   Medication Sig Dispense Refill   • fenofibrate (TRICOR) 48 MG Tab Take 1 Tab by mouth every 72 hours. 24 Tab 1   • fluticasone (FLONASE) 50 MCG/ACT nasal spray Spray 2 Sprays in nose every day. 3 Bottle 1   • Indacaterol Maleate (ARCAPTA NEOHALER) 75 MCG Cap Inhale 1 Capsule by mouth every day. 90 Cap 1   • losartan-hydrochlorothiazide (HYZAAR) 50-12.5 MG per tablet Take 1 Tab by mouth every day. 90 Tab 1   • metFORMIN (GLUCOPHAGE) 500 MG Tab Take 1 Tab by mouth 2 times a day, with meals. 180 Tab 1   • rabeprazole (ACIPHEX) 20 MG tablet Take 1 Tab by mouth every day. Take on empty stomach in AM 90 Tab 1   • SITagliptin (JANUVIA) 100 MG Tab Take 1 Tab by mouth every day. 90 Tab 1   • felodipine (PLENDIL) 10 MG TABLET SR 24 HR Take 1 Tab by mouth every day. TAKE 1 TAB BY MOUTH EVERY DAY. 90 Tab 1   • felodipine (PLENDIL) 2.5 MG TABLET SR 24 HR Take 1 Tab by mouth every day. 90 Tab 3   • Glucosamine HCl (GLUCOSAMINE PO) Take  by mouth.     • albuterol (PROAIR HFA) 108 (90 Base) MCG/ACT Aero Soln inhalation aerosol Inhale 2 Puffs by mouth every 6 hours as needed for Shortness of Breath. 3 Inhaler 1   • amoxicillin (AMOXIL) 500 MG Cap TAKE 4 CAPSULES BY MOUTH 1 TIME FOR 1 DOSE 4 Cap 1   • Diclofenac Sodium 1 % Gel Apply 1 Application to skin as directed 2 Times a Day. 100 g 0   • Cholecalciferol (VITAMIN D3) 2000 UNIT Cap Take  by mouth.     • acetaminophen (TYLENOL) 325 MG Tab Take 650 mg by mouth every four hours as needed.     • Cyanocobalamin (B-12) 2500 MCG Tab Take  by mouth.     • CALTRATE 600+D PO Take 1 Tab by mouth every day.       No current facility-administered medications for this visit.        Allergies   Allergen Reactions   • Sulfa Drugs        ROS  Constitutional: Negative. Negative for fever, chills, weight loss, malaise/fatigue and diaphoresis.   HENT: Negative. Negative for hearing loss, ear pain, nosebleeds, congestion, sore throat, neck pain, tinnitus and  "ear discharge.   Respiratory: Negative. Negative for cough, hemoptysis, sputum production, shortness of breath, wheezing and stridor.   Cardiovascular: Negative. Negative for chest pain, palpitations, orthopnea, claudication, leg swelling and PND.   Gastrointestinal: Denies nausea, vomiting, diarrhea, constipation, heartburn, melena or hematochezia.  Genitourinary: Denies dysuria, hematuria, urinary incontinence, frequency or urgency.        Objective:     /64 (BP Location: Right arm, Patient Position: Sitting)   Pulse 80   Temp (!) 34.4 °C (94 °F) (Temporal)   Ht 1.702 m (5' 7\")   Wt 81.6 kg (180 lb)   SpO2 92%  Body mass index is 28.19 kg/m².    Physical Exam:  Physical Exam   Vitals reviewed.  Constitutional: oriented to person, place, and time. appears well-developed and well-nourished. No distress.   HENT:  Head: Normocephalic and atraumatic. Right Ear: External ear normal. Left Ear: External ear normal. Nose: Nose normal. Mouth/Throat: Oropharynx is clear and moist. No oropharyngeal exudate.  micky tm wnl.  Eyes: Right eye exhibits no discharge. Left eye exhibits no discharge. No scleral icterus.  Neck: No JVD present.  Cardiovascular: Normal rate, regular rhythm, normal heart sounds and intact distal pulses.  Exam reveals no gallop and no friction rub.  No murmur heard.  No carotid bruits.   Pulmonary/Chest: Effort normal and breath sounds normal. No stridor. No respiratory distress. no wheezes or rales. exhibits no tenderness.   Musculoskeletal: Normal range of motion. exhibits no edema. micky pedal pulses 2+.  Lymphadenopathy: no cervical or supraclavicular adenopathy.   Neurological: alert and oriented to person, place, and time. exhibits normal muscle tone. Coordination normal.   Skin: Skin is warm and dry. no diaphoresis.   Psychiatric: normal mood and affect. behavior is normal.        Assessment and Plan:     The following treatment plan was discussed:    1. DM (diabetes mellitus) type II, " controlled, with peripheral vascular disorder (HCC)  HEMOGLOBIN A1C    a1c is sl elevated but controlled on meds.  repeat lab in 4 months.  f/u for review   2. Chronic right shoulder pain  REFERRAL TO PHYSICAL THERAPY Reason for Therapy: Eval/Treat/Report    REFERRAL TO PHYSIATRY (PMR)    refer to physiatry for poss injections for pain in multiple joints   3. BPPV (benign paroxysmal positional vertigo), bilateral  REFERRAL TO PHYSICAL THERAPY Reason for Therapy: Eval/Treat/Report    REFERRAL TO PHYSIATRY (PMR)    refer PT for both her rt shoulder and dizziness   4. Hyperlipidemia LDL goal <100  Lipid Profile    improved on meds.  trg still not at goal but HDL and LDL are controlled.  continue diet and activitiy  repeat lab 4 months.  fu for review   5. Chronic allergic rhinitis due to pollen  REFERRAL TO PHYSICAL THERAPY Reason for Therapy: Eval/Treat/Report    REFERRAL TO PHYSIATRY (PMR)    continue local honey for control of sx   6. DDD (degenerative disc disease), lumbar  REFERRAL TO PHYSICAL THERAPY Reason for Therapy: Eval/Treat/Report    REFERRAL TO PHYSIATRY (PMR)    see if physiatry can help   7. DDD (degenerative disc disease), cervical  REFERRAL TO PHYSICAL THERAPY Reason for Therapy: Eval/Treat/Report    REFERRAL TO PHYSIATRY (PMR)    refer to physiatry for poss injections d/t pain   8. Post menopausal problems  DS-BONE DENSITY STUDY (DEXA)    last dexa scan is abn.  recheck dexa. f/u wiht pt with results         Followup: Return in about 4 months (around 8/8/2019).

## 2019-05-07 ENCOUNTER — TELEPHONE (OUTPATIENT)
Dept: MEDICAL GROUP | Facility: MEDICAL CENTER | Age: 84
End: 2019-05-07

## 2019-05-07 DIAGNOSIS — M19.90 ARTHRITIS: ICD-10-CM

## 2019-05-08 NOTE — TELEPHONE ENCOUNTER
i think she just needs to f/u in the office that she was seeing them.  They should be setting her up with new provider.  Have her check with them first.  If they are not doing that then i will do new referral

## 2019-05-09 ENCOUNTER — PHYSICAL THERAPY (OUTPATIENT)
Dept: PHYSICAL THERAPY | Facility: REHABILITATION | Age: 84
End: 2019-05-09
Attending: NURSE PRACTITIONER
Payer: MEDICARE

## 2019-05-09 DIAGNOSIS — G89.29 CHRONIC RIGHT SHOULDER PAIN: ICD-10-CM

## 2019-05-09 DIAGNOSIS — H81.13 BPPV (BENIGN PAROXYSMAL POSITIONAL VERTIGO), BILATERAL: ICD-10-CM

## 2019-05-09 DIAGNOSIS — M50.30 DDD (DEGENERATIVE DISC DISEASE), CERVICAL: ICD-10-CM

## 2019-05-09 DIAGNOSIS — M51.36 DDD (DEGENERATIVE DISC DISEASE), LUMBAR: ICD-10-CM

## 2019-05-09 DIAGNOSIS — M25.511 CHRONIC RIGHT SHOULDER PAIN: ICD-10-CM

## 2019-05-09 PROCEDURE — 97535 SELF CARE MNGMENT TRAINING: CPT

## 2019-05-09 PROCEDURE — 95992 CANALITH REPOSITIONING PROC: CPT

## 2019-05-09 PROCEDURE — 97162 PT EVAL MOD COMPLEX 30 MIN: CPT

## 2019-05-09 ASSESSMENT — ENCOUNTER SYMPTOMS
PAIN TIMING: WHEN ACTIVE
PAIN SCALE: 2
PAIN SCALE AT HIGHEST: 8
ALLEVIATING FACTORS: HEAT APPLICATION
EXACERBATED BY: CARRYING
PAIN TIMING: ALL DAY
PAIN SCALE AT LOWEST: 2

## 2019-05-09 NOTE — OP THERAPY EVALUATION
Outpatient Physical Therapy  VESTIBULAR EVALUATION    [address]  [address]  [place] [address]  Phone:  [phone]  Fax:  [phone]    Date of Evaluation: 05/09/2019    Patient: Kerri Newell  YOB: 1935  MRN: [id number]     Referring Provider: No referring provider defined for this encounter.   Referring Diagnosis: Pain in right shoulder [M25.511];Other chronic pain [G89.29];Benign paroxysmal vertigo, bilateral [H81.13]     Time Calculation                 Chief Complaint: No chief complaint on file.    Visit Diagnoses     ICD-10-CM   1. BPPV (benign paroxysmal positional vertigo), bilateral H81.13   2. Chronic right shoulder pain M25.511    G89.29   3. Chronic allergic rhinitis due to pollen J30.1   4. DDD (degenerative disc disease), lumbar M51.36   5. DDD (degenerative disc disease), cervical M50.30       Subjective    Past Medical History:   Diagnosis Date   • Arthritis     osteoarthritis   • CATARACT    • CKD (chronic kidney disease) stage 3, GFR 30-59 ml/min ([facility])    • Complete heart block (HCC)     PMA placed on 8/19/18 at [facility] by Dr Scotty Arias   • COPD (chronic obstructive pulmonary disease) ([facility])    • Diverticulosis    • DM (diabetes mellitus) ([facility])    • Dyslipidemia    • Esophageal stricture     with dilation   • GERD (gastroesophageal reflux disease) 7/24/2009   • Glaucoma    • Hiatal hernia    • HTN (hypertension)    • Osteopenia    • Vitamin D deficiency      Past Surgical History:   Procedure Laterality Date   • TONSILLECTOMY AND ADENOIDECTOMY       Social History   Substance Use Topics   • Smoking status: Former Smoker     Packs/day: 2.00     Years: 56.00     Types: Cigarettes     Quit date: 4/5/1991   • Smokeless tobacco: Never Used   • Alcohol use 0.0 oz/week      Comment: rare     Family and Occupational History     Social History   • Marital status:      Spouse name: N/A   • Number of children: N/A    • Years of education: N/A       Objective    Exercises/Treatment  Time-based treatments/modalities:          Assessment & Plan    Functional Limitations and Severity Modifiers        Referring provider co-signature:  I have reviewed this plan of care and my co-signature certifies the need for services.  Certification Dates:   From 5/9/19     To 7/4/19    Physician Signature: ________________________________ Date: ______________

## 2019-05-09 NOTE — OP THERAPY EVALUATION
"  Outpatient Physical Therapy  INITIAL EVALUATION    [facility] Physical Therapy [address].  [address]  [place] [address]  Phone:  [phone]  Fax:  [phone]    Date of Evaluation: 05/09/2019    Patient: Kerri Newell  YOB: 1935  MRN: [id number]     Referring Provider: Adrienne HEAD   Referring Diagnosis Pain in right shoulder [M25.511];Other chronic pain [G89.29];Benign paroxysmal vertigo, bilateral [H81.13]     Time Calculation  Start time: 1515  Stop time: 1619 Time Calculation (min): 64 minutes     Physical Therapy Occurrence Codes    Date of onset of impairment:  1/9/19   Date physical therapy care plan established or reviewed:  5/9/19   Date physical therapy treatment started:  5/9/19          Chief Complaint: Vertigo and Shoulder Problem    Visit Diagnoses     ICD-10-CM   1. BPPV (benign paroxysmal positional vertigo), bilateral H81.13   2. Chronic right shoulder pain M25.511    G89.29   3. DDD (degenerative disc disease), lumbar M51.36   4. DDD (degenerative disc disease), cervical M50.30         Subjective:   History of Present Illness:     Mechanism of injury:    Pt presents with a referral for dizziness as well as R shoulder, neck and LBP.  She reports her biggest complaint is the R shoulder pain that started after a FOOSH in Nov of 2017.  MRI indicating a full tear of the rotator cuff (see media) and a tear of the bicep tendon.  She was not a surgical candidate due to cardiovascular issues (has had a pacemaker since). Went through about 10 PT visits after the fall, which was beneficial, but the arm started hurting again in Feb 2019. Admits she has fallen out of compliance with the HEP.  She tired to restart the HEP, but is causing more pain.  Denies N/T.      Dizziness: happened suddenly in January 2019.  Bursts of true vertigo that was worse with lying back, rolling in bed, turning the head. Improved with dramamine and \"a sinus/allergy medicine\" but " continues every night at a lessened intensity.  Was causing nausea, no vomiting, but no longer.    Prior level of function:  Retired.   Headaches:  no headaches  Ear problems: none  Sleep disturbance:  Interrupted sleep  Pain:     Current pain ratin (at rest)    At best pain ratin    At worst pain ratin    Pain timing:  When active and all day    Relieving factors:  Heat application    Aggravating factors:  Carrying (reaching)    Progression:  Worsening  Social Support:     Lives with:  Spouse  Hand dominance:  Right  Diagnostic Tests:     MRI studies: abnormal    Treatments:     Previous treatment:  Physical therapy  Patient Goals:     Patient goals for therapy:  Decreased pain, increased motion, independence with ADLs/IADLs, increased strength, return to sport/leisure activities and improved balance    Other patient goals:  Resolve vertigo      Past Medical History:   Diagnosis Date   • Arthritis     osteoarthritis   • CATARACT    • CKD (chronic kidney disease) stage 3, GFR 30-59 ml/min ([facility])    • Complete heart block ([facility])     PMA placed on 18 at [facility] by Dr Scotty Arias   • COPD (chronic obstructive pulmonary disease) ([facility])    • Diverticulosis    • DM (diabetes mellitus) ([facility])    • Dyslipidemia    • Esophageal stricture     with dilation   • GERD (gastroesophageal reflux disease) 2009   • Glaucoma    • Hiatal hernia    • HTN (hypertension)    • Osteopenia    • Vitamin D deficiency      Past Surgical History:   Procedure Laterality Date   • TONSILLECTOMY AND ADENOIDECTOMY       Social History   Substance Use Topics   • Smoking status: Former Smoker     Packs/day: 2.00     Years: 56.00     Types: Cigarettes     Quit date: 1991   • Smokeless tobacco: Never Used   • Alcohol use 0.0 oz/week      Comment: rare     Family and Occupational History     Social History   • Marital status:      Spouse name: N/A   • Number of children: N/A   • Years of education: N/A  "      Objective     Observations     Additional Observation Details  Increased thoracic kyphosis, rounded shoulders    Postural Observations  Seated posture: fair  Standing posture: fair        Thoracic Screen    Thoracic range of motion within normal limits with the following exceptions: hypomobile    Neurological Testing     Sensation     Shoulder   Left Shoulder   Intact: light touch    Right Shoulder   Intact: light touch    Reflexes   Left   Biceps (C5/C6): normal (2+)  Brachioradialis (C6): normal (2+)    Right   Biceps (C5/C6): normal (2+)  Brachioradialis (C6): normal (2+)    Palpation   Left   Tenderness of the cervical paraspinals.     Right   Tenderness of the biceps, cervical paraspinals, infraspinatus, levator scapulae, middle trapezius, supraspinatus and thoracic paraspinals.     Tenderness     Right Shoulder  Tenderness in the AC joint, biceps tendon (proximal), bicipital groove, infraspinatus tendon, subacromial bursa and supraspinatus tendon.     Active Range of Motion   Left Shoulder   Normal active range of motion  Flexion: 145 degrees   Abduction: 145 degrees   External rotation BTH: T2   Internal rotation BTB: T6     Right Shoulder   Flexion: 112 degrees with pain  Abduction: 78 degrees with pain  External rotation BTH: C4 with pain  Internal rotation BTB: T12     Joint Play     Right Shoulder     Posterior capsule: hypomobile    Inferior capsule: hypomobile    Thoracic spine: hypomobile    1st rib: hypomobile    Strength:      Left Shoulder   Normal muscle strength    Right Shoulder   Planes of Motion   Flexion: 4   Extension: 4   Abduction: 3   Adduction: 4-   External rotation at 0°: 3   Internal rotation at 0°: 3+     Tests     Right Shoulder   Positive empty can, horn blower, lift-off, Neer's and Speed's.     General Comments     Shoulder Comments   Vestibular exam: oculomotor control WNL for pusuits, saccades, VORx1 and convergence.  POS R hallpike with 3\" latency and 18\" duration, R " torsional nystagmus.  Otherwise normal positional exam.         Therapeutic Treatments and Modalities:     1. Functional Training, Self Care (CPT 18597), Education: shoulder pathoanat, PT goals in compensating for RC tear, importance of posture and scap stab.  HEP with return ANUPAM cuellar provided: gifty ENCISO and brianne.  Also pathoanat for BPPV, goals of CRM, post tx instructions    2. Canalith Repositioning (CPT 55178), R epley completed x 2.     Time-based treatments/modalities:  Functional training, self care minutes (CPT 82543): 15 minutes       Assessment, Response and Plan:   Impairments: abnormal or restricted ROM, activity intolerance, difficulty performing job, impaired functional mobility, impaired physical strength, lacks appropriate home exercise program and limited ADL's    Assessment details:    Ms. Newell is an 84 y.o female who presents to PT with multiple complaints, choosing to focus on the recent onset of vertigo and worsened chronic R shoulder pain.  Pt tested positive for R posterior canalithasis type BPPV.  CRM was well tolerated and expect to be able to fully resolve this issue with skilled PT services.      The exam of the R shoulder is consistent with rotator cuff pathology as noted on MRI. Pt demonstrates decreased scapular stability, decreased postural awareness, decreased R shoulder AROM and PROM, decreased strength/balance in shoulder stabilizers.  Positive response to PT in the past. Return of dysfunction may be related to lack of long term compliance with her previously established HEP. Would benefit from a secondary episode of skilled PT services to return to PLOF and emphasize HEP compliance.   Prognosis: good    Goals:   Short Term Goals:   - Resolve nystagmus with R hallpike  - Able to indep scap set  - Able to perform L1 bilat ER x 10 reps without increased pain  Short term goal time span:  1-2 weeks      Long Term Goals:    - Improve DHI to less than 5%  - Able to reach to 120 deg  without deviation and less than 2/10 pain  - Able to sleep with no more than mild disruption by R shoulder  - Indep with HEP  Long term goal time span:  6-8 weeks    Plan:   Therapy options:  Physical therapy treatment to continue  Planned therapy interventions:  Functional Training, Self Care (CPT 04061), Canalith Repositioning (CPT 42076), E Stim Unattended (CPT 83907), Hot or Cold Pack Therapy (CPT 52771), Manual Therapy (CPT 56972), Neuromuscular Re-education (CPT 28549), Therapeutic Exercise (CPT 01853) and Therapeutic Activities (CPT 53792)  Frequency:  2x week  Duration in weeks:  8  Discussed with:  Patient      Functional Limitations and Severity Modifiers  Dizziness Handicap Inventory - Physical Items Score: 12  Dizziness Handicap Inventory - Emotional Items Score: 14  Dizziness Handicap Inventory - Functional Items Score: 6  Dizziness Handicap Inventory - Total Score: 32     Referring provider co-signature:  I have reviewed this plan of care and my co-signature certifies the need for services.  Certification Dates:   From 5/9/19     To 7/4/19    Physician Signature: ________________________________ Date: ______________

## 2019-05-14 ENCOUNTER — PHYSICAL THERAPY (OUTPATIENT)
Dept: PHYSICAL THERAPY | Facility: REHABILITATION | Age: 84
End: 2019-05-14
Attending: NURSE PRACTITIONER
Payer: MEDICARE

## 2019-05-14 DIAGNOSIS — M51.36 DDD (DEGENERATIVE DISC DISEASE), LUMBAR: ICD-10-CM

## 2019-05-14 DIAGNOSIS — G89.29 CHRONIC RIGHT SHOULDER PAIN: ICD-10-CM

## 2019-05-14 DIAGNOSIS — M50.30 DDD (DEGENERATIVE DISC DISEASE), CERVICAL: ICD-10-CM

## 2019-05-14 DIAGNOSIS — H81.13 BPPV (BENIGN PAROXYSMAL POSITIONAL VERTIGO), BILATERAL: ICD-10-CM

## 2019-05-14 DIAGNOSIS — M25.511 CHRONIC RIGHT SHOULDER PAIN: ICD-10-CM

## 2019-05-14 PROCEDURE — 97110 THERAPEUTIC EXERCISES: CPT

## 2019-05-14 PROCEDURE — 97140 MANUAL THERAPY 1/> REGIONS: CPT

## 2019-05-14 NOTE — OP THERAPY DAILY TREATMENT
Outpatient Physical Therapy  DAILY TREATMENT     [facility] Physical Therapy [address].  [address]  [place] [address]  Phone:  [phone]  Fax:  [phone]    Date: 05/14/2019    Patient: Kerri Newell  YOB: 1935  MRN: [id number]     Time Calculation  Start time: 1130  Stop time: 1157 Time Calculation (min): 27 minutes     Chief Complaint: No chief complaint on file.    Visit #: 2    SUBJECTIVE:  Hasn't been able to do HEP much, had stomach flu over the weekend.  Still having some vertigo, much improved.  Lightheaded today, unsure why.      OBJECTIVE:  Current objective measures: NEG positional exam in hallpike, roll and hang.           Therapeutic Exercises (CPT 32407):     1. Supine dowel:    2. Bench press, x 20    3. B GHJ flex, x 10    4. Protraction/retractions, x 20    15. PACEMAKER    Therapeutic Treatments and Modalities:     1. Manual Therapy (CPT 11732), DTW R UT/LS/subocc, R R1 mobs GIII, c/s mod manual txn x 3 min, R GHJ PROM in all planes with respective mobs GII.     Time-based treatments/modalities:  Manual therapy minutes (CPT 35005): 15 minutes  Therapeutic exercise minutes (CPT 67814): 13 minutes       ASSESSMENT:   Response to treatment: BPPV is resolved, ok to return to typical movement/sleep patterns.  Intro of tx for the R shoulder well tolerated.  Better control of elevation when performed in supine- will modify from standing for home.     PLAN/RECOMMENDATIONS:   Plan for treatment: therapy treatment to continue next visit.  Planned interventions for next visit: continue with current treatment. Focus on R shoulder ROM, scap and RC stab.

## 2019-05-16 NOTE — OP THERAPY DAILY TREATMENT
Outpatient Physical Therapy  DAILY TREATMENT     [facility] Physical Therapy [address].  [address]  [place] [address]  Phone:  [phone]  Fax:  [phone]    Date: 05/17/2019    Patient: Kerri Newell  YOB: 1935  MRN: [id number]     Time Calculation  Start time: 1055  Stop time: 1130 Time Calculation (min): 35 minutes     Chief Complaint: Shoulder Problem    Visit #: 3    SUBJECTIVE:  The lightheadedness is improving every day.  Manual therapy to the neck helped a lot last time.  Would like to do more of that today    OBJECTIVE:      Therapeutic Exercises (CPT 70653):     1. UBE, 3 min alt    2. Pulleys, x 2 min    3. Supine dowel B GHJ flex, x 20    4. Supine flasher, L0 x 20    5. TB row, L1 2x10    6. Ball roll on table, FF to 90 deg, ER (small range), x 20 ea way    15. PACEMAKER    Therapeutic Treatments and Modalities:     1. Manual Therapy (CPT 08883), DTW R UT/LS/subocc, R R1 mobs GIII, c/s mod manual txn x 3 min, R GHJ PROM in all planes with respective mobs GII.     Time-based treatments/modalities:  Manual therapy minutes (CPT 95433): 10 minutes  Therapeutic exercise minutes (CPT 27783): 20 minutes       ASSESSMENT:   Response to treatment: Receptive to cuing to correct scap stab. Able to elevate to 120 deg with pulleys- did recommend for home if the dowel is too painful.     PLAN/RECOMMENDATIONS:   Plan for treatment: therapy treatment to continue next visit.  Planned interventions for next visit: continue with current treatment. Cont RC strengthening, 1/2 roller if able.

## 2019-05-17 ENCOUNTER — PHYSICAL THERAPY (OUTPATIENT)
Dept: PHYSICAL THERAPY | Facility: REHABILITATION | Age: 84
End: 2019-05-17
Attending: NURSE PRACTITIONER
Payer: MEDICARE

## 2019-05-17 DIAGNOSIS — M50.30 DDD (DEGENERATIVE DISC DISEASE), CERVICAL: ICD-10-CM

## 2019-05-17 DIAGNOSIS — M25.511 CHRONIC RIGHT SHOULDER PAIN: ICD-10-CM

## 2019-05-17 DIAGNOSIS — M51.36 DDD (DEGENERATIVE DISC DISEASE), LUMBAR: ICD-10-CM

## 2019-05-17 DIAGNOSIS — G89.29 CHRONIC RIGHT SHOULDER PAIN: ICD-10-CM

## 2019-05-17 DIAGNOSIS — H81.13 BPPV (BENIGN PAROXYSMAL POSITIONAL VERTIGO), BILATERAL: ICD-10-CM

## 2019-05-17 PROCEDURE — 97140 MANUAL THERAPY 1/> REGIONS: CPT

## 2019-05-17 PROCEDURE — 97110 THERAPEUTIC EXERCISES: CPT

## 2019-05-21 ENCOUNTER — PHYSICAL THERAPY (OUTPATIENT)
Dept: PHYSICAL THERAPY | Facility: REHABILITATION | Age: 84
End: 2019-05-21
Attending: NURSE PRACTITIONER
Payer: MEDICARE

## 2019-05-21 DIAGNOSIS — H81.13 BPPV (BENIGN PAROXYSMAL POSITIONAL VERTIGO), BILATERAL: ICD-10-CM

## 2019-05-21 PROCEDURE — 97110 THERAPEUTIC EXERCISES: CPT

## 2019-05-21 PROCEDURE — 97140 MANUAL THERAPY 1/> REGIONS: CPT

## 2019-05-21 NOTE — OP THERAPY DAILY TREATMENT
Outpatient Physical Therapy  DAILY TREATMENT     [facility] Physical Therapy [address].  [address]  [place] [address]  Phone:  [phone]  Fax:  [phone]    Date: 05/21/2019    Patient: Kerri Newell  YOB: 1935  MRN: [id number]     Time Calculation  Start time: 1130  Stop time: 1201 Time Calculation (min): 31 minutes     Chief Complaint: Shoulder Problem    Visit #: 4    SUBJECTIVE:  Increased pain that started about 30 min after LV and lasted the next 2 days.  Believes it was the 'flasher' exercise.     OBJECTIVE:      Therapeutic Exercises (CPT 59527):     1. UBE, 3 min alt    2. Pulleys, x 2 min    3. Supine dowel B GHJ flex, x 20    4. SL ER, 0# x 10, small range    5. TB row, L1 2x10    6. Ball roll on table, FF to 90 deg, ER (small range), x 20 ea way    15. PACEMAKER    Therapeutic Treatments and Modalities:     1. Manual Therapy (CPT 32696), DTW R UT/LS/subocc, R R1 mobs GIII, c/s mod manual txn x 3 min, R GHJ PROM in all planes with respective mobs GII.     Time-based treatments/modalities:  Manual therapy minutes (CPT 44541): 10 minutes  Therapeutic exercise minutes (CPT 11384): 20 minutes       ASSESSMENT:   Response to treatment: Modified TB flasher to SL ER and showed improved tolerance. Cont notable dysfunction in the RC, bicep and scap stabilizers.     PLAN/RECOMMENDATIONS:   Plan for treatment: therapy treatment to continue next visit.  Planned interventions for next visit: continue with current treatment. Progress posterior cuff strengthening if able, perhaps through manual iso holds.

## 2019-05-24 ENCOUNTER — PHYSICAL THERAPY (OUTPATIENT)
Dept: PHYSICAL THERAPY | Facility: REHABILITATION | Age: 84
End: 2019-05-24
Attending: NURSE PRACTITIONER
Payer: MEDICARE

## 2019-05-24 DIAGNOSIS — M25.511 CHRONIC RIGHT SHOULDER PAIN: ICD-10-CM

## 2019-05-24 DIAGNOSIS — H81.13 BPPV (BENIGN PAROXYSMAL POSITIONAL VERTIGO), BILATERAL: ICD-10-CM

## 2019-05-24 DIAGNOSIS — G89.29 CHRONIC RIGHT SHOULDER PAIN: ICD-10-CM

## 2019-05-24 DIAGNOSIS — M50.30 DDD (DEGENERATIVE DISC DISEASE), CERVICAL: ICD-10-CM

## 2019-05-24 DIAGNOSIS — M51.36 DDD (DEGENERATIVE DISC DISEASE), LUMBAR: ICD-10-CM

## 2019-05-24 PROCEDURE — 97140 MANUAL THERAPY 1/> REGIONS: CPT

## 2019-05-24 PROCEDURE — 97110 THERAPEUTIC EXERCISES: CPT

## 2019-05-24 NOTE — OP THERAPY DAILY TREATMENT
Outpatient Physical Therapy  DAILY TREATMENT     [facility] Physical [address].  [address]  [place] [address]  Phone:  [phone]  Fax:  [phone]    Date: 05/24/2019    Patient: Kerri Newell  YOB: 1935  MRN: [id number]     Time Calculation  Start time: 1100  Stop time: 1135 Time Calculation (min): 35 minutes     Chief Complaint: Shoulder Problem    Visit #: 5    SUBJECTIVE:  I think it's getting better.  I feel stronger with my housework.      OBJECTIVE:      Therapeutic Exercises (CPT 09244):     1. UBE, 3 min alt    2. Pulleys, x 2 min    3. Supine dowel B GHJ flex, x 20    4. SL ER, 0# x 10, small range    5. TB row, L1 2x10    6. TB pullback, L1 x 10    15. PACEMAKER      Therapeutic Exercise Summary:     HEP: Access Code: [email or web address]   URL: [email or web address]   Date: 05/24/2019   Prepared by: Marilu Dumont      Exercises  · Supine Shoulder Flexion with Dowel  · Standing Shoulder Extension AAROM with Dowel   · Standing Shoulder Row with Anchored Resistance   · Shoulder Extension with Resistance  · Sidelying Shoulder External Rotation   · Isometric Shoulder External Rotation at Wall     Therapeutic Treatments and Modalities:     1. Manual Therapy (CPT 19120), DTW R UT/LS/subocc, R R1 mobs GIII, c/s mod manual txn x 3 min, R GHJ PROM in all planes with respective mobs GII. Iso resisted ER to side, at 90 flex and at 90 abd    Time-based treatments/modalities:  Manual therapy minutes (CPT 12472): 10 minutes  Therapeutic exercise minutes (CPT 72315): 22 minutes       ASSESSMENT:   Response to treatment: Improved tolerance to AA flexion, equal to 140 deg flexion when in supine with little pain.  Still load intolerant to ER, but SL position or isometrics able to be completed without issue.  HO provided for HEP modifications. Would benefit from cont PT services.     PLAN/RECOMMENDATIONS:   Plan for treatment: therapy treatment to continue next  visit.  Planned interventions for next visit: continue with current treatment.

## 2019-05-28 ENCOUNTER — PHYSICAL THERAPY (OUTPATIENT)
Dept: PHYSICAL THERAPY | Facility: REHABILITATION | Age: 84
End: 2019-05-28
Attending: NURSE PRACTITIONER
Payer: MEDICARE

## 2019-05-28 DIAGNOSIS — H81.13 BPPV (BENIGN PAROXYSMAL POSITIONAL VERTIGO), BILATERAL: ICD-10-CM

## 2019-05-28 DIAGNOSIS — M51.36 DDD (DEGENERATIVE DISC DISEASE), LUMBAR: ICD-10-CM

## 2019-05-28 DIAGNOSIS — M50.30 DDD (DEGENERATIVE DISC DISEASE), CERVICAL: ICD-10-CM

## 2019-05-28 DIAGNOSIS — G89.29 CHRONIC RIGHT SHOULDER PAIN: ICD-10-CM

## 2019-05-28 DIAGNOSIS — M25.511 CHRONIC RIGHT SHOULDER PAIN: ICD-10-CM

## 2019-05-28 PROCEDURE — 97140 MANUAL THERAPY 1/> REGIONS: CPT | Mod: XU

## 2019-05-28 PROCEDURE — 97110 THERAPEUTIC EXERCISES: CPT

## 2019-05-28 PROCEDURE — 97012 MECHANICAL TRACTION THERAPY: CPT

## 2019-05-28 NOTE — OP THERAPY DAILY TREATMENT
"  Outpatient Physical Therapy  DAILY TREATMENT     [facility] Physical Therapy [address].  [address]  [place] [address]  Phone:  [phone]  Fax:  [phone]    Date: 05/28/2019    Patient: Kerri Newell  YOB: 1935  MRN: [id number]     Time Calculation  Start time: 1129  Stop time: 1216 Time Calculation (min): 47 minutes     Chief Complaint: Shoulder Problem    Visit #: 6    SUBJECTIVE:  Poor sleep last night due to OA pain throughout the body.      OBJECTIVE:      Therapeutic Exercises (CPT 15795):     1. UBE, 3 min alt    2. Pulleys, x 2 min    3. Supine bicep curl- focus on eccentric, 2# x 15    4. SL ER, 0# x 10, small range    5. Prone \"I\" and \"T\", x 10 ea    15. PACEMAKER    Therapeutic Treatments and Modalities:     1. Manual Therapy (CPT 75222), DTW R UT/LS/subocc, R R1 mobs GIII, c/s mod manual txn x 3 min, R GHJ PROM in all planes with respective mobs GII. Iso resisted ER to side, at 90 flex and at 90 abd.    2. Mechanical Traction (CPT 93710), 15/8#, 60/20 with MH x 15 min    Time-based treatments/modalities:  Manual therapy minutes (CPT 72391): 15 minutes  Therapeutic exercise minutes (CPT 64388): 15 minutes       ASSESSMENT:   Response to treatment: AROM: FF= 125, abd 105 deg.  Painful still EOR, but improving scapulohumeral rhythm.  Prone position well tolerated.  Trial of c/s [facility] txn today- monitor response.     PLAN/RECOMMENDATIONS:   Plan for treatment: therapy treatment to continue next visit.  Planned interventions for next visit: continue with current treatment.      "

## 2019-05-31 ENCOUNTER — PHYSICAL THERAPY (OUTPATIENT)
Dept: PHYSICAL THERAPY | Facility: REHABILITATION | Age: 84
End: 2019-05-31
Attending: NURSE PRACTITIONER
Payer: MEDICARE

## 2019-05-31 DIAGNOSIS — H81.13 BPPV (BENIGN PAROXYSMAL POSITIONAL VERTIGO), BILATERAL: ICD-10-CM

## 2019-05-31 DIAGNOSIS — G89.29 CHRONIC RIGHT SHOULDER PAIN: ICD-10-CM

## 2019-05-31 DIAGNOSIS — M25.511 CHRONIC RIGHT SHOULDER PAIN: ICD-10-CM

## 2019-05-31 PROCEDURE — 97012 MECHANICAL TRACTION THERAPY: CPT

## 2019-05-31 PROCEDURE — 97110 THERAPEUTIC EXERCISES: CPT

## 2019-05-31 PROCEDURE — 97140 MANUAL THERAPY 1/> REGIONS: CPT | Mod: XU

## 2019-05-31 NOTE — OP THERAPY DAILY TREATMENT
"  Outpatient Physical Therapy  DAILY TREATMENT     [facility] Physical Therapy [address].  [address]  [place] [address]  Phone:  [phone]  Fax:  [phone]    Date: 05/31/2019    Patient: Kerri Newell  YOB: 1935  MRN: [id number]     Time Calculation  Start time: 1100  Stop time: 1147 Time Calculation (min): 47 minutes     Chief Complaint: Shoulder Problem    Visit #: 7    SUBJECTIVE:  Achy for the 1-2 days after PT, but seems to be worth the improvements in ROM and strength afterward.  Would like to try the txn again without MH and only 10 min due to claustrophobia.     OBJECTIVE:      Therapeutic Exercises (CPT 12957):     1. UBE, 4 min alt    2. Pulleys, x 2 min    3. Supine bicep curl- focus on eccentric, manually resisted x 15    4. Prone \"I\" and \"T\", x 10 ea    5. TB row, L2 x 15    6. TB pullback, L2 x 15    15. PACEMAKER    Therapeutic Treatments and Modalities:     1. Manual Therapy (CPT 69234), DTW R UT/LS/subocc, R R1 mobs GIII, c/s mod manual txn x 3 min, R GHJ PROM in all planes with respective mobs GII. Iso resisted ER/IR to side, at 90 flex and at 90 abd.    2. Mechanical Traction (CPT 62527), c/s traction 17/10#, 60/20\" x 10 min    Time-based treatments/modalities:  Manual therapy minutes (CPT 01782): 10 minutes  Therapeutic exercise minutes (CPT 60418): 20 minutes       ASSESSMENT:   Response to treatment: More painful to IR loading today compared ER.  Shows full elevation actively compared to the L, so good progress overall, but strength and endurance is limited and leaves her prone to re-injury.     PLAN/RECOMMENDATIONS:   Plan for treatment: therapy treatment to continue next visit.  Planned interventions for next visit: continue with current treatment. Cont to strengthening the RC to tolerance.       "

## 2019-06-05 ENCOUNTER — PHYSICAL THERAPY (OUTPATIENT)
Dept: PHYSICAL THERAPY | Facility: REHABILITATION | Age: 84
End: 2019-06-05
Attending: NURSE PRACTITIONER
Payer: MEDICARE

## 2019-06-05 DIAGNOSIS — G89.29 CHRONIC RIGHT SHOULDER PAIN: ICD-10-CM

## 2019-06-05 DIAGNOSIS — M51.36 DDD (DEGENERATIVE DISC DISEASE), LUMBAR: ICD-10-CM

## 2019-06-05 DIAGNOSIS — H81.13 BPPV (BENIGN PAROXYSMAL POSITIONAL VERTIGO), BILATERAL: ICD-10-CM

## 2019-06-05 DIAGNOSIS — M50.30 DDD (DEGENERATIVE DISC DISEASE), CERVICAL: ICD-10-CM

## 2019-06-05 DIAGNOSIS — M25.511 CHRONIC RIGHT SHOULDER PAIN: ICD-10-CM

## 2019-06-05 PROCEDURE — 97110 THERAPEUTIC EXERCISES: CPT

## 2019-06-05 PROCEDURE — 97140 MANUAL THERAPY 1/> REGIONS: CPT

## 2019-06-05 NOTE — OP THERAPY DAILY TREATMENT
"  Outpatient Physical Therapy  DAILY TREATMENT     [facility] Physical Therapy [address].  [address]  [place] [address]  Phone:  [phone]  Fax:  [phone]    Date: 06/05/2019    Patient: Kerri Newell  YOB: 1935  MRN: [id number]     Time Calculation  Start time: 1124  Stop time: 1223 Time Calculation (min): 59 minutes     Chief Complaint: Shoulder Problem    Visit #: 8    SUBJECTIVE:  The shoulder was not as painful follow last visit. 0/10 at rest at presentation.  R C[facility] jt is achy today.     OBJECTIVE:       Therapeutic Exercises (CPT 55924):     1. UBE, 4 min alt    2. Pulleys, x 2 min    3. Seated eccentric bicep curl, 2# x 15 reps    4. Prone \"I\" and \"T\", x 10 ea    5. SL row, 0# x 15    6. TB pullback, L2 x 15    15. PACEMAKER    Therapeutic Treatments and Modalities:     1. Manual Therapy (CPT 42667), DTW R UT/LS/subocc, R R1 mobs GIII, c/s mod manual txn x 3 min, R GHJ PROM in all planes with respective mobs GII. Iso resisted ER/IR to side, at 90 flex and at 90 abd.    Time-based treatments/modalities:  Manual therapy minutes (CPT 64543): 15 minutes  Therapeutic exercise minutes (CPT 67538): 20 minutes         ASSESSMENT:   Response to treatment: Able to ER through nearly full range in SL without resistance now, but limited rep tolerance.  To continue working on gaining tolerance for reps prior to adding load.  Steady gains in overhead mobility.     PLAN/RECOMMENDATIONS:   Plan for treatment: therapy treatment to continue next visit.  Planned interventions for next visit: continue with current treatment.      "

## 2019-06-10 NOTE — OP THERAPY DAILY TREATMENT
"  Outpatient Physical Therapy  DAILY TREATMENT     [facility] Physical Therapy [address].  [address]  [place] [address]  Phone:  [phone]  Fax:  [phone]    Date: 06/11/2019    Patient: Kerri Newell  YOB: 1935  MRN: [id number]     Time Calculation  Start time: 1018  Stop time: 1114 Time Calculation (min): 56 minutes     Chief Complaint: Neck Problem and Shoulder Problem    Visit #: 9    SUBJECTIVE:  I overdid it a little in the garden yesterday.  Was able to prune trees.     OBJECTIVE:      Therapeutic Exercises (CPT 68746):     1. UBE, 4 min alt    2. Pulleys, x 2 min    3. Seated eccentric bicep curl, 1# x 15 reps    4. Prone \"I\" and \"T\", x 10 ea    5. SL ER, 0# x 15    6. TB pullback, L2 x 15    7. TB row, L2 x 15    15. PACEMAKER    Therapeutic Treatments and Modalities:     1. Manual Therapy (CPT 42032), DTW R UT/LS/subocc, R R1 mobs GIII, c/s mod manual txn x 3 min, R GHJ PROM in all planes with respective mobs GII. Iso resisted ER/IR to side, at 90 flex and at 90 abd.    MH to R shoulder and neck x 10 min.     Time-based treatments/modalities:  Manual therapy minutes (CPT 25472): 15 minutes  Therapeutic exercise minutes (CPT 08949): 25 minutes       ASSESSMENT:   Response to treatment: Passively R shoulder elevation= 145, ER= 80 deg at 90 abd.  Steady gains in AROM and improving function.  Having to decrease load slightly due to flare up, but showing good indep HEP selection and nearing d/c.     PLAN/RECOMMENDATIONS:   Plan for treatment: therapy treatment to continue next visit.  Planned interventions for next visit: continue with current treatment. Decrease to 1x/week.       "

## 2019-06-11 ENCOUNTER — PHYSICAL THERAPY (OUTPATIENT)
Dept: PHYSICAL THERAPY | Facility: REHABILITATION | Age: 84
End: 2019-06-11
Attending: NURSE PRACTITIONER
Payer: MEDICARE

## 2019-06-11 DIAGNOSIS — M51.36 DDD (DEGENERATIVE DISC DISEASE), LUMBAR: ICD-10-CM

## 2019-06-11 DIAGNOSIS — M25.511 CHRONIC RIGHT SHOULDER PAIN: ICD-10-CM

## 2019-06-11 DIAGNOSIS — H81.13 BPPV (BENIGN PAROXYSMAL POSITIONAL VERTIGO), BILATERAL: ICD-10-CM

## 2019-06-11 DIAGNOSIS — G89.29 CHRONIC RIGHT SHOULDER PAIN: ICD-10-CM

## 2019-06-11 DIAGNOSIS — M50.30 DDD (DEGENERATIVE DISC DISEASE), CERVICAL: ICD-10-CM

## 2019-06-11 PROCEDURE — 97140 MANUAL THERAPY 1/> REGIONS: CPT

## 2019-06-11 PROCEDURE — 97110 THERAPEUTIC EXERCISES: CPT

## 2019-06-18 ENCOUNTER — PHYSICAL THERAPY (OUTPATIENT)
Dept: PHYSICAL THERAPY | Facility: REHABILITATION | Age: 84
End: 2019-06-18
Attending: NURSE PRACTITIONER
Payer: MEDICARE

## 2019-06-18 ENCOUNTER — HOSPITAL ENCOUNTER (OUTPATIENT)
Dept: RADIOLOGY | Facility: MEDICAL CENTER | Age: 84
End: 2019-06-18
Attending: NURSE PRACTITIONER
Payer: MEDICARE

## 2019-06-18 DIAGNOSIS — M51.36 DDD (DEGENERATIVE DISC DISEASE), LUMBAR: ICD-10-CM

## 2019-06-18 DIAGNOSIS — H81.13 BPPV (BENIGN PAROXYSMAL POSITIONAL VERTIGO), BILATERAL: ICD-10-CM

## 2019-06-18 DIAGNOSIS — N95.9 POST MENOPAUSAL PROBLEMS: ICD-10-CM

## 2019-06-18 DIAGNOSIS — M25.511 CHRONIC RIGHT SHOULDER PAIN: ICD-10-CM

## 2019-06-18 DIAGNOSIS — G89.29 CHRONIC RIGHT SHOULDER PAIN: ICD-10-CM

## 2019-06-18 DIAGNOSIS — M50.30 DDD (DEGENERATIVE DISC DISEASE), CERVICAL: ICD-10-CM

## 2019-06-18 PROCEDURE — 97140 MANUAL THERAPY 1/> REGIONS: CPT

## 2019-06-18 PROCEDURE — 77080 DXA BONE DENSITY AXIAL: CPT

## 2019-06-18 PROCEDURE — 97110 THERAPEUTIC EXERCISES: CPT

## 2019-06-18 NOTE — OP THERAPY DAILY TREATMENT
"  Outpatient Physical Therapy  DAILY TREATMENT     [facility] Physical [address].  [address]  [place] [address]  Phone:  [phone]  Fax:  [phone]    Date: 06/18/2019    Patient: Kerri Newell  YOB: 1935  MRN: [id number]     Time Calculation  Start time: 1002  Stop time: 1055 Time Calculation (min): 53 minutes     Chief Complaint: Shoulder Problem    Visit #: 10    SUBJECTIVE:  SOB is more pronounced today.  Denies chest pressure or pain.  Shoulder is more functional, but still aches.     OBJECTIVE:  Current objective measures: At presentation AROM ff= 135, abd= 150, HBH= T1, HBB= T12          Therapeutic Exercises (CPT 93406):     1. UBE, 4 min alt    2. Dowel OH stretch, x 15    3. Seated eccentric bicep curl, 1# x 15 reps    4. Prone \"I\" and \"T\", x 10 ea    5. SL ER, 0# x 15    15. PACEMAKER    Therapeutic Treatments and Modalities:     1. Manual Therapy (CPT 79789), DTW R UT/LS/subocc, R R1 mobs GIII, c/s mod manual txn x 3 min, R GHJ PROM in all planes with respective mobs GII. Iso resisted ER/IR to side, at 90 flex and at 90 abd.    Time-based treatments/modalities:  Manual therapy minutes (CPT 86988): 25 minutes  Therapeutic exercise minutes (CPT 70327): 15 minutes       ASSESSMENT:   Response to treatment: Shoulder AROM continues to improve.  Pain continues, but likely related to her need for further RC strengthening.  Showing good indep with HEP and may be able to d/c to HEP with progressions next visit.     PLAN/RECOMMENDATIONS:   Plan for treatment: therapy treatment to continue next visit.  Planned interventions for next visit: continue with current treatment.      "

## 2019-06-21 ENCOUNTER — APPOINTMENT (OUTPATIENT)
Dept: PHYSICAL THERAPY | Facility: REHABILITATION | Age: 84
End: 2019-06-21
Attending: NURSE PRACTITIONER
Payer: MEDICARE

## 2019-06-24 ENCOUNTER — PHYSICAL THERAPY (OUTPATIENT)
Dept: PHYSICAL THERAPY | Facility: REHABILITATION | Age: 84
End: 2019-06-24
Attending: NURSE PRACTITIONER
Payer: MEDICARE

## 2019-06-24 DIAGNOSIS — G89.29 CHRONIC RIGHT SHOULDER PAIN: ICD-10-CM

## 2019-06-24 DIAGNOSIS — M25.511 CHRONIC RIGHT SHOULDER PAIN: ICD-10-CM

## 2019-06-24 DIAGNOSIS — M51.36 DDD (DEGENERATIVE DISC DISEASE), LUMBAR: ICD-10-CM

## 2019-06-24 DIAGNOSIS — M50.30 DDD (DEGENERATIVE DISC DISEASE), CERVICAL: ICD-10-CM

## 2019-06-24 DIAGNOSIS — H81.13 BPPV (BENIGN PAROXYSMAL POSITIONAL VERTIGO), BILATERAL: ICD-10-CM

## 2019-06-24 PROCEDURE — 97110 THERAPEUTIC EXERCISES: CPT

## 2019-06-24 PROCEDURE — 97140 MANUAL THERAPY 1/> REGIONS: CPT

## 2019-06-24 NOTE — OP THERAPY DAILY TREATMENT
"  Outpatient Physical Therapy  DAILY TREATMENT     [facility] Physical [address].  [address]  [place] [address]  Phone:  [phone]  Fax:  [phone]    Date: 06/24/2019    Patient: Kerri Newell  YOB: 1935  MRN: [id number]     Time Calculation  Start time: 1000  Stop time: 1031 Time Calculation (min): 31 minutes     Chief Complaint: Shoulder Problem    Visit #: 11    SUBJECTIVE:  \"I'm doing well today.\"    OBJECTIVE:  Current objective measures:   AROM: FF= 150 deg, Abd= 160 deg, HBH= T1, HBB= T11  Quickdash General Total Score: 25       Therapeutic Exercises (CPT 64539):     1. UBE, 4 min alt    2. Dowel OH stretch, x 15    3. Seated eccentric bicep curl, 1# x 15 reps    4. Prone \"I\" and \"T\", x 10 ea    5. SL ER, 0# x 15    15. PACEMAKER    Therapeutic Treatments and Modalities:     1. Manual Therapy (CPT 75285), DTW R UT/LS/subocc, R R1 mobs GIII, c/s mod manual txn x 3 min, R GHJ PROM in all planes with respective mobs GII. Iso resisted ER/IR to side, at 90 flex and at 90 abd.    Time-based treatments/modalities:  Manual therapy minutes (CPT 91735): 15 minutes  Therapeutic exercise minutes (CPT 08983): 15 minutes       ASSESSMENT:   Response to treatment: See d/c note.     PLAN/RECOMMENDATIONS:   Plan for treatment: no further treatment needed.        "

## 2019-06-24 NOTE — OP THERAPY DISCHARGE SUMMARY
Outpatient Physical Therapy  DISCHARGE SUMMARY NOTE      [facility] Physical Therapy [address].  [address]  [place] [address]  Phone:  [phone]  Fax:  [phone]    Date of Visit: 06/24/2019    Patient: Kerri Newell  YOB: 1935  MRN: [id number]     Referring Provider: ALISON Santacruz  [address]  [address]  [place] [address]   Referring Diagnosis Pain in right shoulder [M25.511];Other chronic pain [G89.29];Benign paroxysmal vertigo, bilateral [H81.13]     Physical Therapy Occurrence Codes    Date of onset of impairment:  1/9/19   Date physical therapy care plan established or reviewed:  5/9/19   Date physical therapy treatment started:  5/9/19          Functional Limitations and Severity Modifiers  Quickdash General Total Score: 25     Your patient is being discharged from Physical Therapy with the following comments:   · Goals met    Comments:  Ms. Newell was seen for 11 PT sessions treating her vertigo and R shoulder pain.   The BPPV resolved quickly with CRM and transitioned focus to manual therapy, therex and modalities for the R shoulder RCT.  Pt has progressed well, with reported improvement of 80%.  She feels she is at her previous baseline and has met all goals at this time.  Her ROM is WNL and strength MMTs 4/5 in all directions except IR 3+/5 with mild pain.  She is indep with HEP to continue gaining strength and stability in the RC.  Emphasized importance of long term performance.      Recommendations:  D/C to HEP.  May return with new Rx if indicated. Thank you for the referral!    Marilu Dumont, PT, DPT    Date: 6/24/2019

## 2019-09-03 ENCOUNTER — HOSPITAL ENCOUNTER (OUTPATIENT)
Dept: LAB | Facility: MEDICAL CENTER | Age: 84
End: 2019-09-03
Attending: NURSE PRACTITIONER
Payer: MEDICARE

## 2019-09-03 DIAGNOSIS — E11.51 DM (DIABETES MELLITUS) TYPE II, CONTROLLED, WITH PERIPHERAL VASCULAR DISORDER (HCC): ICD-10-CM

## 2019-09-03 DIAGNOSIS — E78.5 HYPERLIPIDEMIA LDL GOAL <100: ICD-10-CM

## 2019-09-03 LAB
CHOLEST SERPL-MCNC: 184 MG/DL (ref 100–199)
FASTING STATUS PATIENT QL REPORTED: NORMAL
HDLC SERPL-MCNC: 41 MG/DL
LDLC SERPL CALC-MCNC: 88 MG/DL
TRIGL SERPL-MCNC: 275 MG/DL (ref 0–149)

## 2019-09-03 PROCEDURE — 80061 LIPID PANEL: CPT

## 2019-09-03 PROCEDURE — 36415 COLL VENOUS BLD VENIPUNCTURE: CPT

## 2019-09-03 PROCEDURE — 83036 HEMOGLOBIN GLYCOSYLATED A1C: CPT | Mod: GA

## 2019-09-04 LAB
EST. AVERAGE GLUCOSE BLD GHB EST-MCNC: 143 MG/DL
HBA1C MFR BLD: 6.6 % (ref 0–5.6)

## 2019-09-12 ENCOUNTER — OFFICE VISIT (OUTPATIENT)
Dept: MEDICAL GROUP | Facility: MEDICAL CENTER | Age: 84
End: 2019-09-12
Payer: MEDICARE

## 2019-09-12 VITALS
DIASTOLIC BLOOD PRESSURE: 74 MMHG | HEART RATE: 64 BPM | HEIGHT: 67 IN | SYSTOLIC BLOOD PRESSURE: 128 MMHG | OXYGEN SATURATION: 91 % | TEMPERATURE: 97.1 F | BODY MASS INDEX: 27.94 KG/M2 | WEIGHT: 178 LBS

## 2019-09-12 DIAGNOSIS — E78.5 HYPERLIPIDEMIA LDL GOAL <100: ICD-10-CM

## 2019-09-12 DIAGNOSIS — R07.9 CHEST PAIN, UNSPECIFIED TYPE: ICD-10-CM

## 2019-09-12 DIAGNOSIS — Z23 NEED FOR INFLUENZA VACCINATION: ICD-10-CM

## 2019-09-12 DIAGNOSIS — I10 ESSENTIAL HYPERTENSION, BENIGN: ICD-10-CM

## 2019-09-12 DIAGNOSIS — Z23 NEED FOR PNEUMOCOCCAL VACCINATION: ICD-10-CM

## 2019-09-12 DIAGNOSIS — E55.9 VITAMIN D DEFICIENCY: ICD-10-CM

## 2019-09-12 DIAGNOSIS — J43.8 OTHER EMPHYSEMA (HCC): ICD-10-CM

## 2019-09-12 DIAGNOSIS — E11.51 DM (DIABETES MELLITUS) TYPE II, CONTROLLED, WITH PERIPHERAL VASCULAR DISORDER (HCC): ICD-10-CM

## 2019-09-12 DIAGNOSIS — K21.9 GASTROESOPHAGEAL REFLUX DISEASE WITHOUT ESOPHAGITIS: ICD-10-CM

## 2019-09-12 PROCEDURE — 90662 IIV NO PRSV INCREASED AG IM: CPT | Performed by: NURSE PRACTITIONER

## 2019-09-12 PROCEDURE — 99214 OFFICE O/P EST MOD 30 MIN: CPT | Mod: 25 | Performed by: NURSE PRACTITIONER

## 2019-09-12 PROCEDURE — G0009 ADMIN PNEUMOCOCCAL VACCINE: HCPCS | Performed by: NURSE PRACTITIONER

## 2019-09-12 PROCEDURE — 90732 PPSV23 VACC 2 YRS+ SUBQ/IM: CPT | Performed by: NURSE PRACTITIONER

## 2019-09-12 PROCEDURE — G0008 ADMIN INFLUENZA VIRUS VAC: HCPCS | Performed by: NURSE PRACTITIONER

## 2019-09-12 RX ORDER — RABEPRAZOLE SODIUM 20 MG/1
20 TABLET, DELAYED RELEASE ORAL DAILY
Qty: 90 TAB | Refills: 1 | Status: SHIPPED | OUTPATIENT
Start: 2019-09-12 | End: 2020-02-25 | Stop reason: SDUPTHER

## 2019-09-12 RX ORDER — AMOXICILLIN 500 MG/1
CAPSULE ORAL
Qty: 4 CAP | Refills: 1 | Status: SHIPPED | OUTPATIENT
Start: 2019-09-12 | End: 2020-02-25

## 2019-09-12 RX ORDER — ALBUTEROL SULFATE 90 UG/1
2 AEROSOL, METERED RESPIRATORY (INHALATION) EVERY 6 HOURS PRN
Qty: 3 INHALER | Refills: 1 | Status: SHIPPED | OUTPATIENT
Start: 2019-09-12 | End: 2020-02-25 | Stop reason: SDUPTHER

## 2019-09-12 RX ORDER — FELODIPINE 10 MG/1
10 TABLET, EXTENDED RELEASE ORAL
Qty: 90 TAB | Refills: 1 | Status: SHIPPED | OUTPATIENT
Start: 2019-09-12 | End: 2020-02-25 | Stop reason: SDUPTHER

## 2019-09-12 RX ORDER — FLUTICASONE PROPIONATE 50 MCG
2 SPRAY, SUSPENSION (ML) NASAL DAILY
Qty: 3 BOTTLE | Refills: 1 | Status: SHIPPED | OUTPATIENT
Start: 2019-09-12 | End: 2020-02-25 | Stop reason: SDUPTHER

## 2019-09-12 RX ORDER — FELODIPINE 2.5 MG/1
2.5 TABLET, EXTENDED RELEASE ORAL
Qty: 90 TAB | Refills: 3 | Status: SHIPPED | OUTPATIENT
Start: 2019-09-12 | End: 2020-02-25 | Stop reason: SDUPTHER

## 2019-09-12 RX ORDER — FENOFIBRATE 48 MG/1
TABLET, COATED ORAL
Qty: 24 TAB | Refills: 1 | Status: SHIPPED | OUTPATIENT
Start: 2019-09-12 | End: 2020-02-25 | Stop reason: SDUPTHER

## 2019-09-12 RX ORDER — LOSARTAN POTASSIUM AND HYDROCHLOROTHIAZIDE 12.5; 5 MG/1; MG/1
1 TABLET ORAL
Qty: 90 TAB | Refills: 1 | Status: SHIPPED | OUTPATIENT
Start: 2019-09-12 | End: 2020-02-25 | Stop reason: SDUPTHER

## 2019-09-12 NOTE — PROGRESS NOTES
Subjective:     Kerri Newell is a 84 y.o. female who presents with DM.    HPI:   Seen in f/u for DM. She is feeling well  Needs refills on all meds.  Taking meds approp  She is due flu and ppv 23.  She is having more joint pain with OA.  She is seeing ortho for tx.  She has a referral to them but has been very busy with her  having surgery.  She will f/u with them soon.  Reviewed lab with pt.  Her a1c is stable at 6.6.  She has dec intake of fruit.   LP shows trg are down from 290 to 275.  She is intolerant of statin.  She is on tricor.  Taking approp.  HDL is down from 45 to 41.  Not doing aerobic exercise but remains active.     Usually her a1c goes up in summer d/t eating fruit.  Taking meds approp.    2 nites ago she felt pressure type pain.  The area was sore to the touch.  She put her o2 on and it resolved. The tenderness over the sternum lasted for 1 day.  Now it is better.    She will be due updated lab in 2/20.  Needs a1c and alb/cr ratio for DM.  Needs updated LP for chol.  Needs vitamin d d/t low d in past.  Now on otc supplement.   Bp is stable and controlled.  Taking meds approp      Subjective:       Patient Active Problem List    Diagnosis Date Noted   • Complete heart block (HCC)    • CKD (chronic kidney disease) stage 3, GFR 30-59 ml/min ([facility])    • DM (diabetes mellitus) type II, controlled, with peripheral vascular disorder ([facility]) 09/16/2018   • Age-related cataract of both eyes    • Vitamin D deficiency    • Esophageal stricture    • Diverticulosis    • Osteopenia of multiple sites    • Hiatal hernia    • Arthritis    • Glaucoma    • Essential hypertension, benign 09/04/2012   • COPD (chronic obstructive pulmonary disease) ([facility]) 04/05/2012   • Hyperlipidemia LDL goal <100 07/13/2010   • Preventative health care 07/24/2009   • GERD (gastroesophageal reflux disease) 07/24/2009       Current medicines (including changes today)  Current Outpatient Medications   Medication Sig  Dispense Refill   • SITagliptin (JANUVIA) 100 MG Tab Take 1 Tab by mouth every day. 90 Tab 1   • rabeprazole (ACIPHEX) 20 MG tablet Take 1 Tab by mouth every day. Take on empty stomach in AM 90 Tab 1   • metFORMIN (GLUCOPHAGE) 500 MG Tab Take 1 Tab by mouth 2 times a day, with meals. 180 Tab 1   • losartan-hydrochlorothiazide (HYZAAR) 50-12.5 MG per tablet Take 1 Tab by mouth every day. 90 Tab 1   • Indacaterol Maleate (ARCAPTA NEOHALER) 75 MCG Cap Inhale 1 Capsule by mouth every day. 90 Cap 1   • fluticasone (FLONASE) 50 MCG/ACT nasal spray Spray 2 Sprays in nose every day. 3 Bottle 1   • fenofibrate (TRICOR) 48 MG Tab TAKE 1 TABLET BY MOUTH EVERY 72 HOURS 24 Tab 1   • felodipine (PLENDIL) 2.5 MG TABLET SR 24 HR Take 1 Tab by mouth every day. 90 Tab 3   • felodipine (PLENDIL) 10 MG TABLET SR 24 HR Take 1 Tab by mouth every day. TAKE 1 TAB BY MOUTH EVERY DAY. 90 Tab 1   • amoxicillin (AMOXIL) 500 MG Cap TAKE 4 CAPSULES BY MOUTH 1 TIME FOR 1 DOSE 4 Cap 1   • albuterol (PROAIR HFA) 108 (90 Base) MCG/ACT Aero Soln inhalation aerosol Inhale 2 Puffs by mouth every 6 hours as needed for Shortness of Breath. 3 Inhaler 1   • Glucosamine HCl (GLUCOSAMINE PO) Take  by mouth.     • Cholecalciferol (VITAMIN D3) 2000 UNIT Cap Take  by mouth.     • acetaminophen (TYLENOL) 325 MG Tab Take 650 mg by mouth every four hours as needed.     • Cyanocobalamin (B-12) 2500 MCG Tab Take  by mouth.     • CALTRATE 600+D PO Take 1 Tab by mouth every day.       No current facility-administered medications for this visit.        Allergies   Allergen Reactions   • Sulfa Drugs        ROS  Constitutional: Negative. Negative for fever, chills, weight loss, malaise/fatigue and diaphoresis.   HENT: Negative. Negative for hearing loss, ear pain, nosebleeds, congestion, sore throat, neck pain, tinnitus and ear discharge.   Respiratory: Negative. Negative for cough, hemoptysis, sputum production, shortness of breath, wheezing and stridor.  "  Cardiovascular: Negative. Negative for chest pain, palpitations, orthopnea, claudication, leg swelling and PND.   Gastrointestinal: Denies nausea, vomiting, diarrhea, constipation, heartburn, melena or hematochezia.  Genitourinary: Denies dysuria, hematuria, urinary incontinence, frequency or urgency.        Objective:     /74 (BP Location: Right arm, Patient Position: Sitting, BP Cuff Size: Adult)   Pulse 64   Temp 36.2 °C (97.1 °F) (Temporal)   Ht 1.702 m (5' 7\")   Wt 80.7 kg (178 lb)   SpO2 91%  Body mass index is 27.88 kg/m².    Physical Exam:  Vitals reviewed.  Constitutional: Oriented to person, place, and time. appears well-developed and well-nourished. No distress.   Cardiovascular: Normal rate, regular rhythm, normal heart sounds and intact distal pulses. Exam reveals no gallop and no friction rub. No murmur heard. No carotid bruits.   Pulmonary/Chest: Effort normal and breath sounds normal. No stridor. No respiratory distress. no wheezes or rales. exhibits no tenderness.   Musculoskeletal: Normal range of motion. exhibits no edema. micky pedal pulses 2+.  Lymphadenopathy: No cervical or supraclavicular adenopathy.   Neurological: Alert and oriented to person, place, and time. exhibits normal muscle tone.  Skin: Skin is warm and dry. No diaphoresis.   Psychiatric: Normal mood and affect. Behavior is normal.      Assessment and Plan:     The following treatment plan was discussed:    1. DM (diabetes mellitus) type II, controlled, with peripheral vascular disorder (HCC)  SITagliptin (JANUVIA) 100 MG Tab    metFORMIN (GLUCOPHAGE) 500 MG Tab    Comp Metabolic Panel    HEMOGLOBIN A1C    MICROALBUMIN CREAT RATIO URINE    stable on meds and diet.  continue low carb diet.  inc exercise.  repeat lab in 2/20.  f/u for review.     2. Chest pain, unspecified type  EKG - Clinic Performed    has episode of atypical chest pain.  followed by cardiac.  do ekg today.  f/u with cardiac as planned.     3. " Gastroesophageal reflux disease without esophagitis  rabeprazole (ACIPHEX) 20 MG tablet    refilled all meds.     4. Essential hypertension, benign  losartan-hydrochlorothiazide (HYZAAR) 50-12.5 MG per tablet    felodipine (PLENDIL) 2.5 MG TABLET SR 24 HR    felodipine (PLENDIL) 10 MG TABLET SR 24 HR    Comp Metabolic Panel    MICROALBUMIN CREAT RATIO URINE    controlled.  refilled meds.     5. Other emphysema (HCC)  Indacaterol Maleate (ARCAPTA NEOHALER) 75 MCG Cap    fluticasone (FLONASE) 50 MCG/ACT nasal spray    albuterol (PROAIR HFA) 108 (90 Base) MCG/ACT Aero Soln inhalation aerosol    refilled meds.  followed by pulm   6. Hyperlipidemia LDL goal <100  fenofibrate (TRICOR) 48 MG Tab    Comp Metabolic Panel    Lipid Profile    not controlled.  did not shaye statins.  will try tricor 2x/wk for elevated trg.  recheck CMP, LP in 6 weeks.   f/u wiht pt with results.     7. Other emphysema (HCC)  Indacaterol Maleate (ARCAPTA NEOHALER) 75 MCG Cap    fluticasone (FLONASE) 50 MCG/ACT nasal spray    albuterol (PROAIR HFA) 108 (90 Base) MCG/ACT Aero Soln inhalation aerosol    refilled meds.  followed by pulm.  having more sx.  f/u with them as sched   8. Vitamin D deficiency  VITAMIN D,25 HYDROXY    she will be due updated lab 2/20.  stable on otc supplement.   9. Need for influenza vaccination  INFLUENZA VACCINE, HIGH DOSE (65+ ONLY)   10. Need for pneumococcal vaccination  Pneumococal Polysaccharide Vaccine 23-Valent =>1YO SQ/IM     EKG in office shows paced rhythm only.  No concerns.  Read by me.     Followup: Return in about 4 months (around 1/12/2020).

## 2019-10-16 ENCOUNTER — OFFICE VISIT (OUTPATIENT)
Dept: URGENT CARE | Facility: PHYSICIAN GROUP | Age: 84
End: 2019-10-16
Payer: MEDICARE

## 2019-10-16 VITALS
HEART RATE: 71 BPM | TEMPERATURE: 97.8 F | DIASTOLIC BLOOD PRESSURE: 66 MMHG | SYSTOLIC BLOOD PRESSURE: 134 MMHG | RESPIRATION RATE: 16 BRPM | BODY MASS INDEX: 27.94 KG/M2 | OXYGEN SATURATION: 94 % | HEIGHT: 67 IN | WEIGHT: 178 LBS

## 2019-10-16 DIAGNOSIS — N30.00 ACUTE CYSTITIS WITHOUT HEMATURIA: ICD-10-CM

## 2019-10-16 DIAGNOSIS — R35.0 URINARY FREQUENCY: ICD-10-CM

## 2019-10-16 DIAGNOSIS — R39.15 URINARY URGENCY: ICD-10-CM

## 2019-10-16 DIAGNOSIS — R30.0 DYSURIA: ICD-10-CM

## 2019-10-16 LAB
APPEARANCE UR: CLEAR
BILIRUB UR STRIP-MCNC: NEGATIVE MG/DL
COLOR UR AUTO: YELLOW
GLUCOSE UR STRIP.AUTO-MCNC: NEGATIVE MG/DL
KETONES UR STRIP.AUTO-MCNC: NEGATIVE MG/DL
LEUKOCYTE ESTERASE UR QL STRIP.AUTO: NORMAL
NITRITE UR QL STRIP.AUTO: NEGATIVE
PH UR STRIP.AUTO: 5 [PH] (ref 5–8)
PROT UR QL STRIP: NEGATIVE MG/DL
RBC UR QL AUTO: NEGATIVE
SP GR UR STRIP.AUTO: 1.02
UROBILINOGEN UR STRIP-MCNC: NORMAL MG/DL

## 2019-10-16 PROCEDURE — 81002 URINALYSIS NONAUTO W/O SCOPE: CPT | Performed by: NURSE PRACTITIONER

## 2019-10-16 PROCEDURE — 99214 OFFICE O/P EST MOD 30 MIN: CPT | Performed by: NURSE PRACTITIONER

## 2019-10-16 RX ORDER — CIPROFLOXACIN 500 MG/1
500 TABLET, FILM COATED ORAL EVERY 12 HOURS
Qty: 6 TAB | Refills: 0 | Status: SHIPPED | OUTPATIENT
Start: 2019-10-16 | End: 2019-10-19

## 2019-10-17 ASSESSMENT — ENCOUNTER SYMPTOMS
FLANK PAIN: 0
DIARRHEA: 0
BACK PAIN: 0
HEADACHES: 0
FEVER: 0
VOMITING: 0
MYALGIAS: 0
NAUSEA: 0
CONSTIPATION: 0
ABDOMINAL PAIN: 0
WEAKNESS: 0
CHILLS: 0
DIZZINESS: 0

## 2019-10-17 NOTE — PROGRESS NOTES
Subjective:      Kerri Newell is a 84 y.o. female who presents with Urinary Frequency (burning upon urination x 4 days)            HPI  Urine frequency, dysuria but has improved but still present. H/o incontinence, no recent diarrhea or vaginal irritation. Denies fever, n/v or abdominal pain. Going on vacation tomorrow morning.    PMH:  has a past medical history of Arthritis, CATARACT, CKD (chronic kidney disease) stage 3, GFR 30-59 ml/min ([facility]), Complete heart block ([facility]), COPD (chronic obstructive pulmonary disease) ([facility]), Diverticulosis, DM (diabetes mellitus) ([facility]), Dyslipidemia, Esophageal stricture, GERD (gastroesophageal reflux disease) (7/24/2009), Glaucoma, Hiatal hernia, HTN (hypertension), Osteopenia, and Vitamin D deficiency.  MEDS:   Current Outpatient Medications:   •  SITagliptin (JANUVIA) 100 MG Tab, Take 1 Tab by mouth every day., Disp: 90 Tab, Rfl: 1  •  rabeprazole (ACIPHEX) 20 MG tablet, Take 1 Tab by mouth every day. Take on empty stomach in AM, Disp: 90 Tab, Rfl: 1  •  metFORMIN (GLUCOPHAGE) 500 MG Tab, Take 1 Tab by mouth 2 times a day, with meals., Disp: 180 Tab, Rfl: 1  •  losartan-hydrochlorothiazide (HYZAAR) 50-12.5 MG per tablet, Take 1 Tab by mouth every day., Disp: 90 Tab, Rfl: 1  •  Indacaterol Maleate (ARCAPTA NEOHALER) 75 MCG Cap, Inhale 1 Capsule by mouth every day., Disp: 90 Cap, Rfl: 1  •  fluticasone (FLONASE) 50 MCG/ACT nasal spray, Spray 2 Sprays in nose every day., Disp: 3 Bottle, Rfl: 1  •  fenofibrate (TRICOR) 48 MG Tab, TAKE 1 TABLET BY MOUTH EVERY 72 HOURS, Disp: 24 Tab, Rfl: 1  •  felodipine (PLENDIL) 2.5 MG TABLET SR 24 HR, Take 1 Tab by mouth every day., Disp: 90 Tab, Rfl: 3  •  felodipine (PLENDIL) 10 MG TABLET SR 24 HR, Take 1 Tab by mouth every day. TAKE 1 TAB BY MOUTH EVERY DAY., Disp: 90 Tab, Rfl: 1  •  albuterol (PROAIR HFA) 108 (90 Base) MCG/ACT Aero Soln inhalation aerosol, Inhale 2 Puffs by mouth every 6 hours as needed for Shortness of Breath.,  "Disp: 3 Inhaler, Rfl: 1  •  Glucosamine HCl (GLUCOSAMINE PO), Take  by mouth., Disp: , Rfl:   •  Cholecalciferol (VITAMIN D3) 2000 UNIT Cap, Take  by mouth., Disp: , Rfl:   •  acetaminophen (TYLENOL) 325 MG Tab, Take 650 mg by mouth every four hours as needed., Disp: , Rfl:   •  Cyanocobalamin (B-12) 2500 MCG Tab, Take  by mouth., Disp: , Rfl:   •  CALTRATE 600+D PO, Take 1 Tab by mouth every day., Disp: , Rfl:   •  amoxicillin (AMOXIL) 500 MG Cap, TAKE 4 CAPSULES BY MOUTH 1 TIME FOR 1 DOSE (Patient not taking: Reported on 10/16/2019), Disp: 4 Cap, Rfl: 1  ALLERGIES:   Allergies   Allergen Reactions   • Sulfa Drugs      SURGHX:   Past Surgical History:   Procedure Laterality Date   • TONSILLECTOMY AND ADENOIDECTOMY       SOCHX:  reports that she quit smoking about 28 years ago. Her smoking use included cigarettes. She has a 112.00 pack-year smoking history. She has never used smokeless tobacco. She reports that she drinks alcohol. She reports that she does not use drugs.  FH: Family history was reviewed, no pertinent findings to report    Review of Systems   Constitutional: Negative for chills, fever and malaise/fatigue.   Gastrointestinal: Negative for abdominal pain, constipation, diarrhea, nausea and vomiting.   Genitourinary: Positive for dysuria, frequency and urgency. Negative for flank pain and hematuria.   Musculoskeletal: Negative for back pain and myalgias.   Skin: Negative for itching and rash.   Neurological: Negative for dizziness, weakness and headaches.   All other systems reviewed and are negative.         Objective:     /66 (BP Location: Left arm, Patient Position: Sitting, BP Cuff Size: Adult)   Pulse 71   Temp 36.6 °C (97.8 °F) (Temporal)   Resp 16   Ht 1.702 m (5' 7\")   Wt 80.7 kg (178 lb)   SpO2 94%   BMI 27.88 kg/m²      Physical Exam   Constitutional: She is oriented to person, place, and time. Vital signs are normal. She appears well-developed and well-nourished. She is active " and cooperative.  Non-toxic appearance. She does not have a sickly appearance. She does not appear ill. No distress.   HENT:   Head: Normocephalic.   Eyes: Pupils are equal, round, and reactive to light. Conjunctivae and EOM are normal.   Neck: Normal range of motion. Neck supple.   Cardiovascular: Normal rate and regular rhythm.   Pulmonary/Chest: Effort normal and breath sounds normal.   Abdominal: Soft. Bowel sounds are normal. She exhibits no distension. There is no tenderness. There is no rigidity, no rebound, no guarding and no CVA tenderness.   Musculoskeletal: Normal range of motion.   Neurological: She is alert and oriented to person, place, and time.   Skin: Skin is warm and dry. She is not diaphoretic.   Vitals reviewed.              Assessment/Plan:     1. Acute cystitis without hematuria    - ciprofloxacin (CIPRO) 500 MG Tab; Take 1 Tab by mouth every 12 hours for 3 days.  Dispense: 6 Tab; Refill: 0    2. Dysuria    - POCT Urinalysis    3. Urinary frequency    - POCT Urinalysis    4. Urinary urgency    - POCT Urinalysis    Increase water intake  Urinate more frequently and empty bladder completely  Practice good toileting hygiene after bowel movements and sexual intercourse, refrain from sexual intercourse until infection cleared  Monitor for back/flank pain, difficulty urinating, blood in urine- need re-evaluation

## 2020-01-15 ENCOUNTER — OFFICE VISIT (OUTPATIENT)
Dept: URGENT CARE | Facility: PHYSICIAN GROUP | Age: 85
End: 2020-01-15
Payer: MEDICARE

## 2020-01-15 VITALS
HEIGHT: 66 IN | SYSTOLIC BLOOD PRESSURE: 118 MMHG | WEIGHT: 178 LBS | HEART RATE: 68 BPM | DIASTOLIC BLOOD PRESSURE: 52 MMHG | OXYGEN SATURATION: 93 % | RESPIRATION RATE: 20 BRPM | TEMPERATURE: 98 F | BODY MASS INDEX: 28.61 KG/M2

## 2020-01-15 DIAGNOSIS — M54.50 ACUTE RIGHT-SIDED LOW BACK PAIN WITHOUT SCIATICA: ICD-10-CM

## 2020-01-15 PROCEDURE — 99214 OFFICE O/P EST MOD 30 MIN: CPT | Performed by: FAMILY MEDICINE

## 2020-01-15 ASSESSMENT — ENCOUNTER SYMPTOMS
TINGLING: 0
PARESIS: 0
NUMBNESS: 0
PERIANAL NUMBNESS: 0
BACK PAIN: 1
BOWEL INCONTINENCE: 0
WEAKNESS: 0

## 2020-01-15 NOTE — PATIENT INSTRUCTIONS
Back Pain, Adult  Back pain is very common in adults. The cause of back pain is rarely dangerous and the pain often gets better over time. The cause of your back pain may not be known. Some common causes of back pain include:  · Strain of the muscles or ligaments supporting the spine.  · Wear and tear (degeneration) of the spinal disks.  · Arthritis.  · Direct injury to the back.  For many people, back pain may return. Since back pain is rarely dangerous, most people can learn to manage this condition on their own.  Follow these instructions at home:  Watch your back pain for any changes. The following actions may help to lessen any discomfort you are feeling:  · Remain active. It is stressful on your back to sit or  one place for long periods of time. Do not sit, drive, or  one place for more than 30 minutes at a time. Take short walks on even surfaces as soon as you are able. Try to increase the length of time you walk each day.  · Exercise regularly as directed by your health care provider. Exercise helps your back heal faster. It also helps avoid future injury by keeping your muscles strong and flexible.  · Do not stay in bed. Resting more than 1-2 days can delay your recovery.  · Pay attention to your body when you bend and lift. The most comfortable positions are those that put less stress on your recovering back. Always use proper lifting techniques, including:  ¨ Bending your knees.  ¨ Keeping the load close to your body.  ¨ Avoiding twisting.  · Find a comfortable position to sleep. Use a firm mattress and lie on your side with your knees slightly bent. If you lie on your back, put a pillow under your knees.  · Avoid feeling anxious or stressed. Stress increases muscle tension and can worsen back pain. It is important to recognize when you are anxious or stressed and learn ways to manage it, such as with exercise.  · Take medicines only as directed by your health care provider.  Over-the-counter medicines to reduce pain and inflammation are often the most helpful. Your health care provider may prescribe muscle relaxant drugs. These medicines help dull your pain so you can more quickly return to your normal activities and healthy exercise.  · Apply ice to the injured area:  ¨ Put ice in a plastic bag.  ¨ Place a towel between your skin and the bag.  ¨ Leave the ice on for 20 minutes, 2-3 times a day for the first 2-3 days. After that, ice and heat may be alternated to reduce pain and spasms.  · Maintain a healthy weight. Excess weight puts extra stress on your back and makes it difficult to maintain good posture.  Contact a health care provider if:  · You have pain that is not relieved with rest or medicine.  · You have increasing pain going down into the legs or buttocks.  · You have pain that does not improve in one week.  · You have night pain.  · You lose weight.  · You have a fever or chills.  Get help right away if:  · You develop new bowel or bladder control problems.  · You have unusual weakness or numbness in your arms or legs.  · You develop nausea or vomiting.  · You develop abdominal pain.  · You feel faint.  This information is not intended to replace advice given to you by your health care provider. Make sure you discuss any questions you have with your health care provider.  Document Released: 12/18/2006 Document Revised: 04/27/2017 Document Reviewed: 04/21/2015  [employer] Interactive Patient Education © 2017 [employer]

## 2020-01-15 NOTE — PROGRESS NOTES
"Subjective:   Kerri Newell  is a 84 y.o. female who presents for Low Back Pain (x 6 days)        Back Pain   This is a new problem. The current episode started in the past 7 days. The problem occurs constantly. The problem has been waxing and waning since onset. The pain is present in the lumbar spine. The pain does not radiate. The pain is moderate. The symptoms are aggravated by bending. Pertinent negatives include no bladder incontinence, bowel incontinence, numbness, paresis, perianal numbness, tingling or weakness.     Review of Systems   Gastrointestinal: Negative for bowel incontinence.   Genitourinary: Negative for bladder incontinence.   Musculoskeletal: Positive for back pain.   Neurological: Negative for tingling, weakness and numbness.     Allergies   Allergen Reactions   • Sulfa Drugs       Objective:   /52 (BP Location: Left arm, Patient Position: Sitting, BP Cuff Size: Adult)   Pulse 68   Temp 36.7 °C (98 °F) (Temporal)   Resp 20   Ht 1.676 m (5' 6\")   Wt 80.7 kg (178 lb)   SpO2 93%   BMI 28.73 kg/m²   Physical Exam  Constitutional:       General: She is not in acute distress.     Appearance: She is well-developed.   HENT:      Head: Normocephalic and atraumatic.   Eyes:      Conjunctiva/sclera: Conjunctivae normal.      Pupils: Pupils are equal, round, and reactive to light.   Cardiovascular:      Rate and Rhythm: Normal rate and regular rhythm.      Heart sounds: Normal heart sounds. No murmur.   Pulmonary:      Effort: Pulmonary effort is normal. No respiratory distress.      Breath sounds: Normal breath sounds.   Abdominal:      General: Bowel sounds are normal. There is no distension.      Palpations: Abdomen is soft.      Tenderness: There is no tenderness.   Musculoskeletal:      Lumbar back: She exhibits decreased range of motion, tenderness and spasm. She exhibits no bony tenderness and no deformity.   Skin:     General: Skin is warm and dry.   Neurological:      " Mental Status: She is alert and oriented to person, place, and time.      Sensory: No sensory deficit.      Deep Tendon Reflexes: Reflexes are normal and symmetric.   Psychiatric:         Behavior: Behavior normal.           Assessment/Plan:   1. Acute right-sided low back pain without sciatica  Baclofen deferred by patient.    Differential diagnosis, natural history, supportive care, and indications for immediate follow-up discussed.

## 2020-02-21 ENCOUNTER — HOSPITAL ENCOUNTER (OUTPATIENT)
Dept: LAB | Facility: MEDICAL CENTER | Age: 85
End: 2020-02-21
Attending: NURSE PRACTITIONER
Payer: MEDICARE

## 2020-02-21 DIAGNOSIS — E55.9 VITAMIN D DEFICIENCY: ICD-10-CM

## 2020-02-21 DIAGNOSIS — I10 ESSENTIAL HYPERTENSION, BENIGN: ICD-10-CM

## 2020-02-21 DIAGNOSIS — E78.5 HYPERLIPIDEMIA LDL GOAL <100: ICD-10-CM

## 2020-02-21 DIAGNOSIS — E11.51 DM (DIABETES MELLITUS) TYPE II, CONTROLLED, WITH PERIPHERAL VASCULAR DISORDER (HCC): ICD-10-CM

## 2020-02-21 LAB
25(OH)D3 SERPL-MCNC: 38 NG/ML (ref 30–100)
ALBUMIN SERPL BCP-MCNC: 4.3 G/DL (ref 3.2–4.9)
ALBUMIN/GLOB SERPL: 1.2 G/DL
ALP SERPL-CCNC: 72 U/L (ref 30–99)
ALT SERPL-CCNC: 24 U/L (ref 2–50)
ANION GAP SERPL CALC-SCNC: 10 MMOL/L (ref 0–11.9)
AST SERPL-CCNC: 18 U/L (ref 12–45)
BILIRUB SERPL-MCNC: 0.4 MG/DL (ref 0.1–1.5)
BUN SERPL-MCNC: 28 MG/DL (ref 8–22)
CALCIUM SERPL-MCNC: 10 MG/DL (ref 8.5–10.5)
CHLORIDE SERPL-SCNC: 105 MMOL/L (ref 96–112)
CHOLEST SERPL-MCNC: 205 MG/DL (ref 100–199)
CO2 SERPL-SCNC: 24 MMOL/L (ref 20–33)
CREAT SERPL-MCNC: 1.16 MG/DL (ref 0.5–1.4)
CREAT UR-MCNC: 113.6 MG/DL
EST. AVERAGE GLUCOSE BLD GHB EST-MCNC: 140 MG/DL
FASTING STATUS PATIENT QL REPORTED: NORMAL
GLOBULIN SER CALC-MCNC: 3.7 G/DL (ref 1.9–3.5)
GLUCOSE SERPL-MCNC: 157 MG/DL (ref 65–99)
HBA1C MFR BLD: 6.5 % (ref 0–5.6)
HDLC SERPL-MCNC: 43 MG/DL
LDLC SERPL CALC-MCNC: 110 MG/DL
MICROALBUMIN UR-MCNC: 0.7 MG/DL
MICROALBUMIN/CREAT UR: 6 MG/G (ref 0–30)
POTASSIUM SERPL-SCNC: 4.1 MMOL/L (ref 3.6–5.5)
PROT SERPL-MCNC: 8 G/DL (ref 6–8.2)
SODIUM SERPL-SCNC: 139 MMOL/L (ref 135–145)
TRIGL SERPL-MCNC: 259 MG/DL (ref 0–149)

## 2020-02-21 PROCEDURE — 80053 COMPREHEN METABOLIC PANEL: CPT

## 2020-02-21 PROCEDURE — 83036 HEMOGLOBIN GLYCOSYLATED A1C: CPT | Mod: GA

## 2020-02-21 PROCEDURE — 82043 UR ALBUMIN QUANTITATIVE: CPT

## 2020-02-21 PROCEDURE — 82306 VITAMIN D 25 HYDROXY: CPT

## 2020-02-21 PROCEDURE — 36415 COLL VENOUS BLD VENIPUNCTURE: CPT

## 2020-02-21 PROCEDURE — 82570 ASSAY OF URINE CREATININE: CPT

## 2020-02-21 PROCEDURE — 80061 LIPID PANEL: CPT

## 2020-02-25 ENCOUNTER — OFFICE VISIT (OUTPATIENT)
Dept: MEDICAL GROUP | Facility: MEDICAL CENTER | Age: 85
End: 2020-02-25
Payer: MEDICARE

## 2020-02-25 VITALS
BODY MASS INDEX: 29.09 KG/M2 | TEMPERATURE: 96.9 F | HEART RATE: 69 BPM | DIASTOLIC BLOOD PRESSURE: 74 MMHG | OXYGEN SATURATION: 93 % | WEIGHT: 181 LBS | HEIGHT: 66 IN | SYSTOLIC BLOOD PRESSURE: 144 MMHG

## 2020-02-25 DIAGNOSIS — L98.9 SKIN LESION OF FOOT: ICD-10-CM

## 2020-02-25 DIAGNOSIS — Z12.11 SCREENING FOR MALIGNANT NEOPLASM OF COLON: ICD-10-CM

## 2020-02-25 DIAGNOSIS — E78.5 HYPERLIPIDEMIA LDL GOAL <100: ICD-10-CM

## 2020-02-25 DIAGNOSIS — K21.9 GASTROESOPHAGEAL REFLUX DISEASE WITHOUT ESOPHAGITIS: ICD-10-CM

## 2020-02-25 DIAGNOSIS — E11.51 DM (DIABETES MELLITUS) TYPE II, CONTROLLED, WITH PERIPHERAL VASCULAR DISORDER (HCC): ICD-10-CM

## 2020-02-25 DIAGNOSIS — I10 ESSENTIAL HYPERTENSION, BENIGN: ICD-10-CM

## 2020-02-25 DIAGNOSIS — J43.8 OTHER EMPHYSEMA (HCC): ICD-10-CM

## 2020-02-25 DIAGNOSIS — N18.30 CKD (CHRONIC KIDNEY DISEASE) STAGE 3, GFR 30-59 ML/MIN: ICD-10-CM

## 2020-02-25 PROCEDURE — 99214 OFFICE O/P EST MOD 30 MIN: CPT | Performed by: NURSE PRACTITIONER

## 2020-02-25 RX ORDER — FLUTICASONE PROPIONATE 50 MCG
2 SPRAY, SUSPENSION (ML) NASAL DAILY
Qty: 3 BOTTLE | Refills: 1 | Status: SHIPPED | OUTPATIENT
Start: 2020-02-25 | End: 2020-09-23 | Stop reason: SDUPTHER

## 2020-02-25 RX ORDER — ALBUTEROL SULFATE 90 UG/1
2 AEROSOL, METERED RESPIRATORY (INHALATION) EVERY 6 HOURS PRN
Qty: 3 INHALER | Refills: 1 | Status: SHIPPED | OUTPATIENT
Start: 2020-02-25 | End: 2020-09-23 | Stop reason: SDUPTHER

## 2020-02-25 RX ORDER — FENOFIBRATE 48 MG/1
TABLET, COATED ORAL
Qty: 24 TAB | Refills: 1 | Status: SHIPPED | OUTPATIENT
Start: 2020-02-25 | End: 2020-09-23 | Stop reason: SDUPTHER

## 2020-02-25 RX ORDER — LOSARTAN POTASSIUM AND HYDROCHLOROTHIAZIDE 12.5; 5 MG/1; MG/1
1 TABLET ORAL
Qty: 90 TAB | Refills: 1 | Status: SHIPPED | OUTPATIENT
Start: 2020-02-25 | End: 2020-09-23 | Stop reason: SDUPTHER

## 2020-02-25 RX ORDER — FELODIPINE 2.5 MG/1
2.5 TABLET, EXTENDED RELEASE ORAL
Qty: 90 TAB | Refills: 3 | Status: SHIPPED | OUTPATIENT
Start: 2020-02-25 | End: 2020-09-23 | Stop reason: SDUPTHER

## 2020-02-25 RX ORDER — RABEPRAZOLE SODIUM 20 MG/1
20 TABLET, DELAYED RELEASE ORAL DAILY
Qty: 90 TAB | Refills: 1 | Status: SHIPPED | OUTPATIENT
Start: 2020-02-25 | End: 2020-09-23 | Stop reason: SDUPTHER

## 2020-02-25 RX ORDER — INDACATEROL MALEATE 75 UG/1
1 CAPSULE ORAL; RESPIRATORY (INHALATION) DAILY
Qty: 90 CAP | Refills: 1 | Status: SHIPPED | OUTPATIENT
Start: 2020-02-25 | End: 2020-08-26

## 2020-02-25 RX ORDER — FELODIPINE 10 MG/1
10 TABLET, EXTENDED RELEASE ORAL
Qty: 90 TAB | Refills: 1 | Status: SHIPPED | OUTPATIENT
Start: 2020-02-25 | End: 2020-09-23 | Stop reason: SDUPTHER

## 2020-02-26 NOTE — PROGRESS NOTES
Subjective:     Kerri Newell is a 84 y.o. female who presents with DM.    HPI:   Seen in f/u for DM.  She is feeling well.  Had to cancel a cruise d/t the corona virus.    She is due refills on all meds.  Taking meds approp.  Her SBP is sl elevated today.  She takes her bp med in the evening.  She hasn't taken her med yet this evening.    Reviewed lab with pt.  Her GFR is down from 55 to 45.  Not drinking enough water.  CMP is wnl except for elevated glucose.  a1c is down from 6.6 to 6.5.  She is stable and controlled on her DM meds.   LP shows trg are still elevated but improved.  She does not shaye statins.  Is off pravastatin and on tricor.  Her HDL is up from 41 to 43.  LDL is up from 88 to 110.  She was on pravastatin for the 88.  Now on tricor and shaye well.  Goal is <100  Alb/cr ratio, D is wnl.   She has a PMA.  She saw cardiac last month.  It is running 100% now.  prob in [facility].         Patient Active Problem List    Diagnosis Date Noted   • Complete heart block (HCC)    • CKD (chronic kidney disease) stage 3, GFR 30-59 ml/min ([facility])    • DM (diabetes mellitus) type II, controlled, with peripheral vascular disorder ([facility]) 09/16/2018   • Age-related cataract of both eyes    • Vitamin D deficiency    • Esophageal stricture    • Diverticulosis    • Osteopenia of multiple sites    • Hiatal hernia    • Arthritis    • Glaucoma    • Essential hypertension, benign 09/04/2012   • COPD (chronic obstructive pulmonary disease) ([facility]) 04/05/2012   • Hyperlipidemia LDL goal <100 07/13/2010   • Preventative health care 07/24/2009   • GERD (gastroesophageal reflux disease) 07/24/2009       Current medicines (including changes today)  Current Outpatient Medications   Medication Sig Dispense Refill   • fenofibrate (TRICOR) 48 MG Tab TAKE 1 TABLET BY MOUTH EVERY 72 HOURS 24 Tab 1   • Indacaterol Maleate (ARCAPTA NEOHALER) 75 MCG Cap Inhale 1 Capsule by mouth every day. 90 Cap 1   • losartan-hydrochlorothiazide (HYZAAR)  50-12.5 MG per tablet Take 1 Tab by mouth every day. 90 Tab 1   • felodipine (PLENDIL) 10 MG TABLET SR 24 HR Take 1 Tab by mouth every day. TAKE 1 TAB BY MOUTH EVERY DAY. 90 Tab 1   • SITagliptin (JANUVIA) 100 MG Tab Take 1 Tab by mouth every day. 90 Tab 1   • felodipine (PLENDIL) 2.5 MG TABLET SR 24 HR Take 1 Tab by mouth every day. 90 Tab 3   • fluticasone (FLONASE) 50 MCG/ACT nasal spray Spray 2 Sprays in nose every day. 3 Bottle 1   • albuterol (PROAIR HFA) 108 (90 Base) MCG/ACT Aero Soln inhalation aerosol Inhale 2 Puffs by mouth every 6 hours as needed for Shortness of Breath. 3 Inhaler 1   • rabeprazole (ACIPHEX) 20 MG tablet Take 1 Tab by mouth every day. Take on empty stomach in AM 90 Tab 1   • metFORMIN (GLUCOPHAGE) 500 MG Tab Take 1 Tab by mouth 2 times a day, with meals. 180 Tab 1   • Glucosamine HCl (GLUCOSAMINE PO) Take  by mouth.     • Cholecalciferol (VITAMIN D3) 2000 UNIT Cap Take  by mouth.     • acetaminophen (TYLENOL) 325 MG Tab Take 650 mg by mouth every four hours as needed.     • Cyanocobalamin (B-12) 2500 MCG Tab Take  by mouth.       No current facility-administered medications for this visit.        Allergies   Allergen Reactions   • Sulfa Drugs        ROS  Constitutional: Negative. Negative for fever, chills, weight loss, malaise/fatigue and diaphoresis.   HENT: Negative. Negative for hearing loss, ear pain, nosebleeds, congestion, sore throat, neck pain, tinnitus and ear discharge.   Respiratory: Negative. Negative for cough, hemoptysis, sputum production, shortness of breath, wheezing and stridor.   Cardiovascular: Negative. Negative for chest pain, palpitations, orthopnea, claudication, leg swelling and PND.   Gastrointestinal: Denies nausea, vomiting, diarrhea, constipation, heartburn, melena or hematochezia.  Genitourinary: Denies dysuria, hematuria, urinary incontinence, frequency or urgency.        Objective:     /74   Pulse 69   Temp 36.1 °C (96.9 °F) (Temporal)   Ht 1.676  "m (5' 6\")   Wt 82.1 kg (181 lb)   SpO2 93%  Body mass index is 29.21 kg/m².    Physical Exam:  Physical Exam   Vitals reviewed.  Constitutional: oriented to person, place, and time. appears well-developed and well-nourished. No distress.   HENT:  Head: Normocephalic and atraumatic. Right Ear: External ear normal. Left Ear: External ear normal. Nose: Nose normal. Mouth/Throat: Oropharynx is clear and moist. No oropharyngeal exudate.  micky tm wnl with mod amt cerumen micky.  Eyes: Right eye exhibits no discharge. Left eye exhibits no discharge. No scleral icterus.  Neck: No JVD present.  Cardiovascular: Normal rate, regular rhythm, normal heart sounds and intact distal pulses.  Exam reveals no gallop and no friction rub.  No murmur heard.  No carotid bruits.   Pulmonary/Chest: Effort normal and breath sounds normal. No stridor. No respiratory distress. no wheezes or rales. exhibits no tenderness.   Musculoskeletal: Normal range of motion. exhibits no edema. micky pedal pulses 2+.  Lymphadenopathy: no cervical or supraclavicular adenopathy.   Neurological: alert and oriented to person, place, and time. exhibits normal muscle tone. Coordination normal.   Skin: Skin is warm and dry. no diaphoresis.   Psychiatric: normal mood and affect. behavior is normal.     Monofilament foot exam:  2+ micky pedal pulses.  Sensation intact micky LE.  No macerations or ulcerations.  She has 2 brown irreg lesions on left mid and distal foot.  Mid foot lesion is mildly elevated.         Assessment and Plan:     The following treatment plan was discussed:    1. DM (diabetes mellitus) type II, controlled, with peripheral vascular disorder (HCC)  SITagliptin (JANUVIA) 100 MG Tab    metFORMIN (GLUCOPHAGE) 500 MG Tab    Comp Metabolic Panel    HEMOGLOBIN A1C    Diabetic Monofilament LE Exam    stable on meds and diet.  continue low carb diet.  inc exercise.  repeat lab in 9/20.  f/u for review.     2. CKD (chronic kidney disease) stage 3, GFR 30-59 " ml/min (HCC)      gfr sl dec.  will increase water intake   3. Skin lesion of foot  REFERRAL TO DERMATOLOGY    refer derm for eval   4. Hyperlipidemia LDL goal <100  fenofibrate (TRICOR) 48 MG Tab    Comp Metabolic Panel    Lipid Profile    not controlled.  did not shaye statins. refill tricor.  recheck CMP, LP in 6 mo.   f/u to review results. .     5. Other emphysema (HCC)  Indacaterol Maleate (ARCAPTA NEOHALER) 75 MCG Cap    fluticasone (FLONASE) 50 MCG/ACT nasal spray    albuterol (PROAIR HFA) 108 (90 Base) MCG/ACT Aero Soln inhalation aerosol    refilled meds.  followed by pulm   6. Essential hypertension, benign  losartan-hydrochlorothiazide (HYZAAR) 50-12.5 MG per tablet    felodipine (PLENDIL) 10 MG TABLET SR 24 HR    felodipine (PLENDIL) 2.5 MG TABLET SR 24 HR    Comp Metabolic Panel    controlled.  refilled meds.     7. Gastroesophageal reflux disease without esophagitis  rabeprazole (ACIPHEX) 20 MG tablet    refilled all meds.     8. Screening for malignant neoplasm of colon  COLOGUARD (FIT DNA)    cologuard         Followup: Return in about 6 months (around 8/25/2020).

## 2020-08-26 ENCOUNTER — TELEMEDICINE (OUTPATIENT)
Dept: MEDICAL GROUP | Facility: MEDICAL CENTER | Age: 85
End: 2020-08-26
Payer: MEDICARE

## 2020-08-26 VITALS
DIASTOLIC BLOOD PRESSURE: 77 MMHG | WEIGHT: 175 LBS | SYSTOLIC BLOOD PRESSURE: 152 MMHG | HEIGHT: 66 IN | OXYGEN SATURATION: 90 % | HEART RATE: 82 BPM | BODY MASS INDEX: 28.12 KG/M2

## 2020-08-26 DIAGNOSIS — J43.8 OTHER EMPHYSEMA (HCC): ICD-10-CM

## 2020-08-26 PROCEDURE — 99213 OFFICE O/P EST LOW 20 MIN: CPT | Mod: 95,CR | Performed by: INTERNAL MEDICINE

## 2020-08-26 NOTE — PROGRESS NOTES
Virtual Visit: Established Patient   This visit was conducted via Zoom  using secure and encrypted videoconferencing technology. The patient was in a private location in the state of [place].    The patient's identity was confirmed and verbal consent was obtained for this virtual visit.    Subjective:   CC:   Chief Complaint   Patient presents with   • Medication Follow-up   COPD    Kerri Newell is a 85 y.o. female presenting for evaluation and management of:    COPD  She saw pulm (Dr Pardeep Barnes) a few weeks ago, switched her Arcapta, LABA inhaler for COPD to combined inhaler stiolto (LAMA + LABA). Her COPD is better with that but she started to have trouble urinating. So, she stopped and went back to old inhaler. She tried contacting pulm for 10 straight days for more refills since it's running out. No answer so far. She contacted pharmacy and was told that it has been discontinued from manufacturing.   She is looking for similar inhaler.  She tried steroid + LABA such as symbicort before, not effective for her.    ROS   Denies any recent fevers or chills. Low oxygen sat without inhaler around 89.    Allergies   Allergen Reactions   • Sulfa Drugs        Current medicines (including changes today)  Current Outpatient Medications   Medication Sig Dispense Refill   • salmeterol (SEREVENT) 50 MCG/DOSE AEROSOL POWDER, BREATH ACTIVATED Inhale 1 Puff by mouth 2 times a day. 60 Each 2   • fenofibrate (TRICOR) 48 MG Tab TAKE 1 TABLET BY MOUTH EVERY 72 HOURS 24 Tab 1   • losartan-hydrochlorothiazide (HYZAAR) 50-12.5 MG per tablet Take 1 Tab by mouth every day. 90 Tab 1   • felodipine (PLENDIL) 10 MG TABLET SR 24 HR Take 1 Tab by mouth every day. TAKE 1 TAB BY MOUTH EVERY DAY. 90 Tab 1   • SITagliptin (JANUVIA) 100 MG Tab Take 1 Tab by mouth every day. 90 Tab 1   • felodipine (PLENDIL) 2.5 MG TABLET SR 24 HR Take 1 Tab by mouth every day. 90 Tab 3   • fluticasone (FLONASE) 50 MCG/ACT nasal spray Spray 2 Sprays  in nose every day. 3 Bottle 1   • albuterol (PROAIR HFA) 108 (90 Base) MCG/ACT Aero Soln inhalation aerosol Inhale 2 Puffs by mouth every 6 hours as needed for Shortness of Breath. 3 Inhaler 1   • rabeprazole (ACIPHEX) 20 MG tablet Take 1 Tab by mouth every day. Take on empty stomach in AM 90 Tab 1   • metFORMIN (GLUCOPHAGE) 500 MG Tab Take 1 Tab by mouth 2 times a day, with meals. 180 Tab 1   • Glucosamine HCl (GLUCOSAMINE PO) Take  by mouth.     • Cholecalciferol (VITAMIN D3) 2000 UNIT Cap Take  by mouth.     • acetaminophen (TYLENOL) 325 MG Tab Take 650 mg by mouth every four hours as needed.     • Cyanocobalamin (B-12) 2500 MCG Tab Take  by mouth.       No current facility-administered medications for this visit.        Patient Active Problem List    Diagnosis Date Noted   • Complete heart block (HCC)    • CKD (chronic kidney disease) stage 3, GFR 30-59 ml/min ([facility])    • DM (diabetes mellitus) type II, controlled, with peripheral vascular disorder ([facility]) 09/16/2018   • Age-related cataract of both eyes    • Vitamin D deficiency    • Esophageal stricture    • Diverticulosis    • Osteopenia of multiple sites    • Hiatal hernia    • Arthritis    • Glaucoma    • Essential hypertension, benign 09/04/2012   • COPD (chronic obstructive pulmonary disease) ([facility]) 04/05/2012   • Hyperlipidemia LDL goal <100 07/13/2010   • Preventative health care 07/24/2009   • GERD (gastroesophageal reflux disease) 07/24/2009       Family History   Problem Relation Age of Onset   • Cancer Mother         lung CA.   • Hypertension Mother    • Cancer Father         throat and brain   • Diabetes Father    • Hypertension Father    • Heart Disease Father    • Heart Disease Maternal Grandfather    • Cancer Paternal Grandmother         pancreatic       She  has a past medical history of Arthritis, CATARACT, CKD (chronic kidney disease) stage 3, GFR 30-59 ml/min ([facility]), Complete heart block (HCC), COPD (chronic obstructive pulmonary disease) ([facility]),  "Diverticulosis, DM (diabetes mellitus) ([facility]), Dyslipidemia, Esophageal stricture, GERD (gastroesophageal reflux disease) (7/24/2009), Glaucoma, Hiatal hernia, HTN (hypertension), Osteopenia, and Vitamin D deficiency.  She  has a past surgical history that includes tonsillectomy and adenoidectomy.       Objective:   /77   Pulse 82   Ht 1.676 m (5' 6\")   Wt 79.4 kg (175 lb)   SpO2 90%   BMI 28.25 kg/m²     Physical Exam:  Constitutional: Alert, no distress, well-groomed.  Skin: No rashes in visible areas.  Eye: Round. Conjunctiva clear, lids normal. No icterus.   ENMT: Lips pink without lesions, moist mucous membranes. Phonation normal.  Neck: No visible masses, no thyromegaly. Moves freely without pain.  Respiratory: Unlabored respiratory effort, no cough or audible wheeze  Psych: Alert and oriented x3, normal affect and mood.       Assessment and Plan:   The following treatment plan was discussed:     1. Other emphysema (HCC)  - start another LABA inhaler for now since she did not do well with LAMA or steroid combined inhaler. Follow up with pulm for possible trial of other combined inhalers. Call clinic to replace with other LABAs if there is price / formulary issue.  - salmeterol (SEREVENT) 50 MCG/DOSE AEROSOL POWDER, BREATH ACTIVATED; Inhale 1 Puff by mouth 2 times a day.  Dispense: 60 Each; Refill: 2        Follow-up: Return if symptoms worsen or fail to improve.           "

## 2020-09-07 ENCOUNTER — TELEPHONE (OUTPATIENT)
Dept: MEDICAL GROUP | Facility: MEDICAL CENTER | Age: 85
End: 2020-09-07

## 2020-09-07 DIAGNOSIS — E11.51 DM (DIABETES MELLITUS) TYPE II, CONTROLLED, WITH PERIPHERAL VASCULAR DISORDER (HCC): ICD-10-CM

## 2020-09-07 RX ORDER — SITAGLIPTIN 100 MG/1
TABLET, FILM COATED ORAL
Qty: 30 TAB | Refills: 0 | Status: SHIPPED | OUTPATIENT
Start: 2020-09-07 | End: 2020-09-23 | Stop reason: SDUPTHER

## 2020-09-15 ENCOUNTER — TELEPHONE (OUTPATIENT)
Dept: MEDICAL GROUP | Facility: MEDICAL CENTER | Age: 85
End: 2020-09-15

## 2020-09-15 ENCOUNTER — HOSPITAL ENCOUNTER (OUTPATIENT)
Dept: LAB | Facility: MEDICAL CENTER | Age: 85
End: 2020-09-15
Attending: NURSE PRACTITIONER
Payer: MEDICARE

## 2020-09-15 DIAGNOSIS — E11.51 DM (DIABETES MELLITUS) TYPE II, CONTROLLED, WITH PERIPHERAL VASCULAR DISORDER (HCC): ICD-10-CM

## 2020-09-15 DIAGNOSIS — I10 ESSENTIAL HYPERTENSION, BENIGN: ICD-10-CM

## 2020-09-15 DIAGNOSIS — E78.5 HYPERLIPIDEMIA LDL GOAL <100: ICD-10-CM

## 2020-09-15 LAB
ALBUMIN SERPL BCP-MCNC: 4.3 G/DL (ref 3.2–4.9)
ALBUMIN/GLOB SERPL: 1.5 G/DL
ALP SERPL-CCNC: 79 U/L (ref 30–99)
ALT SERPL-CCNC: 26 U/L (ref 2–50)
ANION GAP SERPL CALC-SCNC: 17 MMOL/L (ref 7–16)
AST SERPL-CCNC: 20 U/L (ref 12–45)
BILIRUB SERPL-MCNC: 0.4 MG/DL (ref 0.1–1.5)
BUN SERPL-MCNC: 25 MG/DL (ref 8–22)
CALCIUM SERPL-MCNC: 10 MG/DL (ref 8.5–10.5)
CHLORIDE SERPL-SCNC: 101 MMOL/L (ref 96–112)
CHOLEST SERPL-MCNC: 195 MG/DL (ref 100–199)
CO2 SERPL-SCNC: 21 MMOL/L (ref 20–33)
CREAT SERPL-MCNC: 1.17 MG/DL (ref 0.5–1.4)
EST. AVERAGE GLUCOSE BLD GHB EST-MCNC: 140 MG/DL
FASTING STATUS PATIENT QL REPORTED: NORMAL
GLOBULIN SER CALC-MCNC: 2.9 G/DL (ref 1.9–3.5)
GLUCOSE SERPL-MCNC: 162 MG/DL (ref 65–99)
HBA1C MFR BLD: 6.5 % (ref 0–5.6)
HDLC SERPL-MCNC: 44 MG/DL
LDLC SERPL CALC-MCNC: 99 MG/DL
POTASSIUM SERPL-SCNC: 4.5 MMOL/L (ref 3.6–5.5)
PROT SERPL-MCNC: 7.2 G/DL (ref 6–8.2)
SODIUM SERPL-SCNC: 139 MMOL/L (ref 135–145)
TRIGL SERPL-MCNC: 261 MG/DL (ref 0–149)

## 2020-09-15 PROCEDURE — 83036 HEMOGLOBIN GLYCOSYLATED A1C: CPT | Mod: GA

## 2020-09-15 PROCEDURE — 80053 COMPREHEN METABOLIC PANEL: CPT

## 2020-09-15 PROCEDURE — 36415 COLL VENOUS BLD VENIPUNCTURE: CPT

## 2020-09-15 PROCEDURE — 80061 LIPID PANEL: CPT

## 2020-09-15 NOTE — TELEPHONE ENCOUNTER
VOICEMAIL  1. Caller Name: Alisson from [facility]                     Call Back Number:  [phone]  Fax number     2. Message: Need faxed a certificate of medical necessity from [employer], faxed a new one this morning. Requesting a fax back to fax number above. Glo did you receive this?      3. Patient approves office to leave a detailed voicemail/MyChart message: N\A

## 2020-09-23 ENCOUNTER — OFFICE VISIT (OUTPATIENT)
Dept: MEDICAL GROUP | Facility: MEDICAL CENTER | Age: 85
End: 2020-09-23
Payer: MEDICARE

## 2020-09-23 VITALS
WEIGHT: 178 LBS | BODY MASS INDEX: 28.61 KG/M2 | HEART RATE: 82 BPM | HEIGHT: 66 IN | OXYGEN SATURATION: 94 % | TEMPERATURE: 97.3 F | DIASTOLIC BLOOD PRESSURE: 70 MMHG | SYSTOLIC BLOOD PRESSURE: 130 MMHG

## 2020-09-23 DIAGNOSIS — E55.9 VITAMIN D DEFICIENCY: ICD-10-CM

## 2020-09-23 DIAGNOSIS — E78.5 HYPERLIPIDEMIA LDL GOAL <100: ICD-10-CM

## 2020-09-23 DIAGNOSIS — J44.1 CHRONIC OBSTRUCTIVE PULMONARY DISEASE WITH ACUTE EXACERBATION (HCC): ICD-10-CM

## 2020-09-23 DIAGNOSIS — H81.90 VESTIBULAR DIZZINESS: ICD-10-CM

## 2020-09-23 DIAGNOSIS — I10 ESSENTIAL HYPERTENSION, BENIGN: ICD-10-CM

## 2020-09-23 DIAGNOSIS — E11.51 DM (DIABETES MELLITUS) TYPE II, CONTROLLED, WITH PERIPHERAL VASCULAR DISORDER (HCC): ICD-10-CM

## 2020-09-23 DIAGNOSIS — H61.22 IMPACTED CERUMEN OF LEFT EAR: ICD-10-CM

## 2020-09-23 DIAGNOSIS — J43.8 OTHER EMPHYSEMA (HCC): ICD-10-CM

## 2020-09-23 DIAGNOSIS — K21.9 GASTROESOPHAGEAL REFLUX DISEASE WITHOUT ESOPHAGITIS: ICD-10-CM

## 2020-09-23 PROCEDURE — 99214 OFFICE O/P EST MOD 30 MIN: CPT | Performed by: NURSE PRACTITIONER

## 2020-09-23 RX ORDER — RABEPRAZOLE SODIUM 20 MG/1
20 TABLET, DELAYED RELEASE ORAL DAILY
Qty: 90 TAB | Refills: 3 | Status: SHIPPED | OUTPATIENT
Start: 2020-09-23 | End: 2021-04-08 | Stop reason: SDUPTHER

## 2020-09-23 RX ORDER — FENOFIBRATE 48 MG/1
TABLET, COATED ORAL
Qty: 36 TAB | Refills: 3 | Status: SHIPPED | OUTPATIENT
Start: 2020-09-23 | End: 2021-04-08 | Stop reason: SDUPTHER

## 2020-09-23 RX ORDER — FELODIPINE 10 MG/1
10 TABLET, EXTENDED RELEASE ORAL
Qty: 90 TAB | Refills: 3 | Status: SHIPPED | OUTPATIENT
Start: 2020-09-23 | End: 2021-04-08 | Stop reason: SDUPTHER

## 2020-09-23 RX ORDER — ALBUTEROL SULFATE 90 UG/1
2 AEROSOL, METERED RESPIRATORY (INHALATION) EVERY 6 HOURS PRN
Qty: 3 EACH | Refills: 3 | Status: SHIPPED | OUTPATIENT
Start: 2020-09-23 | End: 2021-04-08 | Stop reason: SDUPTHER

## 2020-09-23 RX ORDER — FLUTICASONE PROPIONATE 50 MCG
2 SPRAY, SUSPENSION (ML) NASAL DAILY
Qty: 9.9 ML | Refills: 6 | Status: SHIPPED | OUTPATIENT
Start: 2020-09-23 | End: 2021-04-08 | Stop reason: SDUPTHER

## 2020-09-23 RX ORDER — LOSARTAN POTASSIUM AND HYDROCHLOROTHIAZIDE 12.5; 5 MG/1; MG/1
1 TABLET ORAL
Qty: 90 TAB | Refills: 3 | Status: SHIPPED | OUTPATIENT
Start: 2020-09-23 | End: 2021-04-08 | Stop reason: SDUPTHER

## 2020-09-23 RX ORDER — FELODIPINE 2.5 MG/1
2.5 TABLET, EXTENDED RELEASE ORAL
Qty: 90 TAB | Refills: 3 | Status: SHIPPED | OUTPATIENT
Start: 2020-09-23 | End: 2021-04-08 | Stop reason: SDUPTHER

## 2020-09-23 ASSESSMENT — PATIENT HEALTH QUESTIONNAIRE - PHQ9: CLINICAL INTERPRETATION OF PHQ2 SCORE: 0

## 2020-09-23 NOTE — NON-PROVIDER
Subjective:     Kerri Newell is a 85 y.o. female who presents with COPD.    HPI:     Labs reviewed with pt.    CMP showed elevated glucose at 162 and BUN at 53. Her GFR remains low at 44. She thinks she may not be drinking enough fluids. Continue to encourage adequate hydration at least 8 glasses of water/day.    Her A1C is 6.5, same from the one done in 2/20.  She is compliant with her medication. She is trying to be more mindful with diet and exercise.    Her LP showed triglycerides at 261 from 259 in 2/20. Her HDL is at 44 from 43 in 2/20. LDL is better at 99 from 110 in 2/20.  She is on tricor q72 hours as she can not tolerate statins and other lipid lower agents with debilitating joint pain and myalgias. Recommended patient try to increase dose to 3x/week and see if she can tolerate this to help improve her lipid panel.     Pt reports that her COPD seems to be better controlled on new inhaler (Serevent).  She was initially using it twice a day but felt some lightheadedness and weakness one hour post use so she cut back to once a day.  She felt better even on once a day dosing and has not used her rescue inhaler nor used supplemental oxygen during the day since starting on Serevent. She wants a new pulmonologist. Referral to [facility] with Dr. Woodard done.    She also reports sharp pains and tinnitus on both of her ears. The pains usually occur in the cavum and [place] area.  She gets her ears cleaned annually with her ENT (Dr. Mcgraw). She reports positional dizziness and lightheadedness lately. She thinks she may not be drinking enough water. Referral to PT for vestibular therapy done. She had seen PT in the past for same issue.    Her blood pressure is at 130/70 today.  It is well controlled on medications as well as diet and exercise. Meds refilled per request.    Her GERD is stable on medications. She has no new complaints at this time. Requesting refill.    Her Vit D level back in 2/20 was  at 38.  She is on OTC Vit D supplement. She will continue taking this. Will recheck level prior to next appointment.    Patient Active Problem List    Diagnosis Date Noted   • Complete heart block ([facility])    • CKD (chronic kidney disease) stage 3, GFR 30-59 ml/min ([facility])    • DM (diabetes mellitus) type II, controlled, with peripheral vascular disorder ([facility]) 09/16/2018   • Age-related cataract of both eyes    • Vitamin D deficiency    • Esophageal stricture    • Diverticulosis    • Osteopenia of multiple sites    • Hiatal hernia    • Arthritis    • Glaucoma    • Essential hypertension, benign 09/04/2012   • COPD (chronic obstructive pulmonary disease) ([facility]) 04/05/2012   • Hyperlipidemia LDL goal <100 07/13/2010   • Preventative health care 07/24/2009   • GERD (gastroesophageal reflux disease) 07/24/2009       Current medicines (including changes today)  Current Outpatient Medications   Medication Sig Dispense Refill   • SITagliptin (JANUVIA) 100 MG Tab Take 1 Tab by mouth every day. 90 Tab 1   • salmeterol (SEREVENT) 50 MCG/DOSE AEROSOL POWDER, BREATH ACTIVATED Inhale 1 Puff by mouth 2 times a day. 90 Each 3   • fenofibrate (TRICOR) 48 MG Tab TAKE 1 TABLET BY MOUTH EVERY 72 HOURS 36 Tab 3   • losartan-hydrochlorothiazide (HYZAAR) 50-12.5 MG per tablet Take 1 Tab by mouth every day. 90 Tab 3   • felodipine (PLENDIL) 10 MG TABLET SR 24 HR Take 1 Tab by mouth every day. TAKE 1 TAB BY MOUTH EVERY DAY. 90 Tab 3   • felodipine (PLENDIL) 2.5 MG TABLET SR 24 HR Take 1 Tab by mouth every day. 90 Tab 3   • fluticasone (FLONASE) 50 MCG/ACT nasal spray Spray 2 Sprays in nose every day. 9.9 mL 6   • albuterol (PROAIR HFA) 108 (90 Base) MCG/ACT Aero Soln inhalation aerosol Inhale 2 Puffs by mouth every 6 hours as needed for Shortness of Breath. 3 Each 3   • rabeprazole (ACIPHEX) 20 MG tablet Take 1 Tab by mouth every day. Take on empty stomach in AM 90 Tab 3   • metFORMIN (GLUCOPHAGE) 500 MG Tab Take 1 Tab by mouth 2 times a day,  "with meals. 180 Tab 3   • Glucosamine HCl (GLUCOSAMINE PO) Take  by mouth.     • Cholecalciferol (VITAMIN D3) 2000 UNIT Cap Take  by mouth.     • acetaminophen (TYLENOL) 325 MG Tab Take 650 mg by mouth every four hours as needed.     • Cyanocobalamin (B-12) 2500 MCG Tab Take  by mouth.       No current facility-administered medications for this visit.        Allergies   Allergen Reactions   • Stiolto Respimat [Tiotropium Bromide-Olodaterol]      Cannot urinate   • Sulfa Drugs        ROS  Constitutional: Negative. Negative for fever, chills, weight loss, malaise/fatigue and diaphoresis.   HENT: Negative. Negative for hearing loss, nosebleeds, congestion, sore throat, neck pain, and ear discharge.   Respiratory: Negative. Negative for cough, hemoptysis, sputum production, shortness of breath, wheezing and stridor.   Cardiovascular: Negative. Negative for chest pain, palpitations, orthopnea, claudication, leg swelling and PND.   Gastrointestinal: Denies nausea, vomiting, diarrhea, constipation, heartburn, melena or hematochezia.  Genitourinary: Denies dysuria, hematuria, urinary incontinence, frequency or urgency.        Objective:     /70 (BP Location: Right arm, Patient Position: Sitting)   Pulse 82   Temp 36.3 °C (97.3 °F) (Temporal)   Ht 1.676 m (5' 6\")   Wt 80.7 kg (178 lb)   SpO2 94%  Body mass index is 28.73 kg/m².    Physical Exam:  Vitals reviewed.  Constitutional: Oriented to person, place, and time. appears well-developed and well-nourished. No distress.   HENT; left ear with impacted dried up cerumen, no discharge, TM wnl; right ear wnl  Cardiovascular: Normal rate, regular rhythm, normal heart sounds and intact distal pulses. Exam reveals no gallop and no friction rub. No murmur heard. No carotid bruits.   Pulmonary/Chest: Effort normal and breath sounds normal. No stridor. No respiratory distress. no wheezes or rales. exhibits no tenderness.   Musculoskeletal: Normal range of motion. exhibits " no edema. micky pedal pulses 2+.  Lymphadenopathy: No cervical or supraclavicular adenopathy.   Neurological: Alert and oriented to person, place, and time. exhibits normal muscle tone.  Skin: Skin is warm and dry. No diaphoresis.   Psychiatric: Normal mood and affect. Behavior is normal.      Assessment and Plan:     The following treatment plan was discussed:    1. DM (diabetes mellitus) type II, controlled, with peripheral vascular disorder (HCC)  SITagliptin (JANUVIA) 100 MG Tab    metFORMIN (GLUCOPHAGE) 500 MG Tab    Comp Metabolic Panel    MICROALB/CREAT RATIO RAND. UR    HEMOGLOBIN A1C    A1C stable on meds and diet; fasting glucose up at 162;  reinforce low carb diet.  inc exercise.  repeat lab in 6m then f/u for review.     2. Other emphysema (HCC)  salmeterol (SEREVENT) 50 MCG/DOSE AEROSOL POWDER, BREATH ACTIVATED    fluticasone (FLONASE) 50 MCG/ACT nasal spray    albuterol (PROAIR HFA) 108 (90 Base) MCG/ACT Aero Soln inhalation aerosol    refilled meds; better control on Serevent; referred to Dr. Woodard per request   3. Chronic obstructive pulmonary disease with acute exacerbation (HCC)  REFERRAL TO PULMONARY AND SLEEP MEDICINE General Pulmonary    improved sx on new inhaler; refilled, f/u in 6m; referred to Dr. Woodard per request   4. Hyperlipidemia LDL goal <100  fenofibrate (TRICOR) 48 MG Tab    LIPID PANEL    mixed response; did not shaye statin; try to increase tricor to 3x/week, refilled; recheck CMP, LP in 6 mo.   f/u to review results. .     5. Essential hypertension, benign  losartan-hydrochlorothiazide (HYZAAR) 50-12.5 MG per tablet    felodipine (PLENDIL) 10 MG TABLET SR 24 HR    felodipine (PLENDIL) 2.5 MG TABLET SR 24 HR    controlled.  refilled meds.     6. Gastroesophageal reflux disease without esophagitis  rabeprazole (ACIPHEX) 20 MG tablet    stable; refilled all meds.     7. Vestibular dizziness  REFERRAL TO PHYSICAL THERAPY Reason for Therapy: Eval/Treat/Report    positional, encourage fluids;  refer to PT fo vestibular therapy   8. Vitamin D deficiency  VITAMIN D,25 HYDROXY    continue OTC Vit D; repeat labs in 6m and f/u with results   9. Impacted cerumen of left ear      will see ENT Dr. Mcgraw as previously scheduled for wash out     10. Pt will do labs prior to appointment. Lab slip provided.    Followup: Return in about 6 months (around 3/23/2021).

## 2020-11-09 ENCOUNTER — TELEPHONE (OUTPATIENT)
Dept: MEDICAL GROUP | Facility: MEDICAL CENTER | Age: 85
End: 2020-11-09

## 2020-11-11 RX ORDER — AMOXICILLIN 500 MG/1
CAPSULE ORAL
Qty: 4 CAP | Refills: 1 | Status: SHIPPED | OUTPATIENT
Start: 2020-11-11 | End: 2022-03-24

## 2021-01-11 DIAGNOSIS — Z23 NEED FOR VACCINATION: ICD-10-CM

## 2021-01-25 ENCOUNTER — TELEPHONE (OUTPATIENT)
Dept: MEDICAL GROUP | Facility: MEDICAL CENTER | Age: 86
End: 2021-01-25

## 2021-01-26 NOTE — TELEPHONE ENCOUNTER
PT called, Left VM for us to cb. Called pt back, Unable to leave VM    STEFANI Sempel  Med Ass't

## 2021-02-03 ENCOUNTER — IMMUNIZATION (OUTPATIENT)
Dept: FAMILY PLANNING/WOMEN'S HEALTH CLINIC | Facility: IMMUNIZATION CENTER | Age: 86
End: 2021-02-03
Payer: MEDICARE

## 2021-02-03 DIAGNOSIS — Z23 ENCOUNTER FOR VACCINATION: Primary | ICD-10-CM

## 2021-02-03 PROCEDURE — 0001A PFIZER SARS-COV-2 VACCINE: CPT

## 2021-02-03 PROCEDURE — 91300 PFIZER SARS-COV-2 VACCINE: CPT

## 2021-02-27 ENCOUNTER — IMMUNIZATION (OUTPATIENT)
Dept: FAMILY PLANNING/WOMEN'S HEALTH CLINIC | Facility: IMMUNIZATION CENTER | Age: 86
End: 2021-02-27
Payer: MEDICARE

## 2021-02-27 DIAGNOSIS — Z23 ENCOUNTER FOR VACCINATION: Primary | ICD-10-CM

## 2021-02-27 PROCEDURE — 91300 PFIZER SARS-COV-2 VACCINE: CPT | Performed by: INTERNAL MEDICINE

## 2021-02-27 PROCEDURE — 0002A PFIZER SARS-COV-2 VACCINE: CPT | Performed by: INTERNAL MEDICINE

## 2021-03-25 ENCOUNTER — HOSPITAL ENCOUNTER (OUTPATIENT)
Dept: LAB | Facility: MEDICAL CENTER | Age: 86
End: 2021-03-25
Attending: NURSE PRACTITIONER
Payer: MEDICARE

## 2021-03-25 DIAGNOSIS — E55.9 VITAMIN D DEFICIENCY: ICD-10-CM

## 2021-03-25 DIAGNOSIS — E11.51 DM (DIABETES MELLITUS) TYPE II, CONTROLLED, WITH PERIPHERAL VASCULAR DISORDER (HCC): ICD-10-CM

## 2021-03-25 LAB
ALBUMIN SERPL BCP-MCNC: 4.3 G/DL (ref 3.2–4.9)
ALBUMIN/GLOB SERPL: 1.3 G/DL
ALP SERPL-CCNC: 76 U/L (ref 30–99)
ALT SERPL-CCNC: 27 U/L (ref 2–50)
ANION GAP SERPL CALC-SCNC: 12 MMOL/L (ref 7–16)
AST SERPL-CCNC: 20 U/L (ref 12–45)
BILIRUB SERPL-MCNC: 0.3 MG/DL (ref 0.1–1.5)
BUN SERPL-MCNC: 22 MG/DL (ref 8–22)
CALCIUM SERPL-MCNC: 10.2 MG/DL (ref 8.5–10.5)
CHLORIDE SERPL-SCNC: 104 MMOL/L (ref 96–112)
CHOLEST SERPL-MCNC: 198 MG/DL (ref 100–199)
CO2 SERPL-SCNC: 23 MMOL/L (ref 20–33)
CREAT SERPL-MCNC: 1.18 MG/DL (ref 0.5–1.4)
CREAT UR-MCNC: 117.24 MG/DL
EST. AVERAGE GLUCOSE BLD GHB EST-MCNC: 140 MG/DL
FASTING STATUS PATIENT QL REPORTED: NORMAL
GLOBULIN SER CALC-MCNC: 3.3 G/DL (ref 1.9–3.5)
GLUCOSE SERPL-MCNC: 156 MG/DL (ref 65–99)
HBA1C MFR BLD: 6.5 % (ref 4–5.6)
HDLC SERPL-MCNC: 41 MG/DL
LDLC SERPL CALC-MCNC: 95 MG/DL
MICROALBUMIN UR-MCNC: <1.2 MG/DL
MICROALBUMIN/CREAT UR: NORMAL MG/G (ref 0–30)
POTASSIUM SERPL-SCNC: 4.2 MMOL/L (ref 3.6–5.5)
PROT SERPL-MCNC: 7.6 G/DL (ref 6–8.2)
SODIUM SERPL-SCNC: 139 MMOL/L (ref 135–145)
TRIGL SERPL-MCNC: 309 MG/DL (ref 0–149)

## 2021-03-25 PROCEDURE — 83036 HEMOGLOBIN GLYCOSYLATED A1C: CPT

## 2021-03-25 PROCEDURE — 82570 ASSAY OF URINE CREATININE: CPT

## 2021-03-25 PROCEDURE — 82043 UR ALBUMIN QUANTITATIVE: CPT

## 2021-03-25 PROCEDURE — 82306 VITAMIN D 25 HYDROXY: CPT

## 2021-03-25 PROCEDURE — 36415 COLL VENOUS BLD VENIPUNCTURE: CPT

## 2021-03-25 PROCEDURE — 80053 COMPREHEN METABOLIC PANEL: CPT

## 2021-03-25 PROCEDURE — 80061 LIPID PANEL: CPT

## 2021-03-30 LAB — 25(OH)D3 SERPL-MCNC: 50 NG/ML (ref 30–100)

## 2021-03-31 ENCOUNTER — TELEPHONE (OUTPATIENT)
Dept: MEDICAL GROUP | Facility: MEDICAL CENTER | Age: 86
End: 2021-03-31

## 2021-03-31 NOTE — TELEPHONE ENCOUNTER
Lab called states they are reporting a correction in pt VIT D lab. Previously reported at 35(this was the error).    It is corrected at 50.

## 2021-04-01 ENCOUNTER — TELEPHONE (OUTPATIENT)
Dept: MEDICAL GROUP | Facility: MEDICAL CENTER | Age: 86
End: 2021-04-01

## 2021-04-01 NOTE — TELEPHONE ENCOUNTER
ESTABLISHED PATIENT PRE-VISIT PLANNING     Patient was NOT contacted to complete PVP.  ----------------------------------------------------------------------------------------------------    1.  Reviewed notes from the last few office visits within the medical group: Yes    2.  If any orders were placed at last visit or intended to be done for this visit (i.e. 6 mos follow-up), do we have Results/Consult Notes?        •  Labs - Labs ordered, completed on 03/25/2021 and results are in chart.       •  Imaging - Imaging was not ordered at last office visit.       •  Referrals - No referrals were ordered at last office visit.    3. Is this appointment scheduled as a Hospital Follow-Up? No    4.  Immunizations were updated in Epic using Reconcile Outside Information activity? Yes    5.  Patient is due for the following Health Maintenance Topics:   Health Maintenance Due   Topic Date Due   • IMM HEP B VACCINE (1 of 3 - Risk 3-dose series) Never done   • IMM DTaP/Tdap/Td Vaccine (1 - Tdap) Never done   • IMM ZOSTER VACCINES (1 of 2) Never done   • Annual Pulmonary Function Test / Spirometry  02/28/2013   • Annual Wellness Visit  03/22/2017   • COLON CANCER SCREENING ANNUAL FIT  09/07/2019   • RETINAL SCREENING  09/25/2020   • DIABETES MONOFILAMENT / LE EXAM  02/25/2021       ----------------------------------------------------------------------------------------------------  Pre-Visit Planning note has been created for the Provider and Medical Assistant to review prior to the patient's office appointment. Patient is NOT REQUIRED to follow-up on this note.   Outside information NOT reconciled using the [employer] feature. Per Charmaine May, the [employer] feature is down as of 02/09/2021 at 2:00pm. Will reconcile outside information at a later date.

## 2021-04-08 ENCOUNTER — OFFICE VISIT (OUTPATIENT)
Dept: MEDICAL GROUP | Facility: MEDICAL CENTER | Age: 86
End: 2021-04-08
Payer: MEDICARE

## 2021-04-08 VITALS
BODY MASS INDEX: 28.64 KG/M2 | OXYGEN SATURATION: 90 % | DIASTOLIC BLOOD PRESSURE: 80 MMHG | WEIGHT: 178.2 LBS | SYSTOLIC BLOOD PRESSURE: 126 MMHG | TEMPERATURE: 96.9 F | HEIGHT: 66 IN | HEART RATE: 64 BPM

## 2021-04-08 DIAGNOSIS — G89.29 NECK PAIN, CHRONIC: ICD-10-CM

## 2021-04-08 DIAGNOSIS — E11.51 DM (DIABETES MELLITUS) TYPE II, CONTROLLED, WITH PERIPHERAL VASCULAR DISORDER (HCC): ICD-10-CM

## 2021-04-08 DIAGNOSIS — M54.2 NECK PAIN, CHRONIC: ICD-10-CM

## 2021-04-08 DIAGNOSIS — K21.9 GASTROESOPHAGEAL REFLUX DISEASE WITHOUT ESOPHAGITIS: ICD-10-CM

## 2021-04-08 DIAGNOSIS — E78.5 HYPERLIPIDEMIA LDL GOAL <100: ICD-10-CM

## 2021-04-08 DIAGNOSIS — I10 ESSENTIAL HYPERTENSION, BENIGN: ICD-10-CM

## 2021-04-08 DIAGNOSIS — H92.03 ACUTE EAR PAIN, BILATERAL: ICD-10-CM

## 2021-04-08 DIAGNOSIS — H61.23 BILATERAL IMPACTED CERUMEN: ICD-10-CM

## 2021-04-08 DIAGNOSIS — J43.8 OTHER EMPHYSEMA (HCC): ICD-10-CM

## 2021-04-08 DIAGNOSIS — H81.13 BENIGN PAROXYSMAL POSITIONAL VERTIGO DUE TO BILATERAL VESTIBULAR DISORDER: ICD-10-CM

## 2021-04-08 PROCEDURE — 99214 OFFICE O/P EST MOD 30 MIN: CPT | Performed by: NURSE PRACTITIONER

## 2021-04-08 RX ORDER — LOSARTAN POTASSIUM AND HYDROCHLOROTHIAZIDE 12.5; 5 MG/1; MG/1
1 TABLET ORAL
Qty: 90 TABLET | Refills: 3 | Status: SHIPPED | OUTPATIENT
Start: 2021-04-08 | End: 2021-10-11 | Stop reason: SDUPTHER

## 2021-04-08 RX ORDER — FLUTICASONE PROPIONATE 50 MCG
2 SPRAY, SUSPENSION (ML) NASAL DAILY
Qty: 9.9 ML | Refills: 6 | Status: SHIPPED | OUTPATIENT
Start: 2021-04-08 | End: 2023-04-26 | Stop reason: SDUPTHER

## 2021-04-08 RX ORDER — RABEPRAZOLE SODIUM 20 MG/1
20 TABLET, DELAYED RELEASE ORAL DAILY
Qty: 90 TABLET | Refills: 3 | Status: SHIPPED | OUTPATIENT
Start: 2021-04-08 | End: 2021-10-11 | Stop reason: SDUPTHER

## 2021-04-08 RX ORDER — FENOFIBRATE 48 MG/1
48 TABLET, COATED ORAL
Qty: 36 TABLET | Refills: 3 | Status: SHIPPED | OUTPATIENT
Start: 2021-04-08 | End: 2021-04-12 | Stop reason: SDUPTHER

## 2021-04-08 RX ORDER — FELODIPINE 2.5 MG/1
2.5 TABLET, EXTENDED RELEASE ORAL
Qty: 90 TABLET | Refills: 3 | Status: SHIPPED | OUTPATIENT
Start: 2021-04-08 | End: 2021-10-11 | Stop reason: SDUPTHER

## 2021-04-08 RX ORDER — FELODIPINE 10 MG/1
10 TABLET, EXTENDED RELEASE ORAL
Qty: 90 TABLET | Refills: 3 | Status: SHIPPED | OUTPATIENT
Start: 2021-04-08 | End: 2021-10-11 | Stop reason: SDUPTHER

## 2021-04-08 RX ORDER — ALBUTEROL SULFATE 90 UG/1
2 AEROSOL, METERED RESPIRATORY (INHALATION) EVERY 6 HOURS PRN
Qty: 3 EACH | Refills: 3 | Status: SHIPPED | OUTPATIENT
Start: 2021-04-08 | End: 2022-09-01 | Stop reason: SDUPTHER

## 2021-04-08 ASSESSMENT — PATIENT HEALTH QUESTIONNAIRE - PHQ9: CLINICAL INTERPRETATION OF PHQ2 SCORE: 0

## 2021-04-08 NOTE — PROGRESS NOTES
Subjective:     Kerri Newell is a 85 y.o. female who presents with vertigo/BPPV.    HPI:   Seen in f/u for vertigo/BPPV.  She restarted having sx again last month.  Now still occurring several times during day.  It was also there 2 yrs but this time is worse.  She needs to go to PT.  She is having vomiting this time.  She was in bed for almost 2 weeks with sx.  Needs PT referral.    She is stable on her meds for DM.  Taking metformin and januvia.  No s/e to meds  She is on tricor for her elevated trg.  She is not limiting carbs in diet.  She is limited in her ability to walk d/t her COPD.  She is followed by pulm for that.  She also needs her dm and COPD meds refilled.  Taking meds approp.  Her bp is stable and well controlled on meds.  Needs hyzaar and plendils refilled.  She has occas breakthru with her GERD at midnite-0100.  She continues to take the achphex regularly.  Needs refills.  She tries to eat early and bland but doesn't always.    Reviewed lab with pt. Her alb/cr ratio, CMP, D is wnl except glucose elevated.   a1c is stable at 6.6.  Taking meds approp  GFR is chronically low at 44.  She does not drink enough water.  LP shows trg are up from 261 to 309. This is despite taking tricor 2x/wk.  Will increase.  HDL is low at 41.  That is down from 44.  LDL is at goal at 95.  Goal is <100.    She continues to have chronic neck pain.  Xray in the past has shown DDD/OA.  She has had improvement with PT in past.  Will redo.   She is having episodes of ear pain for ? Reason.  No dg.  Chronic poor hearing.  + sinus pressure      Patient Active Problem List    Diagnosis Date Noted   • Complete heart block (HCC)    • CKD (chronic kidney disease) stage 3, GFR 30-59 ml/min    • DM (diabetes mellitus) type II, controlled, with peripheral vascular disorder (HCC) 09/16/2018   • Age-related cataract of both eyes    • Vitamin D deficiency    • Esophageal stricture    • Diverticulosis    • Osteopenia of multiple  sites    • Hiatal hernia    • Arthritis    • Glaucoma    • Essential hypertension, benign 09/04/2012   • COPD (chronic obstructive pulmonary disease) ([facility]) 04/05/2012   • Hyperlipidemia LDL goal <100 07/13/2010   • Preventative health care 07/24/2009   • GERD (gastroesophageal reflux disease) 07/24/2009       Current medicines (including changes today)  Current Outpatient Medications   Medication Sig Dispense Refill   • albuterol (PROAIR HFA) 108 (90 Base) MCG/ACT Aero Soln inhalation aerosol Inhale 2 Puffs every 6 hours as needed for Shortness of Breath. 3 Each 3   • felodipine (PLENDIL) 10 MG TABLET SR 24 HR Take 1 tablet by mouth every day. TAKE 1 TAB BY MOUTH EVERY DAY. 90 tablet 3   • felodipine (PLENDIL) 2.5 MG TABLET SR 24 HR Take 1 tablet by mouth every day. 90 tablet 3   • fenofibrate (TRICOR) 48 MG Tab Take 1 tablet by mouth every 48 hours. TAKE 1 TABLET BY MOUTH EVERY 72 HOURS 36 tablet 3   • fluticasone (FLONASE) 50 MCG/ACT nasal spray Administer 2 Sprays into affected nostril(S) every day. 9.9 mL 6   • metFORMIN (GLUCOPHAGE) 500 MG Tab Take 1 tablet by mouth 2 times a day with meals. 180 tablet 3   • losartan-hydrochlorothiazide (HYZAAR) 50-12.5 MG per tablet Take 1 tablet by mouth every day. 90 tablet 3   • rabeprazole (ACIPHEX) 20 MG tablet Take 1 tablet by mouth every day. Take on empty stomach in AM 90 tablet 3   • salmeterol (SEREVENT) 50 MCG/DOSE AEROSOL POWDER, BREATH ACTIVATED Inhale 1 Puff 2 times a day. 90 Each 3   • SITagliptin (JANUVIA) 100 MG Tab Take 1 tablet by mouth every day. 90 tablet 1   • amoxicillin (AMOXIL) 500 MG Cap TAKE 4 CAPSULES BY MOUTH 1 TIME FOR 1 DOSE 4 Cap 1   • Glucosamine HCl (GLUCOSAMINE PO) Take  by mouth.     • Cholecalciferol (VITAMIN D3) 2000 UNIT Cap Take  by mouth.     • acetaminophen (TYLENOL) 325 MG Tab Take 650 mg by mouth every four hours as needed.     • Cyanocobalamin (B-12) 2500 MCG Tab Take  by mouth.       No current facility-administered medications for  "this visit.       Allergies   Allergen Reactions   • Stiolto Respimat [Tiotropium Bromide-Olodaterol]      Cannot urinate   • Sulfa Drugs        ROS  Constitutional: Negative. Negative for fever, chills, weight loss, malaise/fatigue and diaphoresis.   HENT: Negative. Negative for hearing loss, ear pain, nosebleeds, congestion, sore throat, neck pain, tinnitus and ear discharge.   Respiratory: Negative. Negative for cough, hemoptysis, sputum production, wheezing and stridor.   Cardiovascular: Negative. Negative for chest pain, palpitations, orthopnea, claudication, leg swelling and PND.   Gastrointestinal: Denies nausea, vomiting, diarrhea, constipation, heartburn, melena or hematochezia.  Genitourinary: Denies dysuria, hematuria, urinary incontinence, frequency or urgency.        Objective:     /80 (BP Location: Right arm, Patient Position: Sitting)   Pulse 64   Temp 36.1 °C (96.9 °F) (Temporal)   Ht 1.676 m (5' 6\")   Wt 80.8 kg (178 lb 3.2 oz)   SpO2 90%  Body mass index is 28.76 kg/m².    Physical Exam:  Physical Exam   Vitals reviewed.  Constitutional: oriented to person, place, and time. appears well-developed and well-nourished. No distress.   HENT:  Head: Normocephalic and atraumatic. Right Ear: External ear normal. Left Ear: External ear normal. Nose: Nose normal. Mouth/Throat: Oropharynx is clear and moist. No oropharyngeal exudate.  micky tm occluded with ceruemn.  Eyes: Right eye exhibits no discharge. Left eye exhibits no discharge. No scleral icterus.  Neck: No JVD present.  Cardiovascular: Normal rate, regular rhythm, normal heart sounds and intact distal pulses.  Exam reveals no gallop and no friction rub.  No murmur heard.  No carotid bruits.   Pulmonary/Chest: Effort normal and breath sounds normal. No stridor. No respiratory distress. no wheezes or rales. exhibits no tenderness.   Musculoskeletal: Normal range of motion. exhibits no edema. micky pedal pulses 2+.  Lymphadenopathy: no cervical " or supraclavicular adenopathy.   Neurological: alert and oriented to person, place, and time. exhibits normal muscle tone. Coordination normal.   Skin: Skin is warm and dry. no diaphoresis.   Psychiatric: normal mood and affect. behavior is normal.        Assessment and Plan:     The following treatment plan was discussed:    1. Benign paroxysmal positional vertigo due to bilateral vestibular disorder  REFERRAL TO PHYSICAL THERAPY    refer for PT for epley maneuver   2. Other emphysema (HCC)  albuterol (PROAIR HFA) 108 (90 Base) MCG/ACT Aero Soln inhalation aerosol    fluticasone (FLONASE) 50 MCG/ACT nasal spray    salmeterol (SEREVENT) 50 MCG/DOSE AEROSOL POWDER, BREATH ACTIVATED    refilled meds; stable on meds.  followed by pulm   3. Essential hypertension, benign  felodipine (PLENDIL) 10 MG TABLET SR 24 HR    felodipine (PLENDIL) 2.5 MG TABLET SR 24 HR    losartan-hydrochlorothiazide (HYZAAR) 50-12.5 MG per tablet    controlled.  refilled meds.     4. Hyperlipidemia LDL goal <100  fenofibrate (TRICOR) 48 MG Tab    mtrg not controlled.  enc pt to dec carbs in diet.  inc tricor to 3x/wk.  f/u 6 mo.  call for lab lsip   5. DM (diabetes mellitus) type II, controlled, with peripheral vascular disorder (HCC)  metFORMIN (GLUCOPHAGE) 500 MG Tab    SITagliptin (JANUVIA) 100 MG Tab    Diabetic Monofilament LE Exam    A1C stable on meds. reinforce low carb diet.  inc exercise as able.  repeat lab in 6m then f/u for review.  call for lab slip   6. Gastroesophageal reflux disease without esophagitis  rabeprazole (ACIPHEX) 20 MG tablet    stable; refilled all meds.     7. Neck pain, chronic  REFERRAL TO PHYSICAL THERAPY    refer PT for therapy.  pain is chronic   8. Acute ear pain, bilateral      f/u for ear irrigation when recover from vertigo   9. Bilateral impacted cerumen      she will wait on getting irrigation until her vertigo is better. will f/u for irrigation when improved         Followup: Return in about 6 months  (around 10/8/2021).

## 2021-04-12 DIAGNOSIS — E78.5 HYPERLIPIDEMIA LDL GOAL <100: ICD-10-CM

## 2021-04-12 RX ORDER — FENOFIBRATE 48 MG/1
48 TABLET, COATED ORAL
Qty: 36 TABLET | Refills: 3 | Status: SHIPPED | OUTPATIENT
Start: 2021-04-12 | End: 2021-10-11 | Stop reason: SDUPTHER

## 2021-05-24 ENCOUNTER — PHYSICAL THERAPY (OUTPATIENT)
Dept: PHYSICAL THERAPY | Facility: REHABILITATION | Age: 86
End: 2021-05-24
Attending: NURSE PRACTITIONER
Payer: MEDICARE

## 2021-05-24 DIAGNOSIS — H81.13 BPPV (BENIGN PAROXYSMAL POSITIONAL VERTIGO), BILATERAL: ICD-10-CM

## 2021-05-24 DIAGNOSIS — H81.13 BENIGN PAROXYSMAL POSITIONAL VERTIGO DUE TO BILATERAL VESTIBULAR DISORDER: ICD-10-CM

## 2021-05-24 PROCEDURE — 95992 CANALITH REPOSITIONING PROC: CPT

## 2021-05-24 PROCEDURE — 97161 PT EVAL LOW COMPLEX 20 MIN: CPT

## 2021-05-24 ASSESSMENT — ENCOUNTER SYMPTOMS
PAIN SCALE AT HIGHEST: 8
PAIN SCALE AT LOWEST: 0
PAIN SCALE: 0

## 2021-05-24 NOTE — OP THERAPY EVALUATION
Outpatient Physical Therapy  VESTIBULAR EVALUATION    [address]  [address]  [place] [address]  Phone:  [phone]  Fax:  [phone]    Date of Evaluation: 2021    Patient: Kerri Newell  YOB: 1935  MRN: [id number]     Referring Provider: ALISON Santacruz  [address]  [address]  [place] [address]   Referring Diagnosis: Benign paroxysmal positional vertigo due to bilateral vestibular disorder [H81.13]     Time Calculation    Start time: 1008  Stop time: 1059 Time Calculation (min): 51 minutes           Chief Complaint: Vertigo    Visit Diagnoses     ICD-10-CM   1. Benign paroxysmal positional vertigo due to bilateral vestibular disorder  H81.13   2. BPPV (benign paroxysmal positional vertigo), bilateral  H81.13         History of Present Illness:     Mechanism of injury:    Pt returns to PT with complaint of relapse in her intermittent bursts of positionally related vertigo.  She was seen by this clinic in 2019 for R posterior canalithasis type BPPV and showed good success with CRM.  States this episode started in the middle of the night about 3 months ago.  The intensity of the vertigo was more intense and did cause extreme instability and vomiting.  Notes it is worst when she rolls R.  Tried to do a home CRM, but did not improve it.  Again noticed she was most symptomatic with the R ear down.  No sx if she holds her head steady.  The intensity has let up, but persists especially with bed mobility. Also complains of neck pain/stiffness.     Prior level of function:  Retired, but active. Drives    Sleep disturbance:  Interrupted sleep  Symptoms:     Current symptom ratin    At best symptom ratin    At worst symptom ratin    Progression:  Stable  Social Support:     Lives with:  Spouse  Treatments:     Previous treatment:  Physical therapy  Patient goals:     Other patient goals:  Resovle vertigo      Past  Medical History:   Diagnosis Date   • Arthritis     osteoarthritis   • CATARACT    • CKD (chronic kidney disease) stage 3, GFR 30-59 ml/min    • Complete heart block (HCC)     PMA placed on 18 at [facility] by Dr Scotty Arias   • COPD (chronic obstructive pulmonary disease) (HCC)    • Diverticulosis    • DM (diabetes mellitus) (HCC)    • Dyslipidemia    • Esophageal stricture     with dilation   • GERD (gastroesophageal reflux disease) 2009   • Glaucoma    • Hiatal hernia    • HTN (hypertension)    • Osteopenia    • Vitamin D deficiency      Past Surgical History:   Procedure Laterality Date   • TONSILLECTOMY AND ADENOIDECTOMY       Social History     Tobacco Use   • Smoking status: Former Smoker     Packs/day: 2.00     Years: 56.00     Pack years: 112.00     Types: Cigarettes     Quit date: 1991     Years since quittin.1   • Smokeless tobacco: Never Used   Substance Use Topics   • Alcohol use: Yes     Alcohol/week: 0.0 oz     Comment: rare     Family and Occupational History     Socioeconomic History   • Marital status:      Spouse name: Not on file   • Number of children: Not on file   • Years of education: Not on file   • Highest education level: Not on file   Occupational History   • Not on file       Objective:    Gait:     Assessment: difficulty with concurrent head rotation  Vestibulospinal Exam:     Comments: deferred  Oculomotor Exam:         Details: No spontaneous nystagmus central gaze          Details: No spontaneous nystagmus eccentric gaze    No saccadic eye movements    Smooth pursuit present    Convergence:        Normal convergence  Active Range of Motion:     Active range of motion comments: C/S AROM is painful and stiff (does have a referral for tx of this at future date). Ext= 30 deg, rotation = 60 deg bilat  Limb Ataxia Exam:     Finger-to-Nose:         Intention tremor: none    Dysdiadochokinesia: none.  Strength Exam:     Upper extremities within  functional limits    Lower extremities within functional limits  Reflex Exam:       Deep Tendon Reflexes:         Left L4 (Patellar): normal (2+)        Right L4 (Patellar): normal (2+)  Vertebrobasilar Exam:    Comments: NEG seated active test bilat  Vestibulo-Ocular Exam:   Normal head thrust test    Comments: VORx1 slow is symptomatic  BPPV Exam:     Abnormal Fani-Hallpike        Latency (sec): 2        Duration (sec): 14        Findings: positive right, fatigable and torsional nystagmus        Therapeutic Treatments and Modalities:     1. Canalith Repositioning (CPT 60506), R epley completed x 2. Nystagmus resolved on 2nd attempt. Reivew home pxns and HO provided.     Time-based treatments/modalities:             Assessment:     Functional impairments:  Positive BPPV (right), decreased gaze stabilization, decreased postural stability, decreased limits of stability, gait abnormality/instability, decreased utilization of VIS/vest/somato, decreased range of motion/strength and pain    Assessment details:    Ms. Posadas is an 86 y.o female who presents to PT with relapse in her positional vertigo x 3 months.  PT evaluation is consistent with R posterior canalithasis type BPPV.  The problem limits her tolerance for quick transitions and head movements.  She complains of frequent LOB and blurred vision. Skilled PT services are indicated to address the mentioned functional limitations and enhance QOL.       Prognosis: good    Goals:     Short term goals:  - Resolve nystagmus in R hallpike  - Indep with home CRM  - Complete MCTSIB    Short term goal time span:  1-2 weeks    Long term goals:  - Balance measures indicate low risk for falls.  - Improve DHI to less than 10%  Plan:     Therapy options:  Physical therapy treatment to continue    Planned therapy interventions:  Canalith Repositioning (CPT 87884), Therapeutic Activities (CPT 64754), Therapeutic Exercise (CPT 34693), Functional Training, Self Care (CPT 60899) and  Neuromuscular Re-education (CPT 58327)    Frequency:  2x week    Duration in weeks:  8    Discussed with:  Patient      Functional Assessment Used    DHI= 38%      Referring provider co-signature:  I have reviewed this plan of care and my co-signature certifies the need for services.    Certification Period: 05/24/2021 to  07/19/21    Physician Signature: ________________________________ Date: ______________

## 2021-05-26 ENCOUNTER — APPOINTMENT (OUTPATIENT)
Dept: PHYSICAL THERAPY | Facility: REHABILITATION | Age: 86
End: 2021-05-26
Attending: NURSE PRACTITIONER
Payer: MEDICARE

## 2021-05-28 ENCOUNTER — PHYSICAL THERAPY (OUTPATIENT)
Dept: PHYSICAL THERAPY | Facility: REHABILITATION | Age: 86
End: 2021-05-28
Attending: NURSE PRACTITIONER
Payer: MEDICARE

## 2021-05-28 DIAGNOSIS — H81.13 BENIGN PAROXYSMAL POSITIONAL VERTIGO DUE TO BILATERAL VESTIBULAR DISORDER: ICD-10-CM

## 2021-05-28 PROCEDURE — 97112 NEUROMUSCULAR REEDUCATION: CPT

## 2021-05-28 NOTE — OP THERAPY DAILY TREATMENT
Outpatient Physical Therapy  DAILY TREATMENT     [facility] Physical Therapy [address]  [address]  [place] [address]  Phone:  [phone]  Fax:  [phone]    Date: 05/28/2021    Patient: Kerri Newell  YOB: 1935  MRN: [id number]     Time Calculation    Start time: 0941  Stop time: 1005 Time Calculation (min): 24 minutes         Chief Complaint: Vertigo    Visit #: 2    SUBJECTIVE:  I haven't had any spins since I was here last.    OBJECTIVE:      Therapeutic Treatments and Modalities:     1. Neuromuscular Re-education (CPT 89334)    Therapeutic Treatment and Modalities Summary:   - Reassessment positional exam: NEG for hallpike, roll and hang  - Review of home CRM  - When to self tx or seek tx with PT  - Ok to d/c pxns at this time.     Time-based treatments/modalities:    Physical Therapy Timed Treatment Charges  Neuromusc re-ed, balance, coor, post minutes (CPT 78238): 24 minutes    ASSESSMENT:   Response to treatment: BPPV appears to be resolved.  Pt ok to d/c pxns.  Return for care only if the vertigo relapses, otherwise ok to d/c after 30 days.     PLAN/RECOMMENDATIONS:   Plan for treatment: therapy treatment to continue next visit.  Planned interventions for next visit: continue with current treatment.

## 2021-07-01 ENCOUNTER — TELEPHONE (OUTPATIENT)
Dept: PHYSICAL THERAPY | Facility: REHABILITATION | Age: 86
End: 2021-07-01

## 2021-07-01 ENCOUNTER — PHYSICAL THERAPY (OUTPATIENT)
Dept: PHYSICAL THERAPY | Facility: REHABILITATION | Age: 86
End: 2021-07-01
Attending: NURSE PRACTITIONER
Payer: MEDICARE

## 2021-07-01 DIAGNOSIS — R26.2 DIFFICULTY WALKING: ICD-10-CM

## 2021-07-01 DIAGNOSIS — G89.29 CHRONIC MIDLINE LOW BACK PAIN WITHOUT SCIATICA: ICD-10-CM

## 2021-07-01 DIAGNOSIS — M54.2 NECK PAIN, CHRONIC: ICD-10-CM

## 2021-07-01 DIAGNOSIS — G89.29 NECK PAIN, CHRONIC: ICD-10-CM

## 2021-07-01 DIAGNOSIS — M54.50 CHRONIC MIDLINE LOW BACK PAIN WITHOUT SCIATICA: ICD-10-CM

## 2021-07-01 PROCEDURE — 97110 THERAPEUTIC EXERCISES: CPT

## 2021-07-01 ASSESSMENT — ENCOUNTER SYMPTOMS
PAIN SCALE AT HIGHEST: 9
PAIN SCALE: 6
PAIN SCALE AT LOWEST: 3

## 2021-07-01 NOTE — OP THERAPY DAILY TREATMENT
Outpatient Physical Therapy  DAILY TREATMENT     [facility] Physical Therapy [address]  [address]  [place] [address]  Phone:  [phone]  Fax:  [phone]    Date: 07/01/2021    Patient: Kerri Newell  YOB: 1935  MRN: [id number]     Time Calculation                   Chief Complaint: No chief complaint on file.    Visit #: 3    SUBJECTIVE:  ***    OBJECTIVE:      Therapeutic Treatments and Modalities:     1. Neuromuscular Re-education (CPT 93090)    Therapeutic Treatment and Modalities Summary:   - Reassessment positional exam: NEG for hallpike, roll and hang  - Review of home CRM  - When to self tx or seek tx with PT  - Ok to d/c pxns at this time.     Time-based treatments/modalities:         ASSESSMENT:   Response to treatment: ***    PLAN/RECOMMENDATIONS:   Plan for treatment: therapy treatment to continue next visit.  Planned interventions for next visit: continue with current treatment.

## 2021-07-01 NOTE — OP THERAPY DISCHARGE SUMMARY
Outpatient Physical Therapy  DISCHARGE SUMMARY NOTE      [facility] Physical Therapy [address]  [address]  [place] [address]  Phone:  [phone]  Fax:  [phone]    Date of Visit: 07/01/2021    Patient: Kerri Newell  YOB: 1935  MRN: [id number]     Referring Provider: No referring provider defined for this encounter.   Referring Diagnosis No admission diagnoses are documented for this encounter.         Functional Assessment Used        Your patient is being discharged from Physical Therapy with the following comments:   · Goals met    Comments:  Ms. Newell was seen for 2 PT sessions treating her dizziness.  Sx consistent with R posterior canalithasis type BPPV and treated with the indicated CRM with good success.  After no need to return for relapse in over 30 days, pt is appropriate to d/c at this time. Welcomed to return with new Rx if indicated.     Marilu Dumont, PT, DPT    Date: 7/1/2021

## 2021-07-01 NOTE — OP THERAPY EVALUATION
Outpatient Physical Therapy  INITIAL EVALUATION    [facility] Physical Therapy [address].  [address]  [place] [address]  Phone:  [phone]  Fax:  [phone]    Date of Evaluation: 2021    Patient: Kerri Newell  YOB: 1935  MRN: [id number]     Referring Provider: ALISON Santacruz  [address]  [address]  [place] [address]   Referring Diagnosis Neck pain, chronic [M54.2, G89.29]     Time Calculation  Start time: 1315  Stop time: 1405 Time Calculation (min): 50 minutes         Chief Complaint: Neck Problem    Visit Diagnoses     ICD-10-CM   1. Neck pain, chronic  M54.2    G89.29   2. Chronic midline low back pain without sciatica  M54.5    G89.29   3. Difficulty walking  R26.2       Date of onset of impairment: 2021    Subjective:   History of Present Illness:     Mechanism of injury:    Ms. Newell presents to PT with complaint of neck and lower back pain.  The neck pain has been persistent over about 4 months.  She was seen previously for BPPV, which corrected quickly with CRM.  She states the neck pain did reduce after the vertigo resolved- overall about 40% improved.  Still having to be cautious with how much she moves it.  Uses HP twice a week (down from daily) and 2-3 tylenol every 6 hours to help with the pain. More acutely, she is experiencing LBP, which came on insidiously 3 days ago.  She is taking the tylenol more so to manage this.  Denies N/T in the LEs.    Prior level of function:  Retired. Active around her home.   Sleep disturbance:  Interrupted sleep (by difficulty breathing, not related to this problem. )  Pain:     Current pain ratin    At best pain rating:  3 (lying down)    At worst pain ratin    Alleviating factors: Lying down, using heat and tylenol.     Exacerbated by: Neck: turning too quickly, prolonged reading/looking down (longer than 10-15 min). Back: worse with STS and walking.    Progression:   Worsening  Social Support:     Lives in:  One-story house    Lives with:  Spouse  Treatments:     Previous treatment:  Physical therapy  Patient Goals:     Patient goals for therapy:  Decreased pain, increased strength, increased motion, return to sport/leisure activities and independence with ADLs/IADLs      Past Medical History:   Diagnosis Date   • Arthritis     osteoarthritis   • CATARACT    • CKD (chronic kidney disease) stage 3, GFR 30-59 ml/min    • Complete heart block (HCC)     PMA placed on 18 at [facility] by Dr Scotty Arias   • COPD (chronic obstructive pulmonary disease) (HCC)    • Diverticulosis    • DM (diabetes mellitus) (HCC)    • Dyslipidemia    • Esophageal stricture     with dilation   • GERD (gastroesophageal reflux disease) 2009   • Glaucoma    • Hiatal hernia    • HTN (hypertension)    • Osteopenia    • Vitamin D deficiency      Past Surgical History:   Procedure Laterality Date   • TONSILLECTOMY AND ADENOIDECTOMY       Social History     Tobacco Use   • Smoking status: Former Smoker     Packs/day: 2.00     Years: 56.00     Pack years: 112.00     Types: Cigarettes     Quit date: 1991     Years since quittin.2   • Smokeless tobacco: Never Used   Substance Use Topics   • Alcohol use: Yes     Alcohol/week: 0.0 oz     Comment: rare     Family and Occupational History     Socioeconomic History   • Marital status:      Spouse name: Not on file   • Number of children: Not on file   • Years of education: Not on file   • Highest education level: Not on file   Occupational History   • Not on file       Objective     Postural Observations    Additional Postural Observation Details  Fwd head, rounded shoulders. Shallow chest/shoulder breathing.     Shoulder Screen    Shoulder active range of motion within functional limits.  Hip Screen   Hip range of motion within functional limits.    Neurological Testing     Reflexes   Left   Biceps (C5/C6): normal  (2+)  Brachioradialis (C6): normal (2+)  Patellar (L4): normal (2+)    Right   Biceps (C5/C6): normal (2+)  Brachioradialis (C6): normal (2+)  Patellar (L4): normal (2+)    Myotome testing   Cervical (left)   All left cervical myotomes within normal limits    Cervical (right)   All right cervical myotomes within normal limits  Lumbar (left)   All left lumbar myotomes within normal limits    Lumbar (right)   All right lumbar myotomes within normal limits    Dermatome testing   Cervical (left)   All left cervical dermatomes intact    Cervical (right)   All right cervical dermatomes intact  Lumbar (left)   All left lumbar dermatomes intact    Lumbar (right)   All right lumbar dermatomes intact    Palpation   Left   Tenderness of the cervical paraspinals, levator scapulae, thoracic paraspinals and upper trapezius.     Right   Tenderness of the cervical paraspinals, levator scapulae, thoracic paraspinals and upper trapezius.     Active Range of Motion     Cervical Spine   Flexion: decreased (pulls into L lumbar)  Extension: decreased (pulls into L lumbar)  Retraction: within functional limits  Left lateral flexion: within functional limits  Right lateral flexion: within functional limits  Left rotation: within functional limits  Right rotation: within functional limits    Lumbar   Flexion: within functional limits (but painful return. Having to walk hands up the LEs)  Extension: within functional limits ('this actually feels good')  Left lateral flexion: decreased (75% limit, sharp L sided pain)  Right lateral flexion: within functional limits  Left rotation: within functional limits  Right rotation: decreased (25% limit, L sided pain)    Joint Play   Spine     Central PA [place]        C5: hypomobile and painful       C6: hypomobile and painful       C7: hypomobile and painful    1st Rib Caudal Glide        Left: hypomobile and painful       Right: hypomobile and painful        Strength:      Upper extremities   Left  upper extremity strength within functional limits  Right upper extremity strength within functional limits    Abdominals   Lower abdominals: Unable to initiate contraction (holds breath)    Lower extremities   Normal left lower extremity strength  Normal right lower extremity strength    Tests   Cervical spine   Positive cervical spine distraction.    Left Shoulder   Negative Spurling's sign, ULTT2, ULTT3 and ULTT4.     Right Shoulder   Negative Spurling's sign, ULTT2, ULTT3 and ULTT4.     Left Hip   SLR: Negative.     Right Hip   SLR: Negative.         Therapeutic Exercises (CPT 26438):       Therapeutic Exercise Summary: Access Code: [email or web address]  URL: [email or web address]  Date: 07/01/2021  Prepared by: Marilu Dumont    Exercises  Supine Posterior Pelvic Tilt - 3 x daily - 30 reps  Supine Lower Trunk Rotation - 3 x daily - 30 reps - 5 sec hold  Supine Piriformis Stretch with Foot on Ground - 3 x daily - 1 reps - 60 sec hold    Reviewed diaphragm breathing x 5 min in supine and seated.         Time-based treatments/modalities:    Physical Therapy Timed Treatment Charges  Therapeutic exercise minutes (CPT 94792): 15 minutes      Assessment, Response and Plan:   Impairments: abnormal gait, abnormal or restricted ROM, activity intolerance, difficulty performing job, impaired functional mobility, impaired physical strength, lacks appropriate home exercise program and limited ADL's    Assessment details:    Ms. Newell is an 86 y.o female who presents to PT with complaint of neck (chronic) and lower back pain (subacute).  PT evaluation of both reveals sx consistent with spinal DJD.  She is most sensitive with closing motions through the L L3-5 levels, resulting in limited L lateral flexion, poor return from fwd flexion, inability to brace and bridge to any amount due to the intensity of pain.  Neuro signs are NEG except for central tension with c/s AROM referring to L lumbar.  The problems interfere with her  functional mobility and activity tolerance.  Pt is spending much of the day in bed to manage the pain. Skilled PT services are indicated to address the mentioned functional limitations and enhance QOL.     Goals:   Short Term Goals:   - Able to return from standing flexion without UEs  - Able to bridge to full height  - C/S AROM without lumbar referral  Short term goal time span:  1-2 weeks      Long Term Goals:    - Improve NDI at least 12%  - Pt reports completing her IADLs with no more than 2/10 pain  - Indep with HEP  Long term goal time span:  6-8 weeks    Plan:   Therapy options:  Physical therapy treatment to continue  Planned therapy interventions:  E Stim Unattended (CPT 07223), Functional Training, Self Care (CPT 92956), Hot or Cold Pack Therapy (CPT 86464), Therapeutic Activities (CPT 35051), Therapeutic Exercise (CPT 47768), Manual Therapy (CPT 58656), Neuromuscular Re-education (CPT 20464) and Mechanical Traction (CPT 74132)  Frequency: 1-2x/week.  Discussed with:  Patient      Functional Assessment Used    NDI= 42%    Referring provider co-signature:  I have reviewed this plan of care and my co-signature certifies the need for services.    Certification Period: 07/01/2021 to  08/26/21    Physician Signature: ________________________________ Date: ______________

## 2021-07-07 ENCOUNTER — PHYSICAL THERAPY (OUTPATIENT)
Dept: PHYSICAL THERAPY | Facility: REHABILITATION | Age: 86
End: 2021-07-07
Attending: NURSE PRACTITIONER
Payer: MEDICARE

## 2021-07-07 DIAGNOSIS — G89.29 NECK PAIN, CHRONIC: ICD-10-CM

## 2021-07-07 DIAGNOSIS — G89.29 CHRONIC MIDLINE LOW BACK PAIN WITHOUT SCIATICA: ICD-10-CM

## 2021-07-07 DIAGNOSIS — M54.2 NECK PAIN, CHRONIC: ICD-10-CM

## 2021-07-07 DIAGNOSIS — M54.50 CHRONIC MIDLINE LOW BACK PAIN WITHOUT SCIATICA: ICD-10-CM

## 2021-07-07 PROCEDURE — 97140 MANUAL THERAPY 1/> REGIONS: CPT

## 2021-07-07 PROCEDURE — 97110 THERAPEUTIC EXERCISES: CPT

## 2021-07-07 NOTE — OP THERAPY DAILY TREATMENT
Outpatient Physical Therapy  DAILY TREATMENT     [facility] Physical Therapy [address].  [address]  [place] [address]  Phone:  [phone]  Fax:  [phone]    Date: 07/07/2021    Patient: Kerri Newell  YOB: 1935  MRN: [id number]     Time Calculation    Start time: 1020  Stop time: 1100 Time Calculation (min): 40 minutes         Chief Complaint: Neck Problem and Back Problem    Visit #: 2    SUBJECTIVE:  My back is much better after doing the exercises. Had to modify the rep scheme some, but at least 50% improved.  Worse with the STS transition and walking. My neck is the bigger concern.     OBJECTIVE:      Therapeutic Exercises (CPT 68943):     1. Supine c/s rotation, x 15 ea    2. Supine OA nod, x 15    3. SL t/s rotation (openers), x 10 ea    4. Seated c/s rotation in cervical flexion, x 15 ea    5. Bridge, x 15, lateral sway, difficulty raising the R as high as the L, poor breath control    6. Diaphragm breathing, supine-> seated x 10 min total of practice    Therapeutic Treatments and Modalities:     1. Manual Therapy (CPT 11475), Supine: CTJ rotational mobs, B R1 mobs GIII, DTW to B UTs/LS. SOR    Time-based treatments/modalities:    Physical Therapy Timed Treatment Charges  Manual therapy minutes (CPT 47633): 10 minutes  Therapeutic exercise minutes (CPT 37228): 30 minutes      ASSESSMENT:   Response to treatment: Decreased irritability of lumbar allowing full height bridging.  Limited endurance and poor lateral control.  C/S restricted mainly in the L CTJ.  Shallow breath pattern seems to be contributing.  Able to correct with practice and cuing.  Diaphragm breathing added to HEP.     PLAN/RECOMMENDATIONS:   Plan for treatment: therapy treatment to continue next visit.  Planned interventions for next visit: continue with current treatment.

## 2021-07-09 ENCOUNTER — PHYSICAL THERAPY (OUTPATIENT)
Dept: PHYSICAL THERAPY | Facility: REHABILITATION | Age: 86
End: 2021-07-09
Attending: NURSE PRACTITIONER
Payer: MEDICARE

## 2021-07-09 DIAGNOSIS — G89.29 CHRONIC MIDLINE LOW BACK PAIN WITHOUT SCIATICA: ICD-10-CM

## 2021-07-09 DIAGNOSIS — M54.2 NECK PAIN, CHRONIC: ICD-10-CM

## 2021-07-09 DIAGNOSIS — M54.50 CHRONIC MIDLINE LOW BACK PAIN WITHOUT SCIATICA: ICD-10-CM

## 2021-07-09 DIAGNOSIS — G89.29 NECK PAIN, CHRONIC: ICD-10-CM

## 2021-07-09 PROCEDURE — 97110 THERAPEUTIC EXERCISES: CPT

## 2021-07-09 NOTE — OP THERAPY DAILY TREATMENT
"  Outpatient Physical Therapy  DAILY TREATMENT     [facility] Physical Therapy [place] [address]  [address]  [place] [address]  Phone:  [phone]  Fax:  [phone]    Date: 07/09/2021    Patient: Kerri Newell  YOB: 1935  MRN: [id number]     Time Calculation    Start time: 1018  Stop time: 1100 Time Calculation (min): 42 minutes         Chief Complaint: Neck Problem and Back Problem    Visit #: 3    SUBJECTIVE:  I was really sore in the upper neck for about 24 hrs after LV.  Not sure from what.  Used heating pad and meds, which helped.  LBP increased, she thinks from digging weeds out of the yard (having to pull from standing due to difficulty getting up and down from the ground). Has been working on the HEP and diaphragm breathing.     OBJECTIVE:      Therapeutic Exercises (CPT 54961):     1. NuStep, L1 x 5 min    2. Star gazer pec stretch with diaphragm breath, x 2 min    3. SL t/s rotation (openers), x 10 ea    4. Seated c/s rotation in cervical flexion, x 15 ea    5. Bridge, too painful today-> reverted to TA brace with glute squeeze. 5\" x 10    6. LTR, x 10 ea way    7. STS, re-ed x 3 min, 23\" provided appropriate challenge for 5 reps without UE support    8. Review floor transfers through lunge.  Did not perform due to fatigue in LEs from above.     9. Rows, L1 x 20      Time-based treatments/modalities:    Physical Therapy Timed Treatment Charges  Therapeutic exercise minutes (CPT 93032): 42 minutes      ASSESSMENT:   Response to treatment: Increased LBP results in inability to bridge today.  Limited power from STS, results in increased lumbar strain through stooping positions.  Would benefit from therex to enhance power from STS and support indep with floor transfers.     PLAN/RECOMMENDATIONS:   Plan for treatment: therapy treatment to continue next visit.  Planned interventions for next visit: continue with current treatment.       "

## 2021-07-12 ENCOUNTER — PHYSICAL THERAPY (OUTPATIENT)
Dept: PHYSICAL THERAPY | Facility: REHABILITATION | Age: 86
End: 2021-07-12
Attending: NURSE PRACTITIONER
Payer: MEDICARE

## 2021-07-12 DIAGNOSIS — M54.2 NECK PAIN, CHRONIC: ICD-10-CM

## 2021-07-12 DIAGNOSIS — G89.29 NECK PAIN, CHRONIC: ICD-10-CM

## 2021-07-12 DIAGNOSIS — G89.29 CHRONIC MIDLINE LOW BACK PAIN WITHOUT SCIATICA: ICD-10-CM

## 2021-07-12 DIAGNOSIS — M54.50 CHRONIC MIDLINE LOW BACK PAIN WITHOUT SCIATICA: ICD-10-CM

## 2021-07-12 PROCEDURE — 97110 THERAPEUTIC EXERCISES: CPT

## 2021-07-12 NOTE — OP THERAPY DAILY TREATMENT
"  Outpatient Physical Therapy  DAILY TREATMENT     [facility] Physical Therapy [address].  [address]  [place] [address]  Phone:  [phone]  Fax:  [phone]    Date: 07/12/2021    Patient: Kerri Newell  YOB: 1935  MRN: [id number]     Time Calculation    Start time: 1015  Stop time: 1059 Time Calculation (min): 44 minutes         Chief Complaint: Neck Problem and Back Problem    Visit #: 4    SUBJECTIVE:  I'm doing ok.  Did work on the STS, but had to be careful due to increased R knee pain.     OBJECTIVE:      Therapeutic Exercises (CPT 90795):     1. NuStep, L1 x 6 min, R knee pain at the beginning, but resolved after 2-3 mins.     2. Scapular clocks, x 20 ea    3. Supine c/s rotation (with chin tuck), x 10 ea    4. KTOS, x 1 min ea    5. Active HS str, x 10 ea supine    6. LTR, x 10 ea way    7. Ball rolls, x 20    8. Ball bridge, 5\" x 10    9. Core sets (seated, hands on table), unable without diaphragm/trunk ext/suck in compensations.  Opted for TA bracing with fingers on ASIS., 5 mins re-ed to correct suck in, shown images of core muscularture.     10. Shuttle, Squat: 5C bilat x 30, 2C (R) x 15      Time-based treatments/modalities:    Physical Therapy Timed Treatment Charges  Therapeutic exercise minutes (CPT 88119): 43 minutes    ASSESSMENT:   Response to treatment: Difficulty translating abdominal brace to more functional positions. Continued compensation through diaphragm/holding breath. Weak R LE evidenced by requiring 2C less resistance on shuttle to complete single leg.     PLAN/RECOMMENDATIONS:   Plan for treatment: therapy treatment to continue next visit.  Planned interventions for next visit: continue with current treatment.       "

## 2021-07-13 NOTE — OP THERAPY DAILY TREATMENT
"  Outpatient Physical Therapy  DAILY TREATMENT     [facility] Physical Therapy [address].  [address]  [place] [address]  Phone:  [phone]  Fax:  [phone]    Date: 07/14/2021    Patient: Kerri Newell  YOB: 1935  MRN: [id number]     Time Calculation    Start time: 1015  Stop time: 1100 Time Calculation (min): 45 minutes         Chief Complaint: Neck Problem and Back Problem    Visit #: 5    SUBJECTIVE:  I know I'm better and improving. My back is about 30% better and the neck about 20% better. Still a fluctuating amount of aching in the central neck and back. Using a hot pack and HEP, which does help. Notes doing this before bed helps her fall asleep. I have noticed that the breathing exercises are helping keep O2 sats up.     OBJECTIVE:      Therapeutic Exercises (CPT 93236):     1. NuStep, L1 x 7 min, R knee pain at the beginning, but resolved after 2-3 mins.     2. LTR, x 10 ea    3. 3D pelvic tilts, x 15 ea    4. KTOS, x 1 min ea    5. Standard bridge, mid height x 10, painful in the first few reps and to go any higher    6. SL clam, x 10 ea, On the R side, pulls down the thigh    7. SL reverse clam, x 10 ea    8. Shuttle, Squat SL R 2c x 20    Therapeutic Treatments and Modalities:     1. Manual Therapy (CPT 75740), Hooklying belt txn x 10 min (45/15\" on/off). Supine DTW to c/s paraspinals, CTJ rotational mobs GIII, B R1 mobs GIII, SOR    Time-based treatments/modalities:    Physical Therapy Timed Treatment Charges  Manual therapy minutes (CPT 90155): 20 minutes  Therapeutic exercise minutes (CPT 41023): 25 minutes    ASSESSMENT:   Response to treatment: Pt more indep aware of her breath control, but still req conscious effort. Lumbar is sensitive to manual txn loading, but resulted in decreased pain to follow.  Not ready for mechanical today. Improved bridging tolerance to follow.     PLAN/RECOMMENDATIONS:   Plan for treatment: therapy treatment to continue next " visit.  Planned interventions for next visit: continue with current treatment.  Cont to develop post chain strength to meet pt goal of improved standing time with less LBP. Consider mechanical txn

## 2021-07-14 ENCOUNTER — PHYSICAL THERAPY (OUTPATIENT)
Dept: PHYSICAL THERAPY | Facility: REHABILITATION | Age: 86
End: 2021-07-14
Attending: NURSE PRACTITIONER
Payer: MEDICARE

## 2021-07-14 DIAGNOSIS — G89.29 NECK PAIN, CHRONIC: ICD-10-CM

## 2021-07-14 DIAGNOSIS — M54.2 NECK PAIN, CHRONIC: ICD-10-CM

## 2021-07-14 DIAGNOSIS — M54.50 CHRONIC MIDLINE LOW BACK PAIN WITHOUT SCIATICA: ICD-10-CM

## 2021-07-14 DIAGNOSIS — G89.29 CHRONIC MIDLINE LOW BACK PAIN WITHOUT SCIATICA: ICD-10-CM

## 2021-07-14 PROCEDURE — 97110 THERAPEUTIC EXERCISES: CPT

## 2021-07-14 PROCEDURE — 97140 MANUAL THERAPY 1/> REGIONS: CPT

## 2021-07-19 ENCOUNTER — PHYSICAL THERAPY (OUTPATIENT)
Dept: PHYSICAL THERAPY | Facility: REHABILITATION | Age: 86
End: 2021-07-19
Attending: NURSE PRACTITIONER
Payer: MEDICARE

## 2021-07-19 DIAGNOSIS — G89.29 CHRONIC MIDLINE LOW BACK PAIN WITHOUT SCIATICA: ICD-10-CM

## 2021-07-19 DIAGNOSIS — M54.2 NECK PAIN, CHRONIC: ICD-10-CM

## 2021-07-19 DIAGNOSIS — G89.29 NECK PAIN, CHRONIC: ICD-10-CM

## 2021-07-19 DIAGNOSIS — M54.50 CHRONIC MIDLINE LOW BACK PAIN WITHOUT SCIATICA: ICD-10-CM

## 2021-07-19 PROCEDURE — 97110 THERAPEUTIC EXERCISES: CPT

## 2021-07-19 PROCEDURE — 97140 MANUAL THERAPY 1/> REGIONS: CPT
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
## 2021-08-20 ENCOUNTER — PHYSICAL THERAPY (OUTPATIENT)
Dept: PHYSICAL THERAPY | Facility: REHABILITATION | Age: 86
End: 2021-08-20
Attending: NURSE PRACTITIONER
Payer: MEDICARE

## 2021-08-20 DIAGNOSIS — G89.29 CHRONIC MIDLINE LOW BACK PAIN WITHOUT SCIATICA: ICD-10-CM

## 2021-08-20 DIAGNOSIS — M54.2 NECK PAIN, CHRONIC: ICD-10-CM

## 2021-08-20 DIAGNOSIS — G89.29 NECK PAIN, CHRONIC: ICD-10-CM

## 2021-08-20 DIAGNOSIS — M54.50 CHRONIC MIDLINE LOW BACK PAIN WITHOUT SCIATICA: ICD-10-CM

## 2021-08-20 PROCEDURE — 97110 THERAPEUTIC EXERCISES: CPT

## 2021-08-20 PROCEDURE — 97140 MANUAL THERAPY 1/> REGIONS: CPT

## 2021-08-20 NOTE — OP THERAPY DAILY TREATMENT
"  Outpatient Physical Therapy  DAILY TREATMENT     [facility] Physical Therapy [address].  [address]  [place] [address]  Phone:  [phone]  Fax:  [phone]    Date: 08/20/2021    Patient: Kerri Newell  YOB: 1935  MRN: [id number]     Time Calculation    Start time: 1015  Stop time: 1108 Time Calculation (min): 53 minutes         Chief Complaint: No chief complaint on file.    Visit #: 7    SUBJECTIVE:  Pt returns after 1 month break with her being on vacation.  She reports her neck being about 20% improved overall and the lower back improved 10-15%.  No longer needs the HP on the neck to sleep.  Still has flare ups, but is unsure what triggers it. States standing longer than 45 mins is painful, walking large grocery stores and sitting longer than 1 hr is still painful.  Primary goal is to be able to cook and meal and walk the grocery store without pain.     OBJECTIVE:      Therapeutic Exercises (CPT 86867):     1. SL clam, x 15 ea, R knee pain at the beginning, but resolved after 2-3 mins.     2. Reverse clam, x 15 ea    3. SL hip abd, x 10 ea    4. Ball bridge, x 10    5. Standard bridge, mid height x 10, 30\" hold x1, painful in the first few reps and to go any higher    6. Shuttle, Squat: B 5C x 20, SL: L 3C x 20, R 2c x 20, On the R side, pulls down the thigh    7. Alt GH pullback, L2 x 20 ea    8. TKE, L2 x 15 ea    9. UT stretch, x 30\" ea    10. Doorway pec str, x 30\" ea    11. Rows , L3 x 20    Therapeutic Treatments and Modalities:     1. Manual Therapy (CPT 28270), Supine DTW to c/s paraspinals, CTJ rotational mobs GIII, B R1 mobs GIII, SOR    Time-based treatments/modalities:    Physical Therapy Timed Treatment Charges  Manual therapy minutes (CPT 64746): 8 minutes  Therapeutic exercise minutes (CPT 14638): 45 minutes    ASSESSMENT:   Response to treatment: See PN    PLAN/RECOMMENDATIONS:   Plan for treatment: therapy treatment to continue next visit.  Planned " interventions for next visit: continue with current treatment.

## 2021-08-20 NOTE — OP THERAPY PROGRESS SUMMARY
"  Outpatient Physical Therapy  PROGRESS SUMMARY NOTE      [facility] Physical Therapy [address].  [address]  [place] [address]  Phone:  [phone]  Fax:  [phone]    Date of Visit: 08/20/2021    Patient: Kerri Newell  YOB: 1935  MRN: [id number]     Referring Provider: ALISON Santacruz  [address]  [address]  [place] [address]   Referring Diagnosis Neck pain, chronic [M54.2, G89.29]     Visit Diagnoses     ICD-10-CM   1. Neck pain, chronic  M54.2    G89.29   2. Chronic midline low back pain without sciatica  M54.5    G89.29       Rehab Potential: good    Progress Report Period: 7/1/21-8/20/21    Functional Assessment Used          Objective Findings and Assessment:   Patient progression towards goals:   Ms. Newell has been seen for 7 PT sessions treating her neck and back pain.  Progress has been slow due to the chronicity of the pain and OA.  She reports the most benefit in her neck; reporting 20% overall improvement, better sleep quality and decreased need for use of MHP.  Her lower back continues to cause pain after prolonged standing and walking.  She demonstrates the R LE substantially weaker than the L due to compensations for R knee OA.  Pain in the R knee has slowed overall progress for the lower back due to limited strengthening options.  Pt's primary goals at this time are to improve standing tolerance to be able to cook a meal without pain and be able to ambulate a grocery store without LBP  Recommending she continue with skilled PT services to assist with further strength development to work toward her functional goals.       Goals:   Short Term Goals:   - Improve R knee ext to 4/5  - Improve STS to without UE support from 16\" surface  - Able to hold bridge support for 1 min  Short term goal time span:  1-2 weeks      Long Term Goals:    - Improve RMQ to less than 10/24  - Pt reports being able to cook a meal with less than 3/10 LBP  - Pt " reports being able to ambulate a grocery store with less than 3/10 LBP  - Indep with HEP  Long term goal time span:  6-8 weeks    Plan:   Planned therapy interventions:  E Stim Unattended (CPT 52222), Therapeutic Activities (CPT 09339), Therapeutic Exercise (CPT 66802), Neuromuscular Re-education (CPT 36143), Manual Therapy (CPT 16454), Functional Training, Self Care (CPT 34481), Hot or Cold Pack Therapy (CPT 02507) and Mechanical Traction (CPT 25114)  Frequency: 1-2x/week.  Duration in weeks:  8      Referring provider co-signature:  I have reviewed this plan of care and my co-signature certifies the need for services.     Certification Period: 08/20/2021 to 10/15/21    Physician Signature: ________________________________ Date: ______________

## 2021-08-24 ENCOUNTER — PHYSICAL THERAPY (OUTPATIENT)
Dept: PHYSICAL THERAPY | Facility: REHABILITATION | Age: 86
End: 2021-08-24
Attending: NURSE PRACTITIONER
Payer: MEDICARE

## 2021-08-24 DIAGNOSIS — G89.29 CHRONIC MIDLINE LOW BACK PAIN WITHOUT SCIATICA: ICD-10-CM

## 2021-08-24 DIAGNOSIS — G89.29 NECK PAIN, CHRONIC: ICD-10-CM

## 2021-08-24 DIAGNOSIS — M54.50 CHRONIC MIDLINE LOW BACK PAIN WITHOUT SCIATICA: ICD-10-CM

## 2021-08-24 DIAGNOSIS — M54.2 NECK PAIN, CHRONIC: ICD-10-CM

## 2021-08-24 PROBLEM — E78.5 HYPERLIPIDEMIA LDL GOAL <70: Status: ACTIVE | Noted: 2021-08-24

## 2021-08-24 PROCEDURE — 97110 THERAPEUTIC EXERCISES: CPT

## 2021-08-24 PROCEDURE — 97140 MANUAL THERAPY 1/> REGIONS: CPT

## 2021-08-24 PROCEDURE — 97530 THERAPEUTIC ACTIVITIES: CPT
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
## 2021-08-26 ENCOUNTER — APPOINTMENT (OUTPATIENT)
Dept: PHYSICAL THERAPY | Facility: REHABILITATION | Age: 86
End: 2021-08-26
Attending: NURSE PRACTITIONER
Payer: MEDICARE

## 2021-08-31 ENCOUNTER — APPOINTMENT (OUTPATIENT)
Dept: PHYSICAL THERAPY | Facility: REHABILITATION | Age: 86
End: 2021-08-31
Attending: NURSE PRACTITIONER
Payer: MEDICARE

## 2021-09-02 ENCOUNTER — APPOINTMENT (OUTPATIENT)
Dept: PHYSICAL THERAPY | Facility: REHABILITATION | Age: 86
End: 2021-09-02
Attending: NURSE PRACTITIONER
Payer: MEDICARE

## 2021-10-04 ENCOUNTER — TELEPHONE (OUTPATIENT)
Dept: MEDICAL GROUP | Facility: MEDICAL CENTER | Age: 86
End: 2021-10-04

## 2021-10-04 DIAGNOSIS — E11.51 DM (DIABETES MELLITUS) TYPE II, CONTROLLED, WITH PERIPHERAL VASCULAR DISORDER (HCC): ICD-10-CM

## 2021-10-04 DIAGNOSIS — E78.5 HYPERLIPIDEMIA LDL GOAL <70: ICD-10-CM

## 2021-10-11 ENCOUNTER — OFFICE VISIT (OUTPATIENT)
Dept: MEDICAL GROUP | Facility: MEDICAL CENTER | Age: 86
End: 2021-10-11
Payer: MEDICARE

## 2021-10-11 ENCOUNTER — HOSPITAL ENCOUNTER (OUTPATIENT)
Facility: MEDICAL CENTER | Age: 86
End: 2021-10-11
Attending: NURSE PRACTITIONER
Payer: MEDICARE

## 2021-10-11 ENCOUNTER — TELEPHONE (OUTPATIENT)
Dept: MEDICAL GROUP | Facility: MEDICAL CENTER | Age: 86
End: 2021-10-11

## 2021-10-11 VITALS
TEMPERATURE: 97.3 F | HEART RATE: 72 BPM | HEIGHT: 66 IN | OXYGEN SATURATION: 93 % | SYSTOLIC BLOOD PRESSURE: 144 MMHG | BODY MASS INDEX: 29.06 KG/M2 | WEIGHT: 180.8 LBS | DIASTOLIC BLOOD PRESSURE: 76 MMHG

## 2021-10-11 DIAGNOSIS — E11.51 DM (DIABETES MELLITUS) TYPE II, CONTROLLED, WITH PERIPHERAL VASCULAR DISORDER (HCC): ICD-10-CM

## 2021-10-11 DIAGNOSIS — J43.8 OTHER EMPHYSEMA (HCC): ICD-10-CM

## 2021-10-11 DIAGNOSIS — E78.5 HYPERLIPIDEMIA LDL GOAL <100: ICD-10-CM

## 2021-10-11 DIAGNOSIS — K21.9 GASTROESOPHAGEAL REFLUX DISEASE WITHOUT ESOPHAGITIS: ICD-10-CM

## 2021-10-11 DIAGNOSIS — R39.198 DIFFICULTY URINATING: ICD-10-CM

## 2021-10-11 DIAGNOSIS — Z78.0 POSTMENOPAUSAL: ICD-10-CM

## 2021-10-11 DIAGNOSIS — I10 ESSENTIAL HYPERTENSION, BENIGN: ICD-10-CM

## 2021-10-11 LAB
APPEARANCE UR: CLEAR
BILIRUB UR QL STRIP.AUTO: NEGATIVE
COLOR UR: YELLOW
GLUCOSE UR STRIP.AUTO-MCNC: NEGATIVE MG/DL
KETONES UR STRIP.AUTO-MCNC: NEGATIVE MG/DL
LEUKOCYTE ESTERASE UR QL STRIP.AUTO: NEGATIVE
MICRO URNS: NORMAL
NITRITE UR QL STRIP.AUTO: NEGATIVE
PH UR STRIP.AUTO: 5 [PH] (ref 5–8)
PROT UR QL STRIP: NEGATIVE MG/DL
RBC UR QL AUTO: NEGATIVE
SP GR UR STRIP.AUTO: 1.01

## 2021-10-11 PROCEDURE — 99214 OFFICE O/P EST MOD 30 MIN: CPT | Performed by: NURSE PRACTITIONER

## 2021-10-11 PROCEDURE — 81003 URINALYSIS AUTO W/O SCOPE: CPT

## 2021-10-11 RX ORDER — FELODIPINE 10 MG/1
10 TABLET, EXTENDED RELEASE ORAL
Qty: 90 TABLET | Refills: 3 | Status: SHIPPED | OUTPATIENT
Start: 2021-10-11 | End: 2022-04-17

## 2021-10-11 RX ORDER — FELODIPINE 2.5 MG/1
2.5 TABLET, EXTENDED RELEASE ORAL
Qty: 90 TABLET | Refills: 3 | Status: SHIPPED | OUTPATIENT
Start: 2021-10-11 | End: 2022-03-24

## 2021-10-11 RX ORDER — RABEPRAZOLE SODIUM 20 MG/1
20 TABLET, DELAYED RELEASE ORAL DAILY
Qty: 90 TABLET | Refills: 3 | Status: SHIPPED | OUTPATIENT
Start: 2021-10-11 | End: 2021-11-08 | Stop reason: SDUPTHER

## 2021-10-11 RX ORDER — FENOFIBRATE 48 MG/1
48 TABLET, COATED ORAL
Qty: 36 TABLET | Refills: 3 | Status: SHIPPED | OUTPATIENT
Start: 2021-10-11 | End: 2021-10-27 | Stop reason: SDUPTHER

## 2021-10-11 RX ORDER — LOSARTAN POTASSIUM AND HYDROCHLOROTHIAZIDE 12.5; 5 MG/1; MG/1
1 TABLET ORAL
Qty: 90 TABLET | Refills: 3 | Status: SHIPPED | OUTPATIENT
Start: 2021-10-11 | End: 2022-05-09 | Stop reason: SDUPTHER

## 2021-10-11 NOTE — NON-PROVIDER
Kerri is seen in the off for difficulty urinating    HPI  Kerri is 86 years old and is here for difficulty urinating. She reports this happened one month ago. She says when she tries to go it isn't very much, but she is having frequency. She denied any pain, hematuria, burning, fever, and chills. She does have pressure.   She has COPD and wears 2L of oxygen at night as well as when she exerts herself. She is followed by pulmonology and has been taking her prescribed medication appropriately.  She has HTN and checks her BP regularly. She is eating a healthy diet but is not exercising due to her dyspnea with exertion.   She has DM and is on medications which she is taking approp with no issues. She  her BG 1-2 times a week.   She was unaware that labs were ordered, and will get her blood work done this week.   She is due for a dexa scan.  She has already gotten her flu vaccine and covid booster because she is going on a cruise after thanksgiving to [place].     Patient Problem List\  Patient Active Problem List   Diagnosis   • GERD (gastroesophageal reflux disease)   • COPD (chronic obstructive pulmonary disease) ([facility])   • Essential hypertension, benign   • Esophageal stricture   • Diverticulosis   • Osteopenia of multiple sites   • Hiatal hernia   • Arthritis   • Glaucoma   • Age-related cataract of both eyes   • Vitamin D deficiency   • DM (diabetes mellitus) type II, controlled, with peripheral vascular disorder ([facility])   • Complete heart block ([facility])   • CKD (chronic kidney disease) stage 3, GFR 30-59 ml/min ([facility])   • Hyperlipidemia LDL goal <70         Current Medication    Current Outpatient Medications:   •  SITagliptin, 100 mg, Oral, QDAY  •  rabeprazole, 20 mg, Oral, DAILY  •  metFORMIN, 500 mg, Oral, BID WITH MEALS  •  losartan-hydrochlorothiazide, 1 Tablet, Oral, QDAY  •  fenofibrate, 48 mg, Oral, Q48HRS  •  felodipine, 2.5 mg, Oral, QDAY  •  felodipine, 10 mg, Oral, QDAY  •  albuterol, 2 Puff,  "Inhalation, Q6HRS PRN  •  fluticasone, 2 Spray, Nasal, DAILY  •  salmeterol, 1 Puff, Inhalation, BID  •  amoxicillin, TAKE 4 CAPSULES BY MOUTH 1 TIME FOR 1 DOSE  •  Glucosamine HCl (GLUCOSAMINE PO), Take  by mouth.  •  Vitamin D3, Take  by mouth.  •  acetaminophen, 650 mg, Oral, Q4HRS PRN  •  B-12, Take  by mouth.    Allergies  Stiolto respimat [tiotropium bromide-olodaterol] and Sulfa drugs      ROS  Constitutional: Negative. Negative for fever, chills, weight loss, malaise/fatigue and diaphoresis.   HENT: Negative. Negative for hearing loss, ear pain, nosebleeds, congestion, sore throat, neck pain, tinnitus and ear discharge.   Respiratory: Negative hemoptysis,cough, sputum production, shortness of breath, wheezing and stridor.   Cardiovascular: Negative. Negative for chest pain, palpitations, orthopnea, claudication, leg swelling and PND.   Gastrointestinal: Denies nausea, vomiting, diarrhea, constipation, heartburn, melena or hematochezia.  Genitourinary: Denies , hematuria, urinary incontinence. Reports having dysuria, pressure, frequency and urgency.    Vital Signs    /76 (BP Location: Right arm, Patient Position: Sitting)   Pulse 72   Temp 36.3 °C (97.3 °F) (Temporal)   Ht 1.676 m (5' 6\")   Wt 82 kg (180 lb 12.8 oz)   SpO2 93%     Physical Exam:  Vitals reviewed.  Constitutional: Oriented to person, place, and time. appears well-developed and well-nourished. No distress.   Cardiovascular: Normal rate, regular rhythm, normal heart sounds and intact distal pulses. Exam reveals no gallop and no friction rub. No murmur heard. No carotid bruits.   Pulmonary/Chest: Effort normal and breath sounds normal. No stridor. No respiratory distress. no wheezes or rales. exhibits no tenderness.   Musculoskeletal: Normal range of motion. exhibits no edema. micky pedal pulses 2+.  Lymphadenopathy: No cervical or supraclavicular adenopathy.   Neurological: Alert and oriented to person, place, and time. exhibits normal " muscle tone.  Skin: Skin is warm and dry. No diaphoresis.   Psychiatric: Normal mood and affect. Behavior is normal.      Assessment and Plan:   1. Difficulty urinating  URINALYSIS    URINALYSIS,CULTURE IF INDICATED    UA collected, unable to do inoffice testing due to machine being broken    2. DM (diabetes mellitus) type II, controlled, with peripheral vascular disorder (HCC)  SITagliptin (JANUVIA) 100 MG Tab    metFORMIN (GLUCOPHAGE) 500 MG Tab    einforce low carb diet.  inc exercise as able. she will do labs with week f/u to review    3. Other emphysema (HCC)      She is wearing oxygen at night and PRN during the day. Stable on meds followed by pulm   4. Gastroesophageal reflux disease without esophagitis  rabeprazole (ACIPHEX) 20 MG tablet    stable; refilled all meds.     5. Essential hypertension, benign  losartan-hydrochlorothiazide (HYZAAR) 50-12.5 MG per tablet    felodipine (PLENDIL) 2.5 MG TABLET SR 24 HR    felodipine (PLENDIL) 10 MG TABLET SR 24 HR    controlled.  refilled meds.     6. Hyperlipidemia LDL goal <100  fenofibrate (TRICOR) 48 MG Tab     tricor to 3x/wk refilled.  continue healthy diet    7. Postmenopausal  DS-BONE DENSITY STUDY (DEXA)    Dexa scan ordered,         Follow up: Return in about 6 months (around 4/11/2022), or if symptoms worsen or fail to improve or for lab review.

## 2021-10-11 NOTE — PROGRESS NOTES
Subjective:     Kerri Newell is a 86 y.o. female who presents with difficulty with urination.    HPI:    seen in f/u for difficulty with urination.  Sx started month ago. She says when she tries to go it isn't very much, but she is having frequency. She denied any pain, hematuria, burning, fever, and chills. She does have pressure.   She has COPD and wears 2L of oxygen at night as well as when she exerts herself. She is followed by pulmonology and has been taking her prescribed medication appropriately.  BP stable and well controlled on meds. She is eating a healthy diet but is not exercise due to her dyspnea with exertion.   She was unaware that labs were ordered, and will get her blood work done this week.   She is due for a dexa scan.  She has already gotten her flu vaccine and covid booster because she is going on a cruise after thanksgiving to [place]   She is stable on meds for DM.  She is due updated lab.  She needs her meds refilled.  Taking meds approp.  Stable on meds at this time.   She is on aciphex for GERD.  She is stable and well controlled. T aking med approp  She is stable on tricor for her trg.  Taking med approp.  No s/e to med.   Bp stable and well controlled on med.  Sl elevated with SBP today. Taking meds approp.  Needs meds refill.    Patient Active Problem List    Diagnosis Date Noted   • Hyperlipidemia LDL goal <70 08/24/2021   • Complete heart block (HCC)    • CKD (chronic kidney disease) stage 3, GFR 30-59 ml/min ([facility])    • DM (diabetes mellitus) type II, controlled, with peripheral vascular disorder ([facility]) 09/16/2018   • Age-related cataract of both eyes    • Vitamin D deficiency    • Esophageal stricture    • Diverticulosis    • Osteopenia of multiple sites    • Hiatal hernia    • Arthritis    • Glaucoma    • Essential hypertension, benign 09/04/2012   • COPD (chronic obstructive pulmonary disease) ([facility]) 04/05/2012   • GERD (gastroesophageal reflux disease) 07/24/2009        Current medicines (including changes today)  Current Outpatient Medications   Medication Sig Dispense Refill   • SITagliptin (JANUVIA) 100 MG Tab Take 1 Tablet by mouth every day. 90 Tablet 1   • rabeprazole (ACIPHEX) 20 MG tablet Take 1 Tablet by mouth every day. Take on empty stomach in AM 90 Tablet 3   • metFORMIN (GLUCOPHAGE) 500 MG Tab Take 1 Tablet by mouth 2 times a day with meals. 180 Tablet 3   • losartan-hydrochlorothiazide (HYZAAR) 50-12.5 MG per tablet Take 1 Tablet by mouth every day. 90 Tablet 3   • fenofibrate (TRICOR) 48 MG Tab Take 1 Tablet by mouth every 48 hours. TAKE 1 TABLET BY MOUTH EVERY 72 HOURS 36 Tablet 3   • felodipine (PLENDIL) 2.5 MG TABLET SR 24 HR Take 1 Tablet by mouth every day. 90 Tablet 3   • felodipine (PLENDIL) 10 MG TABLET SR 24 HR Take 1 Tablet by mouth every day. TAKE 1 TAB BY MOUTH EVERY DAY. 90 Tablet 3   • albuterol (PROAIR HFA) 108 (90 Base) MCG/ACT Aero Soln inhalation aerosol Inhale 2 Puffs every 6 hours as needed for Shortness of Breath. 3 Each 3   • fluticasone (FLONASE) 50 MCG/ACT nasal spray Administer 2 Sprays into affected nostril(S) every day. 9.9 mL 6   • salmeterol (SEREVENT) 50 MCG/DOSE AEROSOL POWDER, BREATH ACTIVATED Inhale 1 Puff 2 times a day. 90 Each 3   • amoxicillin (AMOXIL) 500 MG Cap TAKE 4 CAPSULES BY MOUTH 1 TIME FOR 1 DOSE 4 Cap 1   • Glucosamine HCl (GLUCOSAMINE PO) Take  by mouth.     • Cholecalciferol (VITAMIN D3) 2000 UNIT Cap Take  by mouth.     • acetaminophen (TYLENOL) 325 MG Tab Take 650 mg by mouth every four hours as needed.     • Cyanocobalamin (B-12) 2500 MCG Tab Take  by mouth.       No current facility-administered medications for this visit.       Allergies   Allergen Reactions   • Stiolto Respimat [Tiotropium Bromide-Olodaterol]      Cannot urinate   • Sulfa Drugs        ROS  Constitutional: Negative. Negative for fever, chills, weight loss, malaise/fatigue and diaphoresis.   HENT: Negative. Negative for hearing loss, ear pain,  "nosebleeds, congestion, sore throat, neck pain, tinnitus and ear discharge.   Respiratory: Negative. Negative for cough, hemoptysis, sputum production, shortness of breath, wheezing and stridor.   Cardiovascular: Negative. Negative for chest pain, palpitations, orthopnea, claudication, leg swelling and PND.   Gastrointestinal: Denies nausea, vomiting, diarrhea, constipation, heartburn, melena or hematochezia.  Genitourinary: Denies hematuria, urinary incontinence.  dysuria, pressure, frequency and urgency.         Objective:     /76 (BP Location: Right arm, Patient Position: Sitting)   Pulse 72   Temp 36.3 °C (97.3 °F) (Temporal)   Ht 1.676 m (5' 6\")   Wt 82 kg (180 lb 12.8 oz)   SpO2 93%  Body mass index is 29.18 kg/m².    Physical Exam:  Physical Exam   Vitals reviewed.  Constitutional: oriented to person, place, and time. appears well-developed and well-nourished. No distress.   Cardiovascular: Normal rate, regular rhythm, normal heart sounds and intact distal pulses.  Exam reveals no gallop and no friction rub.  No murmur heard.  No carotid bruits.   Pulmonary/Chest: Effort normal and breath sounds normal. No stridor. No respiratory distress. no wheezes or rales. exhibits no tenderness.   Musculoskeletal: Normal range of motion. exhibits no edema. micky pedal pulses 2+.  Lymphadenopathy: no cervical or supraclavicular adenopathy.   Abd:  No CVAT,  Soft,  Bs noted in all quadrants.  No HSM.  No abdominal tenderness.   Neurological: alert and oriented to person, place, and time. exhibits normal muscle tone. Coordination normal.   Skin: Skin is warm and dry. no diaphoresis.   Psychiatric: normal mood and affect. behavior is normal.        Assessment and Plan:     The following treatment plan was discussed:    1. Difficulty urinating  URINALYSIS    URINALYSIS,CULTURE IF INDICATED    UA collected, unable to do inoffice testing due to machine being broken.  f/u w/pt w/results.     2. DM (diabetes mellitus) " type II, controlled, with peripheral vascular disorder (HCC)  SITagliptin (JANUVIA) 100 MG Tab    metFORMIN (GLUCOPHAGE) 500 MG Tab    einforce low carb diet.  inc exercise as able. she will do labs with week f/u to review    3. Other emphysema (HCC)      She is wearing oxygen at night and PRN during the day. Stable on meds followed by pulm   4. Gastroesophageal reflux disease without esophagitis  rabeprazole (ACIPHEX) 20 MG tablet    stable; refilled all meds.     5. Essential hypertension, benign  losartan-hydrochlorothiazide (HYZAAR) 50-12.5 MG per tablet    felodipine (PLENDIL) 2.5 MG TABLET SR 24 HR    felodipine (PLENDIL) 10 MG TABLET SR 24 HR    controlled.  refilled meds.     6. Hyperlipidemia LDL goal <100  fenofibrate (TRICOR) 48 MG Tab     tricor to 3x/wk refilled.  continue healthy diet    7. Postmenopausal  DS-BONE DENSITY STUDY (DEXA)    Dexa scan ordered,         Followup: Return in about 6 months (around 4/11/2022), or if symptoms worsen or fail to improve or for lab review.

## 2021-10-12 ENCOUNTER — HOSPITAL ENCOUNTER (OUTPATIENT)
Dept: LAB | Facility: MEDICAL CENTER | Age: 86
End: 2021-10-12
Attending: NURSE PRACTITIONER
Payer: MEDICARE

## 2021-10-12 DIAGNOSIS — E78.5 HYPERLIPIDEMIA LDL GOAL <70: ICD-10-CM

## 2021-10-12 DIAGNOSIS — E11.51 DM (DIABETES MELLITUS) TYPE II, CONTROLLED, WITH PERIPHERAL VASCULAR DISORDER (HCC): ICD-10-CM

## 2021-10-12 LAB
ALBUMIN SERPL BCP-MCNC: 4.3 G/DL (ref 3.2–4.9)
ALBUMIN/GLOB SERPL: 1.4 G/DL
ALP SERPL-CCNC: 69 U/L (ref 30–99)
ALT SERPL-CCNC: 32 U/L (ref 2–50)
ANION GAP SERPL CALC-SCNC: 11 MMOL/L (ref 7–16)
AST SERPL-CCNC: 31 U/L (ref 12–45)
BILIRUB SERPL-MCNC: 0.4 MG/DL (ref 0.1–1.5)
BUN SERPL-MCNC: 29 MG/DL (ref 8–22)
CALCIUM SERPL-MCNC: 9.8 MG/DL (ref 8.5–10.5)
CHLORIDE SERPL-SCNC: 101 MMOL/L (ref 96–112)
CHOLEST SERPL-MCNC: 191 MG/DL (ref 100–199)
CO2 SERPL-SCNC: 22 MMOL/L (ref 20–33)
CREAT SERPL-MCNC: 1.34 MG/DL (ref 0.5–1.4)
EST. AVERAGE GLUCOSE BLD GHB EST-MCNC: 137 MG/DL
FASTING STATUS PATIENT QL REPORTED: NORMAL
GLOBULIN SER CALC-MCNC: 3.1 G/DL (ref 1.9–3.5)
GLUCOSE SERPL-MCNC: 150 MG/DL (ref 65–99)
HBA1C MFR BLD: 6.4 % (ref 4–5.6)
HDLC SERPL-MCNC: 43 MG/DL
LDLC SERPL CALC-MCNC: 98 MG/DL
POTASSIUM SERPL-SCNC: 4.3 MMOL/L (ref 3.6–5.5)
PROT SERPL-MCNC: 7.4 G/DL (ref 6–8.2)
SODIUM SERPL-SCNC: 134 MMOL/L (ref 135–145)
TRIGL SERPL-MCNC: 248 MG/DL (ref 0–149)

## 2021-10-12 PROCEDURE — 80061 LIPID PANEL: CPT

## 2021-10-12 PROCEDURE — 36415 COLL VENOUS BLD VENIPUNCTURE: CPT

## 2021-10-12 PROCEDURE — 83036 HEMOGLOBIN GLYCOSYLATED A1C: CPT | Mod: GA

## 2021-10-12 PROCEDURE — 80053 COMPREHEN METABOLIC PANEL: CPT

## 2021-10-12 NOTE — TELEPHONE ENCOUNTER
Please let pt know that the UA is wnl.  No UTI.  i can order a renal us and refer her to urology for the difficulty with voiding.   Would she like me to do that?

## 2021-10-14 ENCOUNTER — TELEPHONE (OUTPATIENT)
Dept: MEDICAL GROUP | Facility: MEDICAL CENTER | Age: 86
End: 2021-10-14

## 2021-10-14 NOTE — TELEPHONE ENCOUNTER
----- Message from ALISON Santacruz sent at 10/12/2021  8:39 PM PDT -----  Please have pt set appointment to review and discuss treatment for labs with decreased kidney tests.

## 2021-10-15 DIAGNOSIS — N18.32 STAGE 3B CHRONIC KIDNEY DISEASE: ICD-10-CM

## 2021-10-19 NOTE — TELEPHONE ENCOUNTER
Left message for patient to call back office at [phone].  Patient MyChart messaging with Adrienne.

## 2021-10-27 ENCOUNTER — TELEPHONE (OUTPATIENT)
Dept: MEDICAL GROUP | Facility: MEDICAL CENTER | Age: 86
End: 2021-10-27

## 2021-10-27 DIAGNOSIS — E78.5 HYPERLIPIDEMIA LDL GOAL <100: ICD-10-CM

## 2021-10-27 RX ORDER — FENOFIBRATE 48 MG/1
48 TABLET, COATED ORAL
Qty: 42 TABLET | Refills: 3 | Status: SHIPPED | OUTPATIENT
Start: 2021-10-27 | End: 2021-12-15 | Stop reason: SDUPTHER

## 2021-11-05 ENCOUNTER — HOSPITAL ENCOUNTER (OUTPATIENT)
Dept: RADIOLOGY | Facility: MEDICAL CENTER | Age: 86
End: 2021-11-05
Attending: NURSE PRACTITIONER
Payer: MEDICARE

## 2021-11-05 DIAGNOSIS — Z78.0 POSTMENOPAUSAL: ICD-10-CM

## 2021-11-05 PROCEDURE — 77080 DXA BONE DENSITY AXIAL: CPT

## 2021-11-08 DIAGNOSIS — K21.9 GASTROESOPHAGEAL REFLUX DISEASE WITHOUT ESOPHAGITIS: ICD-10-CM

## 2021-11-08 RX ORDER — RABEPRAZOLE SODIUM 20 MG/1
20 TABLET, DELAYED RELEASE ORAL DAILY
Qty: 90 TABLET | Refills: 3 | Status: SHIPPED | OUTPATIENT
Start: 2021-11-08 | End: 2022-05-09 | Stop reason: SDUPTHER

## 2021-11-09 DIAGNOSIS — K21.00 GASTROESOPHAGEAL REFLUX DISEASE WITH ESOPHAGITIS WITHOUT HEMORRHAGE: ICD-10-CM

## 2021-11-11 ENCOUNTER — HOSPITAL ENCOUNTER (OUTPATIENT)
Dept: LAB | Facility: MEDICAL CENTER | Age: 86
End: 2021-11-11
Attending: NURSE PRACTITIONER
Payer: MEDICARE

## 2021-11-11 ENCOUNTER — TELEPHONE (OUTPATIENT)
Dept: MEDICAL GROUP | Facility: MEDICAL CENTER | Age: 86
End: 2021-11-11

## 2021-11-11 DIAGNOSIS — N18.32 STAGE 3B CHRONIC KIDNEY DISEASE: ICD-10-CM

## 2021-11-11 LAB
ANION GAP SERPL CALC-SCNC: 13 MMOL/L (ref 7–16)
BUN SERPL-MCNC: 27 MG/DL (ref 8–22)
CALCIUM SERPL-MCNC: 10.1 MG/DL (ref 8.5–10.5)
CHLORIDE SERPL-SCNC: 103 MMOL/L (ref 96–112)
CO2 SERPL-SCNC: 25 MMOL/L (ref 20–33)
CREAT SERPL-MCNC: 1.37 MG/DL (ref 0.5–1.4)
GLUCOSE SERPL-MCNC: 102 MG/DL (ref 65–99)
POTASSIUM SERPL-SCNC: 4.8 MMOL/L (ref 3.6–5.5)
SODIUM SERPL-SCNC: 141 MMOL/L (ref 135–145)

## 2021-11-11 PROCEDURE — 80048 BASIC METABOLIC PNL TOTAL CA: CPT

## 2021-11-11 PROCEDURE — 36415 COLL VENOUS BLD VENIPUNCTURE: CPT

## 2021-11-12 ENCOUNTER — HOSPITAL ENCOUNTER (OUTPATIENT)
Facility: MEDICAL CENTER | Age: 86
End: 2021-11-12
Attending: NURSE PRACTITIONER
Payer: MEDICARE

## 2021-11-12 DIAGNOSIS — K21.00 GASTROESOPHAGEAL REFLUX DISEASE WITH ESOPHAGITIS WITHOUT HEMORRHAGE: ICD-10-CM

## 2021-11-12 LAB — H PYLORI AG STL QL IA: NOT DETECTED

## 2021-11-12 PROCEDURE — 87338 HPYLORI STOOL AG IA: CPT

## 2021-11-12 NOTE — TELEPHONE ENCOUNTER
Please let pt know that the bmp shows that her GFR is not improved.  i am going to order a renal us and refer her to neph.

## 2021-11-13 ENCOUNTER — TELEPHONE (OUTPATIENT)
Dept: MEDICAL GROUP | Facility: MEDICAL CENTER | Age: 86
End: 2021-11-13

## 2021-11-16 ENCOUNTER — HOSPITAL ENCOUNTER (OUTPATIENT)
Dept: RADIOLOGY | Facility: MEDICAL CENTER | Age: 86
End: 2021-11-16
Attending: NURSE PRACTITIONER
Payer: MEDICARE

## 2021-11-16 DIAGNOSIS — N18.32 STAGE 3B CHRONIC KIDNEY DISEASE: ICD-10-CM

## 2021-11-16 PROCEDURE — 76775 US EXAM ABDO BACK WALL LIM: CPT

## 2021-11-19 ENCOUNTER — APPOINTMENT (OUTPATIENT)
Dept: MEDICAL GROUP | Facility: MEDICAL CENTER | Age: 86
End: 2021-11-19
Payer: MEDICARE

## 2021-12-15 ENCOUNTER — TELEPHONE (OUTPATIENT)
Dept: MEDICAL GROUP | Facility: MEDICAL CENTER | Age: 86
End: 2021-12-15

## 2021-12-15 ENCOUNTER — OFFICE VISIT (OUTPATIENT)
Dept: MEDICAL GROUP | Facility: MEDICAL CENTER | Age: 86
End: 2021-12-15
Payer: MEDICARE

## 2021-12-15 ENCOUNTER — HOSPITAL ENCOUNTER (OUTPATIENT)
Dept: RADIOLOGY | Facility: MEDICAL CENTER | Age: 86
End: 2021-12-15
Attending: NURSE PRACTITIONER
Payer: MEDICARE

## 2021-12-15 VITALS
DIASTOLIC BLOOD PRESSURE: 76 MMHG | BODY MASS INDEX: 28.77 KG/M2 | HEART RATE: 61 BPM | WEIGHT: 179 LBS | TEMPERATURE: 97 F | HEIGHT: 66 IN | OXYGEN SATURATION: 94 % | SYSTOLIC BLOOD PRESSURE: 130 MMHG

## 2021-12-15 DIAGNOSIS — E78.1 HYPERTRIGLYCERIDEMIA: ICD-10-CM

## 2021-12-15 DIAGNOSIS — J06.9 VIRAL URI WITH COUGH: ICD-10-CM

## 2021-12-15 DIAGNOSIS — E11.51 DM (DIABETES MELLITUS) TYPE II, CONTROLLED, WITH PERIPHERAL VASCULAR DISORDER (HCC): ICD-10-CM

## 2021-12-15 DIAGNOSIS — K21.00 GASTROESOPHAGEAL REFLUX DISEASE WITH ESOPHAGITIS WITHOUT HEMORRHAGE: ICD-10-CM

## 2021-12-15 DIAGNOSIS — N18.32 STAGE 3B CHRONIC KIDNEY DISEASE: ICD-10-CM

## 2021-12-15 LAB
EXTERNAL QUALITY CONTROL: NORMAL
SARS-COV+SARS-COV-2 AG RESP QL IA.RAPID: NEGATIVE

## 2021-12-15 PROCEDURE — 87426 SARSCOV CORONAVIRUS AG IA: CPT | Performed by: NURSE PRACTITIONER

## 2021-12-15 PROCEDURE — 99214 OFFICE O/P EST MOD 30 MIN: CPT | Mod: CS | Performed by: NURSE PRACTITIONER

## 2021-12-15 PROCEDURE — 71046 X-RAY EXAM CHEST 2 VIEWS: CPT

## 2021-12-15 RX ORDER — FENOFIBRATE 48 MG/1
TABLET, COATED ORAL
Qty: 48 TABLET | Refills: 1 | Status: SHIPPED | OUTPATIENT
Start: 2021-12-15 | End: 2022-05-09 | Stop reason: SDUPTHER

## 2021-12-15 NOTE — PROGRESS NOTES
Subjective:     Kerri Newell is a 86 y.o. female who presents with CKD.    HPI:   Seen in f/u [facility].  She has been not feeling well with cold.  Her o2 sat has been down to 88% all week.  She has had to wear o2 all week.  She started with a sore throat, then had sinus congestion.  She feels that she may have a fever today.  She is just back from a cruise.  She was ill for the last 3 days of the cruise.  She is now starting to feel better.  Cough is lite yellow.  Was darker yellow.  She has been using nebulizer tx daily.    Reviewed lab with pt.  H pylori is neg. She is stable currently on aciphex.  BMP is down from 44 in 9/20 and 3/25/21.  Then down to 38 in october.  Now down to 37.  She doesn't feel that she is drinking enough water.  Renal us shows both simple and septated cysts.  BMP is normal except glucose mildly elevated.  However cr has been increasing since 2/20.  Was 0.97 in 2/29, then 1.16, 1.17, 1.18, 1.34 and now 1.37.  She has appt set with Dr Aris tong.    Lab in october showed LP with trg down from 309 to 248.  She is on tricor 4x/wk.  She has hx of myalgias with statins.  HDL low but up from 41 to 43.  She is on mostly healthy diet.  Not exercising regularly.  LDL is at goal at 98.  a1c was stable at 6.4.  Taking meds approp    Patient Active Problem List    Diagnosis Date Noted   • Hyperlipidemia LDL goal <70 08/24/2021   • Complete heart block (HCC)    • CKD (chronic kidney disease) stage 3, GFR 30-59 ml/min ([facility])    • DM (diabetes mellitus) type II, controlled, with peripheral vascular disorder ([facility]) 09/16/2018   • Age-related cataract of both eyes    • Vitamin D deficiency    • Esophageal stricture    • Diverticulosis    • Osteopenia of multiple sites    • Hiatal hernia    • Arthritis    • Glaucoma    • Essential hypertension, benign 09/04/2012   • COPD (chronic obstructive pulmonary disease) ([facility]) 04/05/2012   • GERD (gastroesophageal reflux disease) 07/24/2009       Current  medicines (including changes today)  Current Outpatient Medications   Medication Sig Dispense Refill   • Budesonide-Formoterol Fumarate (SYMBICORT INH) Inhale.     • fenofibrate (TRICOR) 48 MG Tab Take 1 tab 4x/wk 48 Tablet 1   • rabeprazole (ACIPHEX) 20 MG tablet Take 1 Tablet by mouth every day. Take on empty stomach in AM 90 Tablet 3   • SITagliptin (JANUVIA) 100 MG Tab Take 1 Tablet by mouth every day. 90 Tablet 1   • metFORMIN (GLUCOPHAGE) 500 MG Tab Take 1 Tablet by mouth 2 times a day with meals. 180 Tablet 3   • losartan-hydrochlorothiazide (HYZAAR) 50-12.5 MG per tablet Take 1 Tablet by mouth every day. 90 Tablet 3   • felodipine (PLENDIL) 2.5 MG TABLET SR 24 HR Take 1 Tablet by mouth every day. 90 Tablet 3   • felodipine (PLENDIL) 10 MG TABLET SR 24 HR Take 1 Tablet by mouth every day. TAKE 1 TAB BY MOUTH EVERY DAY. 90 Tablet 3   • albuterol (PROAIR HFA) 108 (90 Base) MCG/ACT Aero Soln inhalation aerosol Inhale 2 Puffs every 6 hours as needed for Shortness of Breath. 3 Each 3   • fluticasone (FLONASE) 50 MCG/ACT nasal spray Administer 2 Sprays into affected nostril(S) every day. 9.9 mL 6   • salmeterol (SEREVENT) 50 MCG/DOSE AEROSOL POWDER, BREATH ACTIVATED Inhale 1 Puff 2 times a day. 90 Each 3   • amoxicillin (AMOXIL) 500 MG Cap TAKE 4 CAPSULES BY MOUTH 1 TIME FOR 1 DOSE 4 Cap 1   • Glucosamine HCl (GLUCOSAMINE PO) Take  by mouth.     • Cholecalciferol (VITAMIN D3) 2000 UNIT Cap Take  by mouth.     • acetaminophen (TYLENOL) 325 MG Tab Take 650 mg by mouth every four hours as needed.     • Cyanocobalamin (B-12) 2500 MCG Tab Take  by mouth.       No current facility-administered medications for this visit.       Allergies   Allergen Reactions   • Stiolto Respimat [Tiotropium Bromide-Olodaterol]      Cannot urinate   • Sulfa Drugs        ROS  Constitutional: Negative. Negative for fever, chills, weight loss, malaise/fatigue and diaphoresis.   HENT: Negative. Negative for hearing loss, ear pain, nosebleeds,  "congestion, sore throat, neck pain, tinnitus and ear discharge.   Respiratory: Negative. Negative for cough, hemoptysis, sputum production, shortness of breath, wheezing and stridor.   Cardiovascular: Negative. Negative for chest pain, palpitations, orthopnea, claudication, leg swelling and PND.   Gastrointestinal: Denies nausea, vomiting, diarrhea, constipation, heartburn, melena or hematochezia.  Genitourinary: Denies dysuria, hematuria, urinary incontinence, frequency or urgency.        Objective:     /76 (BP Location: Right arm, Patient Position: Sitting)   Pulse 61   Temp 36.1 °C (97 °F) (Temporal)   Ht 1.676 m (5' 6\")   Wt 81.2 kg (179 lb)   SpO2 94%  Body mass index is 28.89 kg/m².    Physical Exam:  Vitals reviewed.  Constitutional: Oriented to person, place, and time. appears well-developed and well-nourished. No distress.   Cardiovascular: Normal rate, regular rhythm, normal heart sounds and intact distal pulses. Exam reveals no gallop and no friction rub. No murmur heard. No carotid bruits.   Pulmonary/Chest: Effort normal and breath sounds normal. No stridor. No respiratory distress. no wheezes or rales. exhibits no tenderness.   Musculoskeletal: Normal range of motion. exhibits no edema. micky pedal pulses 2+.  Lymphadenopathy: No cervical or supraclavicular adenopathy.   Neurological: Alert and oriented to person, place, and time. exhibits normal muscle tone.  Skin: Skin is warm and dry. No diaphoresis.   Psychiatric: Normal mood and affect. Behavior is normal.      Assessment and Plan:     The following treatment plan was discussed:    1. Hypertriglyceridemia  fenofibrate (TRICOR) 48 MG Tab    Comp Metabolic Panel    Lipid Profile    trg & HDL not at goal.  enc pt to improve healthy diet & inc regular exercise. inc tricor to 5x/wk.  do lab & f/u 4 mo for review or sooner if s/e to tricor inc   2. Stage 3b chronic kidney disease (HCC)  Comp Metabolic Panel    MICROALBUMIN CREAT RATIO URINE    " f/u with neph as sched.  enc pt to drink adequate fluids.  plan: recheck lab 4 mo.  f/u for review   3. DM (diabetes mellitus) type II, controlled, with peripheral vascular disorder (HCC)  fenofibrate (TRICOR) 48 MG Tab    Comp Metabolic Panel    HEMOGLOBIN A1C    MICROALBUMIN CREAT RATIO URINE    last a1c was stable.  will continue meds.  f/u 4 mo for lab review.  tam a1c    4. Viral URI with cough  DX-CHEST-2 VIEWS    do CXR today.  f/u w/pt w/results.  rapid fire COVID test done in office   5. Gastroesophageal reflux disease with esophagitis without hemorrhage      stable on med         Followup: Return in about 4 months (around 4/15/2022).

## 2021-12-15 NOTE — TELEPHONE ENCOUNTER
Please let pt know that the cxr shows no acute changes and no pneumonia.  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  She has had this long term.

## 2021-12-22 NOTE — TELEPHONE ENCOUNTER
Pt notified via [employer].    Left message for patient to call back at [phone] for further questions.

## 2022-01-04 ENCOUNTER — OFFICE VISIT (OUTPATIENT)
Dept: NEPHROLOGY | Facility: MEDICAL CENTER | Age: 87
End: 2022-01-04
Payer: MEDICARE

## 2022-01-04 VITALS
DIASTOLIC BLOOD PRESSURE: 74 MMHG | WEIGHT: 179 LBS | OXYGEN SATURATION: 93 % | SYSTOLIC BLOOD PRESSURE: 110 MMHG | BODY MASS INDEX: 28.77 KG/M2 | TEMPERATURE: 97.8 F | HEIGHT: 66 IN | HEART RATE: 70 BPM

## 2022-01-04 DIAGNOSIS — N28.1 KIDNEY CYST, ACQUIRED: ICD-10-CM

## 2022-01-04 DIAGNOSIS — J43.8 OTHER EMPHYSEMA (HCC): Chronic | ICD-10-CM

## 2022-01-04 DIAGNOSIS — Z86.39 HISTORY OF VITAMIN D DEFICIENCY: ICD-10-CM

## 2022-01-04 DIAGNOSIS — I10 ESSENTIAL HYPERTENSION, BENIGN: Chronic | ICD-10-CM

## 2022-01-04 DIAGNOSIS — I44.2 COMPLETE HEART BLOCK (HCC): ICD-10-CM

## 2022-01-04 DIAGNOSIS — N39.41 URGE INCONTINENCE OF URINE: ICD-10-CM

## 2022-01-04 DIAGNOSIS — E11.51 DM (DIABETES MELLITUS) TYPE II, CONTROLLED, WITH PERIPHERAL VASCULAR DISORDER (HCC): Chronic | ICD-10-CM

## 2022-01-04 DIAGNOSIS — N18.32 STAGE 3B CHRONIC KIDNEY DISEASE: ICD-10-CM

## 2022-01-04 PROCEDURE — 99204 OFFICE O/P NEW MOD 45 MIN: CPT | Performed by: INTERNAL MEDICINE

## 2022-01-04 RX ORDER — BUDESONIDE AND FORMOTEROL FUMARATE DIHYDRATE 160; 4.5 UG/1; UG/1
2 AEROSOL RESPIRATORY (INHALATION) 2 TIMES DAILY
COMMUNITY
Start: 2021-12-13 | End: 2022-01-04

## 2022-01-04 ASSESSMENT — ENCOUNTER SYMPTOMS
ABDOMINAL PAIN: 0
SHORTNESS OF BREATH: 1
FEVER: 0

## 2022-01-04 NOTE — PROGRESS NOTES
Chief Complaint   Patient presents with   • New Patient     N18.32 (ICD-10-CM) - Stage 3b chronic kidney disease (HCC)       Requesting Provider: Adrienne Vazquez A.P.N.    HPI:  Kerri Newell is a 86 y.o. female with DM2, HTN, CHB s/p left chest pacemaker 2018 who presents today to establish nephrology care.     Re: DM2, diagnosed mid-1990's. DM2 well controlled over the years. Patient has not had microalbuminuria. Patient has seen an eye doctor and does not have retinopathy. She is on metformin and januvia.     Re: HTN, diagnosed in the 1980's. BP control over the years has been controlled in the last decade, but difficult to control prior to that.     Re: CKD, noted back in 2010. She denies history of SHRUTI. She denies kidney stone. She takes Tylenol for pain, denies NSAIDs. She complains of urinary frequency and weak stream.         Past Medical History:   Diagnosis Date   • Arthritis     osteoarthritis   • CATARACT    • CKD (chronic kidney disease) stage 3, GFR 30-59 ml/min (HCC)    • Complete heart block (HCC)     PMA placed on 8/19/18 at [facility] by Dr Scotty Arias   • COPD (chronic obstructive pulmonary disease) (HCC)    • Diverticulosis    • DM (diabetes mellitus) (HCC)    • Esophageal stricture     with dilation   • GERD (gastroesophageal reflux disease) 7/24/2009   • Glaucoma    • Hiatal hernia    • HTN (hypertension)    • Hyperlipidemia LDL goal <70 8/24/2021   • Osteopenia    • Vitamin D deficiency        Past Surgical History:   Procedure Laterality Date   • TONSILLECTOMY AND ADENOIDECTOMY          Outpatient Encounter Medications as of 1/4/2022   Medication Sig Dispense Refill   • fenofibrate (TRICOR) 48 MG Tab Take 1 tab 4x/wk 48 Tablet 1   • rabeprazole (ACIPHEX) 20 MG tablet Take 1 Tablet by mouth every day. Take on empty stomach in AM 90 Tablet 3   • SITagliptin (JANUVIA) 100 MG Tab Take 1 Tablet by mouth every day. 90 Tablet 1   • metFORMIN (GLUCOPHAGE) 500 MG Tab Take 1  Tablet by mouth 2 times a day with meals. 180 Tablet 3   • losartan-hydrochlorothiazide (HYZAAR) 50-12.5 MG per tablet Take 1 Tablet by mouth every day. 90 Tablet 3   • felodipine (PLENDIL) 2.5 MG TABLET SR 24 HR Take 1 Tablet by mouth every day. 90 Tablet 3   • felodipine (PLENDIL) 10 MG TABLET SR 24 HR Take 1 Tablet by mouth every day. TAKE 1 TAB BY MOUTH EVERY DAY. 90 Tablet 3   • albuterol (PROAIR HFA) 108 (90 Base) MCG/ACT Aero Soln inhalation aerosol Inhale 2 Puffs every 6 hours as needed for Shortness of Breath. 3 Each 3   • fluticasone (FLONASE) 50 MCG/ACT nasal spray Administer 2 Sprays into affected nostril(S) every day. 9.9 mL 6   • Glucosamine HCl (GLUCOSAMINE PO) Take  by mouth.     • Cholecalciferol (VITAMIN D3) 2000 UNIT Cap Take  by mouth.     • acetaminophen (TYLENOL) 325 MG Tab Take 650 mg by mouth every four hours as needed.     • Cyanocobalamin (B-12) 2500 MCG Tab Take  by mouth.     • [DISCONTINUED] WIXELA INHUB 250-50 MCG/DOSE AEROSOL POWDER, BREATH ACTIVATED Inhale 1 Puff 2 times a day.     • [DISCONTINUED] SYMBICORT 160-4.5 MCG/ACT Aerosol Inhale 2 Puffs 2 times a day.     • [DISCONTINUED] Budesonide-Formoterol Fumarate (SYMBICORT INH) Inhale. (Patient not taking: Reported on 1/4/2022)     • [DISCONTINUED] salmeterol (SEREVENT) 50 MCG/DOSE AEROSOL POWDER, BREATH ACTIVATED Inhale 1 Puff 2 times a day. 90 Each 3   • amoxicillin (AMOXIL) 500 MG Cap TAKE 4 CAPSULES BY MOUTH 1 TIME FOR 1 DOSE (Patient not taking: Reported on 1/4/2022) 4 Cap 1     No facility-administered encounter medications on file as of 1/4/2022.        Allergies   Allergen Reactions   • Stiolto Respimat [Tiotropium Bromide-Olodaterol]      Cannot urinate   • Sulfa Drugs        Social History     Socioeconomic History   • Marital status:      Spouse name: Not on file   • Number of children: Not on file   • Years of education: Not on file   • Highest education level: Not on file   Occupational History   • Not on file    Tobacco Use   • Smoking status: Former Smoker     Packs/day: 2.00     Years: 30.00     Pack years: 60.00     Types: Cigarettes     Quit date: 1991     Years since quittin.7   • Smokeless tobacco: Never Used   Vaping Use   • Vaping Use: Never used   Substance and Sexual Activity   • Alcohol use: Yes     Alcohol/week: 0.0 oz     Comment: rare   • Drug use: No   • Sexual activity: Not Currently   Other Topics Concern   • Not on file   Social History Narrative   • Not on file     Social Determinants of Health     Financial Resource Strain:    • Difficulty of Paying Living Expenses: Not on file   Food Insecurity:    • Worried About Running Out of Food in the Last Year: Not on file   • Ran Out of Food in the Last Year: Not on file   Transportation Needs:    • Lack of Transportation (Medical): Not on file   • Lack of Transportation (Non-Medical): Not on file   Physical Activity:    • Days of Exercise per Week: Not on file   • Minutes of Exercise per Session: Not on file   Stress:    • Feeling of Stress : Not on file   Social Connections:    • Frequency of Communication with Friends and Family: Not on file   • Frequency of Social Gatherings with Friends and Family: Not on file   • Attends [employer] Services: Not on file   • Active Member of Clubs or Organizations: Not on file   • Attends Club or Organization Meetings: Not on file   • Marital Status: Not on file   Intimate Partner Violence:    • Fear of Current or Ex-Partner: Not on file   • Emotionally Abused: Not on file   • Physically Abused: Not on file   • Sexually Abused: Not on file   Housing Stability:    • Unable to Pay for Housing in the Last Year: Not on file   • Number of Places Lived in the Last Year: Not on file   • Unstable Housing in the Last Year: Not on file       Family History   Problem Relation Age of Onset   • Cancer Mother         lung CA.   • Hypertension Mother    • Cancer Father         throat and brain   • Diabetes Father    •  "Hypertension Father    • Heart Disease Father    • Heart Disease Maternal Grandfather    • Cancer Paternal Grandmother         pancreatic   • Kidney Disease Neg Hx        Review of Systems   Constitutional: Negative for fever.   Respiratory: Positive for shortness of breath (\"I have that all the time.\").    Cardiovascular: Negative for chest pain.   Gastrointestinal: Negative for abdominal pain.   Genitourinary: Negative for dysuria.   All other systems reviewed and are negative.      /74 (BP Location: Right arm, Patient Position: Sitting, BP Cuff Size: Adult)   Pulse 70   Temp 36.6 °C (97.8 °F) (Temporal)   Ht 1.676 m (5' 6\")   Wt 81.2 kg (179 lb)   SpO2 93%   BMI 28.89 kg/m²     Physical Exam  Constitutional:       General: She is not in acute distress.  HENT:      Mouth/Throat:      Pharynx: No oropharyngeal exudate.   Eyes:      General: No scleral icterus.  Neck:      Trachea: No tracheal deviation.   Cardiovascular:      Rate and Rhythm: Normal rate and regular rhythm.      Heart sounds: Normal heart sounds. No murmur heard.       Comments: Left chest CIED noted  Pulmonary:      Effort: Pulmonary effort is normal.      Breath sounds: Normal breath sounds. No stridor. No rales.   Abdominal:      General: Bowel sounds are normal.      Palpations: Abdomen is soft.      Tenderness: There is no abdominal tenderness.   Musculoskeletal:         General: Normal range of motion.      Cervical back: Neck supple.      Right lower leg: No edema.      Left lower leg: No edema.   Skin:     General: Skin is warm and dry.      Findings: No rash.   Neurological:      Mental Status: She is alert and oriented to person, place, and time.   Psychiatric:         Mood and Affect: Mood and affect normal.           Labs reviewed.    Recent Labs     03/25/21  1012 10/12/21  1017 11/11/21  1315   ALBUMIN 4.3 4.3  --    HDL 41 43  --    TRIGLYCERIDE 309* 248*  --    SODIUM 139 134* 141   POTASSIUM 4.2 4.3 4.8   CHLORIDE 104 " 101 103   CO2 23 22 25   BUN 22 29* 27*   CREATININE 1.18 1.34 1.37       Lab Results   Component Value Date/Time    WBC 9.3 04/12/2018 04:10 PM    RBC 5.29 04/12/2018 04:10 PM    HEMOGLOBIN 15.1 04/12/2018 04:10 PM    HEMATOCRIT 48.3 (H) 04/12/2018 04:10 PM    MCV 91.3 04/12/2018 04:10 PM    MCH 28.5 04/12/2018 04:10 PM    MCHC 31.3 (L) 04/12/2018 04:10 PM    MPV 10.6 04/12/2018 04:10 PM           URINALYSIS:  Lab Results   Component Value Date/Time    COLORURINE Yellow 10/11/2021 1024    CLARITY Clear 10/11/2021 1024    SPECGRAVITY 1.010 10/11/2021 1024    PHURINE 5.0 10/11/2021 1024    KETONES Negative 10/11/2021 1024    PROTEINURIN Negative 10/11/2021 1024    BILIRUBINUR Negative 10/11/2021 1024    NITRITE Negative 10/11/2021 1024    LEUKESTERAS Negative 10/11/2021 1024    OCCULTBLOOD Negative 10/11/2021 1024     UPC  No results found for: TOTPROTUR   Lab Results   Component Value Date/Time    CREATININEU Not Applicable 03/18/2016 1010         Imaging reviewed  No orders to display         Assessment:  Kerri Newell is a 86 y.o. female with DM2, HTN, CHB s/p left chest pacemaker 2018 who presents today to establish nephrology care.    Plan:  1. Stage 3b chronic kidney disease (HCC)  - Underlying CKD likely from hypertension, diabetes, and age-related kidney decline.  Patient is without albuminuria, so at low risk of CKD progression to ESRD.  I recommend avoid NSAIDs and other nephrotoxins.  I recommend a low-salt diet.  I explained the importance of blood pressure and glycemic control to help slow CKD progression.     2. DM (diabetes mellitus) type II, controlled, with peripheral vascular disorder (HCC)  -Well-controlled.  Continue losartan for long-term kidney protection.  Consider starting SGLT2 inhibitor if needed for further glycemic control.  - There is no need to dose adjust metformin if GFR is above 45.  If GFR falls to between 30 and 45, maximum dose of metformin is 1000 mg daily.  If GFR falls  "to less than 30, metformin must be discontinued.     3. Essential hypertension, benign  -Well-controlled.  Continue losartan for long-term kidney protection.  Patient also on felodipine and hydrochlorothiazide.    4.  Chronic hypoxic respiratory failure due to emphysema   -Patient on oxygen at home \"50% of the time.\"  Stable.  Defer further management to primary care and pulmonology.    5. Complete heart block (HCC)  -Status post left chest wall pacemaker.  Stable.  Defer to cardiology and primary care.        Return to clinic 6 months with preclinic labs    Lorenzo Hurst MD  Nephrology    "

## 2022-03-16 ENCOUNTER — HOSPITAL ENCOUNTER (OUTPATIENT)
Dept: LAB | Facility: MEDICAL CENTER | Age: 87
End: 2022-03-16
Attending: STUDENT IN AN ORGANIZED HEALTH CARE EDUCATION/TRAINING PROGRAM
Payer: MEDICARE

## 2022-03-16 LAB
ALBUMIN SERPL BCP-MCNC: 4.7 G/DL (ref 3.2–4.9)
ALBUMIN/GLOB SERPL: 1.7 G/DL
ALP SERPL-CCNC: 66 U/L (ref 30–99)
ALT SERPL-CCNC: 25 U/L (ref 2–50)
ANION GAP SERPL CALC-SCNC: 14 MMOL/L (ref 7–16)
AST SERPL-CCNC: 21 U/L (ref 12–45)
BILIRUB SERPL-MCNC: 0.3 MG/DL (ref 0.1–1.5)
BUN SERPL-MCNC: 29 MG/DL (ref 8–22)
CALCIUM SERPL-MCNC: 10.4 MG/DL (ref 8.5–10.5)
CHLORIDE SERPL-SCNC: 103 MMOL/L (ref 96–112)
CO2 SERPL-SCNC: 22 MMOL/L (ref 20–33)
CREAT SERPL-MCNC: 1.24 MG/DL (ref 0.5–1.4)
GFR SERPLBLD CREATININE-BSD FMLA CKD-EPI: 43 ML/MIN/1.73 M 2
GLOBULIN SER CALC-MCNC: 2.8 G/DL (ref 1.9–3.5)
GLUCOSE SERPL-MCNC: 89 MG/DL (ref 65–99)
POTASSIUM SERPL-SCNC: 4.7 MMOL/L (ref 3.6–5.5)
PROT SERPL-MCNC: 7.5 G/DL (ref 6–8.2)
SODIUM SERPL-SCNC: 139 MMOL/L (ref 135–145)

## 2022-03-16 PROCEDURE — 80053 COMPREHEN METABOLIC PANEL: CPT

## 2022-03-16 PROCEDURE — 36415 COLL VENOUS BLD VENIPUNCTURE: CPT

## 2022-03-23 DIAGNOSIS — I10 ESSENTIAL HYPERTENSION, BENIGN: ICD-10-CM

## 2022-03-23 NOTE — LETTER
March 25, 2022        Kerri Newell  [address] [facility] [address]        Dear Kerri:    Our records indicate that you are due for your next in-clinic visit. Please give us a call at [phone] and our scheduling team will assist you with scheduling this appointment. Be sure to complete your attached labs prior to this appointment!      If you have any questions or concerns, please don't hesitate to call.        Sincerely,        ADILENE Santacruz.    Electronically Signed

## 2022-03-24 ENCOUNTER — HOSPITAL ENCOUNTER (OUTPATIENT)
Dept: RADIOLOGY | Facility: MEDICAL CENTER | Age: 87
End: 2022-03-24
Attending: STUDENT IN AN ORGANIZED HEALTH CARE EDUCATION/TRAINING PROGRAM
Payer: MEDICARE

## 2022-03-24 DIAGNOSIS — N28.1 ACQUIRED CYST OF KIDNEY: ICD-10-CM

## 2022-03-24 PROCEDURE — 700117 HCHG RX CONTRAST REV CODE 255

## 2022-03-24 PROCEDURE — 74178 CT ABD&PLV WO CNTR FLWD CNTR: CPT | Mod: MH

## 2022-03-24 RX ORDER — FELODIPINE 2.5 MG/1
2.5 TABLET, EXTENDED RELEASE ORAL
Qty: 30 TABLET | Refills: 0 | Status: SHIPPED | OUTPATIENT
Start: 2022-03-24 | End: 2022-05-09 | Stop reason: SDUPTHER

## 2022-03-24 RX ADMIN — IOHEXOL 100 ML: 350 INJECTION, SOLUTION INTRAVENOUS at 14:50

## 2022-03-29 ENCOUNTER — NON-PROVIDER VISIT (OUTPATIENT)
Dept: MEDICAL GROUP | Facility: MEDICAL CENTER | Age: 87
End: 2022-03-29
Payer: MEDICARE

## 2022-03-29 DIAGNOSIS — Z23 NEED FOR TDAP VACCINATION: ICD-10-CM

## 2022-03-29 PROCEDURE — 90715 TDAP VACCINE 7 YRS/> IM: CPT | Performed by: NURSE PRACTITIONER

## 2022-03-29 PROCEDURE — 90471 IMMUNIZATION ADMIN: CPT | Performed by: NURSE PRACTITIONER

## 2022-03-29 NOTE — PROGRESS NOTES
"Kerri Newell is a 86 y.o. female here for a non-provider visit for:   TDAP    Reason for immunization: Overdue/Provider Recommended  Immunization records indicate need for vaccine: Yes, confirmed with Epic  Minimum interval has been met for this vaccine: Yes  ABN completed: Not Indicated    VIS Dated  8/6/21 was given to patient: Yes  All IAC Questionnaire questions were answered \"No.\"    Patient tolerated injection and no adverse effects were observed or reported: Yes    Pt scheduled for next dose in series: Not Indicated  "

## 2022-04-26 ENCOUNTER — HOSPITAL ENCOUNTER (OUTPATIENT)
Dept: LAB | Facility: MEDICAL CENTER | Age: 87
End: 2022-04-26
Attending: NURSE PRACTITIONER
Payer: MEDICARE

## 2022-04-26 DIAGNOSIS — E78.1 HYPERTRIGLYCERIDEMIA: ICD-10-CM

## 2022-04-26 DIAGNOSIS — N18.32 STAGE 3B CHRONIC KIDNEY DISEASE: ICD-10-CM

## 2022-04-26 DIAGNOSIS — E11.51 DM (DIABETES MELLITUS) TYPE II, CONTROLLED, WITH PERIPHERAL VASCULAR DISORDER (HCC): ICD-10-CM

## 2022-04-26 DIAGNOSIS — R39.198 DIFFICULTY URINATING: ICD-10-CM

## 2022-04-26 LAB
ALBUMIN SERPL BCP-MCNC: 4.6 G/DL (ref 3.2–4.9)
ALBUMIN/GLOB SERPL: 1.5 G/DL
ALP SERPL-CCNC: 60 U/L (ref 30–99)
ALT SERPL-CCNC: 27 U/L (ref 2–50)
ANION GAP SERPL CALC-SCNC: 16 MMOL/L (ref 7–16)
APPEARANCE UR: CLEAR
AST SERPL-CCNC: 25 U/L (ref 12–45)
BACTERIA #/AREA URNS HPF: ABNORMAL /HPF
BILIRUB SERPL-MCNC: 0.3 MG/DL (ref 0.1–1.5)
BILIRUB UR QL STRIP.AUTO: NEGATIVE
BUN SERPL-MCNC: 22 MG/DL (ref 8–22)
CALCIUM SERPL-MCNC: 9.9 MG/DL (ref 8.5–10.5)
CHLORIDE SERPL-SCNC: 105 MMOL/L (ref 96–112)
CHOLEST SERPL-MCNC: 206 MG/DL (ref 100–199)
CO2 SERPL-SCNC: 21 MMOL/L (ref 20–33)
COLOR UR: YELLOW
CREAT SERPL-MCNC: 1.07 MG/DL (ref 0.5–1.4)
CREAT UR-MCNC: 139.04 MG/DL
EPI CELLS #/AREA URNS HPF: ABNORMAL /HPF
EST. AVERAGE GLUCOSE BLD GHB EST-MCNC: 128 MG/DL
FASTING STATUS PATIENT QL REPORTED: NORMAL
GFR SERPLBLD CREATININE-BSD FMLA CKD-EPI: 51 ML/MIN/1.73 M 2
GLOBULIN SER CALC-MCNC: 3 G/DL (ref 1.9–3.5)
GLUCOSE SERPL-MCNC: 147 MG/DL (ref 65–99)
GLUCOSE UR STRIP.AUTO-MCNC: NEGATIVE MG/DL
HBA1C MFR BLD: 6.1 % (ref 4–5.6)
HDLC SERPL-MCNC: 48 MG/DL
KETONES UR STRIP.AUTO-MCNC: NEGATIVE MG/DL
LDLC SERPL CALC-MCNC: 112 MG/DL
LEUKOCYTE ESTERASE UR QL STRIP.AUTO: ABNORMAL
MICRO URNS: ABNORMAL
MICROALBUMIN UR-MCNC: 2 MG/DL
MICROALBUMIN/CREAT UR: 14 MG/G (ref 0–30)
NITRITE UR QL STRIP.AUTO: NEGATIVE
PH UR STRIP.AUTO: 5.5 [PH] (ref 5–8)
POTASSIUM SERPL-SCNC: 4.4 MMOL/L (ref 3.6–5.5)
PROT SERPL-MCNC: 7.6 G/DL (ref 6–8.2)
PROT UR QL STRIP: NEGATIVE MG/DL
RBC UR QL AUTO: NEGATIVE
SODIUM SERPL-SCNC: 142 MMOL/L (ref 135–145)
SP GR UR STRIP.AUTO: 1.02
TRIGL SERPL-MCNC: 228 MG/DL (ref 0–149)
UROBILINOGEN UR STRIP.AUTO-MCNC: 0.2 MG/DL
WBC #/AREA URNS HPF: ABNORMAL /HPF

## 2022-04-26 PROCEDURE — 80061 LIPID PANEL: CPT

## 2022-04-26 PROCEDURE — 82570 ASSAY OF URINE CREATININE: CPT

## 2022-04-26 PROCEDURE — 80053 COMPREHEN METABOLIC PANEL: CPT

## 2022-04-26 PROCEDURE — 36415 COLL VENOUS BLD VENIPUNCTURE: CPT

## 2022-04-26 PROCEDURE — 82043 UR ALBUMIN QUANTITATIVE: CPT

## 2022-04-26 PROCEDURE — 81001 URINALYSIS AUTO W/SCOPE: CPT

## 2022-04-26 PROCEDURE — 83036 HEMOGLOBIN GLYCOSYLATED A1C: CPT | Mod: GA

## 2022-05-09 ENCOUNTER — OFFICE VISIT (OUTPATIENT)
Dept: MEDICAL GROUP | Facility: MEDICAL CENTER | Age: 87
End: 2022-05-09
Payer: MEDICARE

## 2022-05-09 ENCOUNTER — PHARMACY VISIT (OUTPATIENT)
Dept: PHARMACY | Facility: MEDICAL CENTER | Age: 87
End: 2022-05-09
Payer: COMMERCIAL

## 2022-05-09 VITALS
OXYGEN SATURATION: 93 % | HEART RATE: 76 BPM | DIASTOLIC BLOOD PRESSURE: 76 MMHG | HEIGHT: 66 IN | TEMPERATURE: 96.5 F | SYSTOLIC BLOOD PRESSURE: 142 MMHG | BODY MASS INDEX: 28.54 KG/M2 | WEIGHT: 177.6 LBS

## 2022-05-09 DIAGNOSIS — J44.1 CHRONIC OBSTRUCTIVE PULMONARY DISEASE WITH ACUTE EXACERBATION (HCC): ICD-10-CM

## 2022-05-09 DIAGNOSIS — E78.1 HYPERTRIGLYCERIDEMIA: ICD-10-CM

## 2022-05-09 DIAGNOSIS — N39.41 URGE INCONTINENCE: ICD-10-CM

## 2022-05-09 DIAGNOSIS — K21.9 GASTROESOPHAGEAL REFLUX DISEASE WITHOUT ESOPHAGITIS: ICD-10-CM

## 2022-05-09 DIAGNOSIS — E11.51 DM (DIABETES MELLITUS) TYPE II, CONTROLLED, WITH PERIPHERAL VASCULAR DISORDER (HCC): ICD-10-CM

## 2022-05-09 DIAGNOSIS — N18.31 STAGE 3A CHRONIC KIDNEY DISEASE: ICD-10-CM

## 2022-05-09 DIAGNOSIS — H44.812 BLEEDING OF EYE, LEFT: ICD-10-CM

## 2022-05-09 DIAGNOSIS — H61.21 IMPACTED CERUMEN, RIGHT EAR: ICD-10-CM

## 2022-05-09 DIAGNOSIS — I10 ESSENTIAL HYPERTENSION, BENIGN: ICD-10-CM

## 2022-05-09 PROBLEM — I44.2 COMPLETE ATRIOVENTRICULAR BLOCK (HCC): Status: ACTIVE | Noted: 2022-05-09

## 2022-05-09 PROCEDURE — 99214 OFFICE O/P EST MOD 30 MIN: CPT | Performed by: NURSE PRACTITIONER

## 2022-05-09 PROCEDURE — RXMED WILLOW AMBULATORY MEDICATION CHARGE: Performed by: INTERNAL MEDICINE

## 2022-05-09 RX ORDER — FENOFIBRATE 48 MG/1
TABLET, COATED ORAL
Qty: 180 TABLET | Refills: 0 | Status: SHIPPED | OUTPATIENT
Start: 2022-05-09 | End: 2022-10-24

## 2022-05-09 RX ORDER — RABEPRAZOLE SODIUM 20 MG/1
20 TABLET, DELAYED RELEASE ORAL DAILY
Qty: 90 TABLET | Refills: 3 | Status: SHIPPED | OUTPATIENT
Start: 2022-05-09 | End: 2022-09-01 | Stop reason: SDUPTHER

## 2022-05-09 RX ORDER — FELODIPINE 2.5 MG/1
2.5 TABLET, EXTENDED RELEASE ORAL
Qty: 90 TABLET | Refills: 3 | Status: SHIPPED | OUTPATIENT
Start: 2022-05-09 | End: 2022-09-01 | Stop reason: SDUPTHER

## 2022-05-09 RX ORDER — BNT162B2 0.23 MG/2.25ML
0.3 INJECTION, SUSPENSION INTRAMUSCULAR
Qty: 0.3 ML | Refills: 0 | Status: SHIPPED | OUTPATIENT
Start: 2022-05-09 | End: 2022-09-01

## 2022-05-09 RX ORDER — FELODIPINE 10 MG/1
10 TABLET, EXTENDED RELEASE ORAL
Qty: 90 TABLET | Refills: 3 | Status: SHIPPED | OUTPATIENT
Start: 2022-05-09 | End: 2022-09-01 | Stop reason: SDUPTHER

## 2022-05-09 RX ORDER — LOSARTAN POTASSIUM AND HYDROCHLOROTHIAZIDE 12.5; 5 MG/1; MG/1
1 TABLET ORAL
Qty: 90 TABLET | Refills: 3 | Status: SHIPPED | OUTPATIENT
Start: 2022-05-09 | End: 2022-09-01 | Stop reason: SDUPTHER

## 2022-07-25 ENCOUNTER — APPOINTMENT (RX ONLY)
Dept: URBAN - METROPOLITAN AREA CLINIC 4 | Facility: CLINIC | Age: 87
Setting detail: DERMATOLOGY
End: 2022-07-25

## 2022-07-25 DIAGNOSIS — L81.4 OTHER MELANIN HYPERPIGMENTATION: ICD-10-CM

## 2022-07-25 DIAGNOSIS — Z71.89 OTHER SPECIFIED COUNSELING: ICD-10-CM

## 2022-07-25 PROCEDURE — ? ADDITIONAL NOTES

## 2022-07-25 PROCEDURE — ? COUNSELING

## 2022-07-25 PROCEDURE — ? PHOTO-DOCUMENTATION

## 2022-07-25 ASSESSMENT — LOCATION ZONE DERM
LOCATION ZONE: NOSE
LOCATION ZONE: HAND

## 2022-07-25 ASSESSMENT — LOCATION SIMPLE DESCRIPTION DERM
LOCATION SIMPLE: RIGHT HAND
LOCATION SIMPLE: NOSE
LOCATION SIMPLE: LEFT HAND

## 2022-07-25 ASSESSMENT — LOCATION DETAILED DESCRIPTION DERM
LOCATION DETAILED: LEFT RADIAL DORSAL HAND
LOCATION DETAILED: RIGHT RADIAL DORSAL HAND
LOCATION DETAILED: NASAL DORSUM

## 2022-07-25 NOTE — PROCEDURE: ADDITIONAL NOTES
Additional Notes: Rechecking the area in 6 months and is scheduled. Advised to contact office and come back before visit if the lesion changes before then.
Detail Level: Simple
Render Risk Assessment In Note?: no

## 2022-07-25 NOTE — PROCEDURE: ADDITIONAL NOTES
Detail Level: Simple
Render Risk Assessment In Note?: no
Additional Notes: Discussed that due to the reported change we will re-check this area in 6 months. If any change prior to contact me. Sunscreen recs

## 2022-08-29 ENCOUNTER — HOSPITAL ENCOUNTER (OUTPATIENT)
Dept: LAB | Facility: MEDICAL CENTER | Age: 87
End: 2022-08-29
Attending: NURSE PRACTITIONER
Payer: MEDICARE

## 2022-08-29 DIAGNOSIS — I10 ESSENTIAL HYPERTENSION, BENIGN: ICD-10-CM

## 2022-08-29 DIAGNOSIS — E11.51 DM (DIABETES MELLITUS) TYPE II, CONTROLLED, WITH PERIPHERAL VASCULAR DISORDER (HCC): ICD-10-CM

## 2022-08-29 DIAGNOSIS — E78.1 HYPERTRIGLYCERIDEMIA: ICD-10-CM

## 2022-08-29 LAB
ALBUMIN SERPL BCP-MCNC: 4.3 G/DL (ref 3.2–4.9)
ALBUMIN/GLOB SERPL: 1.4 G/DL
ALP SERPL-CCNC: 63 U/L (ref 30–99)
ALT SERPL-CCNC: 23 U/L (ref 2–50)
ANION GAP SERPL CALC-SCNC: 12 MMOL/L (ref 7–16)
AST SERPL-CCNC: 19 U/L (ref 12–45)
BILIRUB SERPL-MCNC: 0.3 MG/DL (ref 0.1–1.5)
BUN SERPL-MCNC: 27 MG/DL (ref 8–22)
CALCIUM SERPL-MCNC: 9.9 MG/DL (ref 8.5–10.5)
CHLORIDE SERPL-SCNC: 105 MMOL/L (ref 96–112)
CHOLEST SERPL-MCNC: 194 MG/DL (ref 100–199)
CO2 SERPL-SCNC: 22 MMOL/L (ref 20–33)
CREAT SERPL-MCNC: 1.22 MG/DL (ref 0.5–1.4)
CREAT UR-MCNC: 51.69 MG/DL
EST. AVERAGE GLUCOSE BLD GHB EST-MCNC: 128 MG/DL
FASTING STATUS PATIENT QL REPORTED: NORMAL
GFR SERPLBLD CREATININE-BSD FMLA CKD-EPI: 43 ML/MIN/1.73 M 2
GLOBULIN SER CALC-MCNC: 3 G/DL (ref 1.9–3.5)
GLUCOSE SERPL-MCNC: 156 MG/DL (ref 65–99)
HBA1C MFR BLD: 6.1 % (ref 4–5.6)
HDLC SERPL-MCNC: 44 MG/DL
LDLC SERPL CALC-MCNC: 97 MG/DL
MICROALBUMIN UR-MCNC: 3.2 MG/DL
MICROALBUMIN/CREAT UR: 62 MG/G (ref 0–30)
POTASSIUM SERPL-SCNC: 4.3 MMOL/L (ref 3.6–5.5)
PROT SERPL-MCNC: 7.3 G/DL (ref 6–8.2)
SODIUM SERPL-SCNC: 139 MMOL/L (ref 135–145)
TRIGL SERPL-MCNC: 266 MG/DL (ref 0–149)

## 2022-08-29 PROCEDURE — 80061 LIPID PANEL: CPT

## 2022-08-29 PROCEDURE — 82043 UR ALBUMIN QUANTITATIVE: CPT

## 2022-08-29 PROCEDURE — 80053 COMPREHEN METABOLIC PANEL: CPT

## 2022-08-29 PROCEDURE — 36415 COLL VENOUS BLD VENIPUNCTURE: CPT

## 2022-08-29 PROCEDURE — 83036 HEMOGLOBIN GLYCOSYLATED A1C: CPT | Mod: GA

## 2022-08-29 PROCEDURE — 82570 ASSAY OF URINE CREATININE: CPT

## 2022-09-01 ENCOUNTER — OFFICE VISIT (OUTPATIENT)
Dept: MEDICAL GROUP | Facility: MEDICAL CENTER | Age: 87
End: 2022-09-01
Payer: MEDICARE

## 2022-09-01 VITALS
SYSTOLIC BLOOD PRESSURE: 136 MMHG | DIASTOLIC BLOOD PRESSURE: 78 MMHG | HEIGHT: 66 IN | OXYGEN SATURATION: 88 % | BODY MASS INDEX: 28.45 KG/M2 | WEIGHT: 177 LBS | HEART RATE: 69 BPM | TEMPERATURE: 98.1 F

## 2022-09-01 DIAGNOSIS — K21.9 GASTROESOPHAGEAL REFLUX DISEASE WITHOUT ESOPHAGITIS: ICD-10-CM

## 2022-09-01 DIAGNOSIS — J43.8 OTHER EMPHYSEMA (HCC): ICD-10-CM

## 2022-09-01 DIAGNOSIS — N18.31 STAGE 3A CHRONIC KIDNEY DISEASE: ICD-10-CM

## 2022-09-01 DIAGNOSIS — I10 ESSENTIAL HYPERTENSION, BENIGN: ICD-10-CM

## 2022-09-01 DIAGNOSIS — E78.1 HYPERTRIGLYCERIDEMIA: ICD-10-CM

## 2022-09-01 DIAGNOSIS — R80.9 PROTEINURIA, UNSPECIFIED TYPE: ICD-10-CM

## 2022-09-01 DIAGNOSIS — E11.51 DM (DIABETES MELLITUS) TYPE II, CONTROLLED, WITH PERIPHERAL VASCULAR DISORDER (HCC): ICD-10-CM

## 2022-09-01 PROCEDURE — 99214 OFFICE O/P EST MOD 30 MIN: CPT | Performed by: NURSE PRACTITIONER

## 2022-09-01 RX ORDER — LOSARTAN POTASSIUM AND HYDROCHLOROTHIAZIDE 12.5; 5 MG/1; MG/1
1 TABLET ORAL
Qty: 90 TABLET | Refills: 3 | Status: SHIPPED | OUTPATIENT
Start: 2022-09-01 | End: 2023-04-26 | Stop reason: SDUPTHER

## 2022-09-01 RX ORDER — FENOFIBRATE 145 MG/1
145 TABLET, COATED ORAL
Qty: 30 TABLET | Refills: 2 | Status: SHIPPED | OUTPATIENT
Start: 2022-09-02 | End: 2022-09-02 | Stop reason: SDUPTHER

## 2022-09-01 RX ORDER — ALBUTEROL SULFATE 90 UG/1
2 AEROSOL, METERED RESPIRATORY (INHALATION) EVERY 6 HOURS PRN
Qty: 3 EACH | Refills: 3 | Status: SHIPPED | OUTPATIENT
Start: 2022-09-01 | End: 2023-04-26 | Stop reason: SDUPTHER

## 2022-09-01 RX ORDER — FELODIPINE 2.5 MG/1
2.5 TABLET, EXTENDED RELEASE ORAL
Qty: 90 TABLET | Refills: 3 | Status: SHIPPED | OUTPATIENT
Start: 2022-09-01 | End: 2023-04-26 | Stop reason: SDUPTHER

## 2022-09-01 RX ORDER — RABEPRAZOLE SODIUM 20 MG/1
20 TABLET, DELAYED RELEASE ORAL DAILY
Qty: 90 TABLET | Refills: 3 | Status: SHIPPED | OUTPATIENT
Start: 2022-09-01 | End: 2023-04-26 | Stop reason: SDUPTHER

## 2022-09-01 RX ORDER — FELODIPINE 10 MG/1
10 TABLET, EXTENDED RELEASE ORAL
Qty: 90 TABLET | Refills: 3 | Status: SHIPPED | OUTPATIENT
Start: 2022-09-01 | End: 2023-04-26 | Stop reason: SDUPTHER

## 2022-09-01 RX ORDER — BUDESONIDE AND FORMOTEROL FUMARATE DIHYDRATE 160; 4.5 UG/1; UG/1
2 AEROSOL RESPIRATORY (INHALATION) 2 TIMES DAILY
Qty: 3 EACH | Refills: 3 | Status: SHIPPED | OUTPATIENT
Start: 2022-09-01 | End: 2023-04-26 | Stop reason: SDUPTHER

## 2022-09-01 RX ORDER — BUDESONIDE AND FORMOTEROL FUMARATE DIHYDRATE 160; 4.5 UG/1; UG/1
2 AEROSOL RESPIRATORY (INHALATION) 2 TIMES DAILY
COMMUNITY
Start: 2022-06-20 | End: 2022-09-01 | Stop reason: SDUPTHER

## 2022-09-01 NOTE — PROGRESS NOTES
Subjective:     Kerri Newell is a 87 y.o. female who presents with DM.    HPI:   Seen in f/u for DM.  She is feeling ok  Her o2 sat is down today but she left her o2 in the car.    Bp is mildly elevated with .  She monitors at home.  Bp at home 130 SBP.  Taking meds approp.  Needs bp meds refill.  She is on healthy diet.  She is active with caring for her , doing housework and gardening.  She is taking her DM meds approp.  Needs refills onthose.  She is followed by pulm for her COPD.  She does not smoke but did in the past.  Stable on meds for COPD. Needs refill. Her o2 sat is down in 70's today.  She reports that sh e normally wears her o2 but left it in the car today.    Reviewed lab with pt.  GFR is down from 51 to 43.  Not drinking enough water.  Alb/cr ratio is up to 63.  Was normal before.  A1c is stable at 6.1  LP shows trg still elevated at 266.  She is taking tricor 45 mg.  HDL low and LDL IS AT goal.   CMP is wnl except glucose is elevated.  She is on aciphex for her GERD SX.  Well controlled on med.  Needs med refilled.      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    Hyperlipidemia LDL goal <70 08/24/2021    Complete heart block (HCC)     Stage 3b chronic kidney disease (HCC)     DM (diabetes mellitus) type II, controlled, with peripheral vascular disorder (HCC) 09/16/2018    Age-related cataract of both eyes     Vitamin D deficiency     Esophageal stricture     Diverticulosis     Osteopenia of multiple sites     Hiatal hernia     Arthritis     Glaucoma     Essential hypertension, benign 09/04/2012    COPD (chronic obstructive pulmonary disease) (HCC) 04/05/2012    GERD (gastroesophageal reflux disease) 07/24/2009       Current medicines (including changes today)  Current Outpatient Medications   Medication Sig Dispense Refill    SYMBICORT 160-4.5 MCG/ACT Aerosol Inhale 2 Puffs 2 times a day. 3 Each 3    felodipine  ER (PLENDIL) 10 MG TABLET SR 24 HR Take 1 Tablet by mouth every day. 90 Tablet 3    felodipine ER (PLENDIL) 2.5 MG TABLET SR 24 HR Take 1 Tablet by mouth every day. 90 Tablet 3    rabeprazole (ACIPHEX) 20 MG tablet Take 1 Tablet by mouth every day. Take on empty stomach in AM 90 Tablet 3    SITagliptin (JANUVIA) 100 MG Tab Take 1 Tablet by mouth every day. 90 Tablet 3    metFORMIN (GLUCOPHAGE) 500 MG Tab Take 1 Tablet by mouth 2 times a day with meals. 180 Tablet 3    losartan-hydrochlorothiazide (HYZAAR) 50-12.5 MG per tablet Take 1 Tablet by mouth every day. 90 Tablet 3    albuterol (PROAIR HFA) 108 (90 Base) MCG/ACT Aero Soln inhalation aerosol Inhale 2 Puffs every 6 hours as needed for Shortness of Breath. 3 Each 3    [START ON 9/2/2022] fenofibrate (TRICOR) 145 MG Tab Take 1 Tablet by mouth every Mon, Wed, Fri, Sat, and Sun at 6 PM. 30 Tablet 2    fenofibrate (TRICOR) 48 MG Tab Take 1 tab 5x/wk 180 Tablet 0    fluticasone (FLONASE) 50 MCG/ACT nasal spray Administer 2 Sprays into affected nostril(S) every day. 9.9 mL 6    Glucosamine HCl (GLUCOSAMINE PO) Take  by mouth.      Cholecalciferol (VITAMIN D3) 2000 UNIT Cap Take  by mouth.      acetaminophen (TYLENOL) 325 MG Tab Take 650 mg by mouth every four hours as needed.      Cyanocobalamin (B-12) 2500 MCG Tab Take  by mouth.       No current facility-administered medications for this visit.       Allergies   Allergen Reactions    Stiolto Respimat [Tiotropium Bromide-Olodaterol]      Cannot urinate    Sulfa Drugs        ROS  Constitutional: Negative. Negative for fever, chills, weight loss, malaise/fatigue and diaphoresis.   HENT: Negative. Negative for hearing loss, ear pain, nosebleeds, congestion, sore throat, neck pain, tinnitus and ear discharge.   Respiratory: Negative. Negative for cough, hemoptysis, sputum production, shortness of breath, wheezing and stridor.   Cardiovascular: Negative. Negative for chest pain, palpitations, orthopnea, claudication, leg  "swelling and PND.   Gastrointestinal: Denies nausea, vomiting, diarrhea, constipation, heartburn, melena or hematochezia.  Genitourinary: Denies dysuria, hematuria, urinary incontinence, frequency or urgency.        Objective:     /78   Pulse 69   Temp 36.7 °C (98.1 °F) (Temporal)   Ht 1.676 m (5' 6\")   Wt 80.3 kg (177 lb)   SpO2 88%  Body mass index is 28.57 kg/m².    Physical Exam:  Vitals reviewed.  Constitutional: Oriented to person, place, and time. appears well-developed and well-nourished. No distress.   Cardiovascular: Normal rate, regular rhythm, normal heart sounds and intact distal pulses. Exam reveals no gallop and no friction rub. No murmur heard. No carotid bruits.   Pulmonary/Chest: Effort normal and breath sounds normal. No stridor. No respiratory distress. no wheezes or rales. exhibits no tenderness.   Musculoskeletal: Normal range of motion. exhibits no edema. micky pedal pulses 2+.  Lymphadenopathy: No cervical or supraclavicular adenopathy.   Neurological: Alert and oriented to person, place, and time. exhibits normal muscle tone.  Skin: Skin is warm and dry. No diaphoresis.   Psychiatric: Normal mood and affect. Behavior is normal.      Assessment and Plan:     The following treatment plan was discussed:    1. Hypertriglyceridemia  fenofibrate (TRICOR) 145 MG Tab    Comp Metabolic Panel    Lipid Profile    trg & HDL not at goal. enc pt to improve healthy diet & inc regular exercise. inc tricor to 5x/wk.  do lab. fu 4 mo for review. may wean tricor down if sx occur      2. DM (diabetes mellitus) type II, controlled, with peripheral vascular disorder (HCC)  SITagliptin (JANUVIA) 100 MG Tab    metFORMIN (GLUCOPHAGE) 500 MG Tab    MICROALBUMIN CREAT RATIO URINE    last a1c is improved.  will continue meds. improve healthy diet and try do some exercise. plan:tam a1c 5 mo f/u for lab review. RF meds      3. Stage 3a chronic kidney disease (HCC)      GFR dec from previous.  strongly enc pt to " improve water intake.  tam lab 3 mo.  f/u for review      4. Essential hypertension, benign  felodipine ER (PLENDIL) 10 MG TABLET SR 24 HR    felodipine ER (PLENDIL) 2.5 MG TABLET SR 24 HR    losartan-hydrochlorothiazide (HYZAAR) 50-12.5 MG per tablet    MICROALBUMIN CREAT RATIO URINE    controlled.  refilled meds.        5. Gastroesophageal reflux disease without esophagitis  rabeprazole (ACIPHEX) 20 MG tablet    stable; refilled all meds.        6. Other emphysema (HCC)  SYMBICORT 160-4.5 MCG/ACT Aerosol    albuterol (PROAIR HFA) 108 (90 Base) MCG/ACT Aero Soln inhalation aerosol    refilled meds; stable on meds.  followed by pulm.  enc pt to wear o2 all the time.  o2 sat low in ofc today wiht o2 in car      7. Proteinuria, unspecified type  MICROALBUMIN CREAT RATIO URINE    new finding.  alb/cr ratio up. monitor bp.  improve healthy diet and regular exercise.  tam lab 3 mo.  f/u for review            Followup: Return in about 3 months (around 12/1/2022).

## 2022-09-02 DIAGNOSIS — E78.1 HYPERTRIGLYCERIDEMIA: ICD-10-CM

## 2022-09-02 RX ORDER — FENOFIBRATE 145 MG/1
145 TABLET, COATED ORAL
Qty: 90 TABLET | Refills: 2 | Status: SHIPPED | OUTPATIENT
Start: 2022-09-02 | End: 2022-10-24 | Stop reason: SDUPTHER

## 2022-10-19 ENCOUNTER — HOSPITAL ENCOUNTER (OUTPATIENT)
Dept: LAB | Facility: MEDICAL CENTER | Age: 87
End: 2022-10-19
Attending: NURSE PRACTITIONER
Payer: MEDICARE

## 2022-10-19 DIAGNOSIS — E78.1 HYPERTRIGLYCERIDEMIA: ICD-10-CM

## 2022-10-19 DIAGNOSIS — I10 ESSENTIAL HYPERTENSION, BENIGN: ICD-10-CM

## 2022-10-19 DIAGNOSIS — E11.51 DM (DIABETES MELLITUS) TYPE II, CONTROLLED, WITH PERIPHERAL VASCULAR DISORDER (HCC): ICD-10-CM

## 2022-10-19 DIAGNOSIS — R80.9 PROTEINURIA, UNSPECIFIED TYPE: ICD-10-CM

## 2022-10-19 LAB
ALBUMIN SERPL BCP-MCNC: 4.6 G/DL (ref 3.2–4.9)
ALBUMIN/GLOB SERPL: 1.3 G/DL
ALP SERPL-CCNC: 60 U/L (ref 30–99)
ALT SERPL-CCNC: 17 U/L (ref 2–50)
ANION GAP SERPL CALC-SCNC: 11 MMOL/L (ref 7–16)
AST SERPL-CCNC: 16 U/L (ref 12–45)
BILIRUB SERPL-MCNC: 0.3 MG/DL (ref 0.1–1.5)
BUN SERPL-MCNC: 26 MG/DL (ref 8–22)
CALCIUM SERPL-MCNC: 10.1 MG/DL (ref 8.5–10.5)
CHLORIDE SERPL-SCNC: 103 MMOL/L (ref 96–112)
CHOLEST SERPL-MCNC: 186 MG/DL (ref 100–199)
CO2 SERPL-SCNC: 24 MMOL/L (ref 20–33)
CREAT SERPL-MCNC: 1.18 MG/DL (ref 0.5–1.4)
CREAT UR-MCNC: 55.9 MG/DL
FASTING STATUS PATIENT QL REPORTED: NORMAL
GFR SERPLBLD CREATININE-BSD FMLA CKD-EPI: 45 ML/MIN/1.73 M 2
GLOBULIN SER CALC-MCNC: 3.5 G/DL (ref 1.9–3.5)
GLUCOSE SERPL-MCNC: 150 MG/DL (ref 65–99)
HDLC SERPL-MCNC: 50 MG/DL
LDLC SERPL CALC-MCNC: 90 MG/DL
MICROALBUMIN UR-MCNC: 2.8 MG/DL
MICROALBUMIN/CREAT UR: 50 MG/G (ref 0–30)
POTASSIUM SERPL-SCNC: 4 MMOL/L (ref 3.6–5.5)
PROT SERPL-MCNC: 8.1 G/DL (ref 6–8.2)
SODIUM SERPL-SCNC: 138 MMOL/L (ref 135–145)
TRIGL SERPL-MCNC: 232 MG/DL (ref 0–149)

## 2022-10-19 PROCEDURE — 36415 COLL VENOUS BLD VENIPUNCTURE: CPT

## 2022-10-19 PROCEDURE — 80053 COMPREHEN METABOLIC PANEL: CPT

## 2022-10-19 PROCEDURE — 82570 ASSAY OF URINE CREATININE: CPT

## 2022-10-19 PROCEDURE — 80061 LIPID PANEL: CPT

## 2022-10-19 PROCEDURE — 82043 UR ALBUMIN QUANTITATIVE: CPT

## 2022-10-24 ENCOUNTER — OFFICE VISIT (OUTPATIENT)
Dept: MEDICAL GROUP | Facility: MEDICAL CENTER | Age: 87
End: 2022-10-24
Payer: MEDICARE

## 2022-10-24 VITALS
WEIGHT: 176.6 LBS | RESPIRATION RATE: 16 BRPM | SYSTOLIC BLOOD PRESSURE: 120 MMHG | HEIGHT: 66 IN | BODY MASS INDEX: 28.38 KG/M2 | DIASTOLIC BLOOD PRESSURE: 70 MMHG | OXYGEN SATURATION: 83 % | TEMPERATURE: 97.3 F | HEART RATE: 92 BPM

## 2022-10-24 DIAGNOSIS — Z78.0 OSTEOPENIA AFTER MENOPAUSE: ICD-10-CM

## 2022-10-24 DIAGNOSIS — H35.62 RETINAL HEMORRHAGE OF LEFT EYE: ICD-10-CM

## 2022-10-24 DIAGNOSIS — E78.1 HYPERTRIGLYCERIDEMIA: ICD-10-CM

## 2022-10-24 DIAGNOSIS — I10 ESSENTIAL HYPERTENSION, BENIGN: ICD-10-CM

## 2022-10-24 DIAGNOSIS — M85.80 OSTEOPENIA AFTER MENOPAUSE: ICD-10-CM

## 2022-10-24 DIAGNOSIS — R80.9 PROTEINURIA, UNSPECIFIED TYPE: ICD-10-CM

## 2022-10-24 DIAGNOSIS — N18.31 STAGE 3A CHRONIC KIDNEY DISEASE: ICD-10-CM

## 2022-10-24 DIAGNOSIS — E11.51 DM (DIABETES MELLITUS) TYPE II, CONTROLLED, WITH PERIPHERAL VASCULAR DISORDER (HCC): ICD-10-CM

## 2022-10-24 PROCEDURE — 99214 OFFICE O/P EST MOD 30 MIN: CPT | Performed by: NURSE PRACTITIONER

## 2022-10-24 RX ORDER — FENOFIBRATE 145 MG/1
145 TABLET, COATED ORAL DAILY
Qty: 90 TABLET | Refills: 2 | Status: SHIPPED | OUTPATIENT
Start: 2022-10-24 | End: 2023-04-26

## 2022-10-24 NOTE — PROGRESS NOTES
Subjective:     Kerri Newell is a 87 y.o. female who presents with DM.     HPI:   Seen in f/u for DM.  She is drinking more water.  She is improving her diet.  She doesn't exercise much.  She is active with doing her housework, gardening and all the cooking.    She has osteopenia.  She has CAD and is taking ca++.  She is on vitamin d3 but should not take ca++ d/t her CAD.  Will stop.  Not doing weight bearing exercises yet.  She is on mostly healthy diet.  She was on tricor 45 mg but trg were high.  It was increased to 145 mg last appt.  She doesn't shaye daily daily d/t myalgias. Only taking 4x/wk without s/e.    She had left eye retinal hemorrhage.  She has lost vision in left eye until she got eye injections.  She is still getting them regularly for her vision.  Vision in left eye improving.   Reviewed lab with pt.  GFR is low but improved from previous.  Was down to 43 but  now 45.  Not drinkng enough water.  CMP is wnl except glucose elevated.  Last a1c in late august was stable at 6.1.  stable on meds.    Alb/cr ratio is improved.  Was up to 62 but now 50.  Bp is stable and well controlled.  Taking meds approp.  LP shows trg down from 266 to 232.  She is trying to dec carbs in diet. HDL and LDL at goal.  HDL up from 44 to 50.        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    Hyperlipidemia LDL goal <70 08/24/2021    Complete heart block (HCC)     Stage 3b chronic kidney disease (HCC)     DM (diabetes mellitus) type II, controlled, with peripheral vascular disorder (HCC) 09/16/2018    Age-related cataract of both eyes     Vitamin D deficiency     Esophageal stricture     Diverticulosis     Osteopenia of multiple sites     Hiatal hernia     Arthritis     Glaucoma     Essential hypertension, benign 09/04/2012    COPD (chronic obstructive pulmonary disease) (HCC) 04/05/2012    GERD (gastroesophageal reflux disease) 07/24/2009       Current  medicines (including changes today)  Current Outpatient Medications   Medication Sig Dispense Refill    fenofibrate (TRICOR) 145 MG Tab Take 1 Tablet by mouth every day. 90 Tablet 2    SYMBICORT 160-4.5 MCG/ACT Aerosol Inhale 2 Puffs 2 times a day. 3 Each 3    felodipine ER (PLENDIL) 10 MG TABLET SR 24 HR Take 1 Tablet by mouth every day. 90 Tablet 3    felodipine ER (PLENDIL) 2.5 MG TABLET SR 24 HR Take 1 Tablet by mouth every day. 90 Tablet 3    rabeprazole (ACIPHEX) 20 MG tablet Take 1 Tablet by mouth every day. Take on empty stomach in AM 90 Tablet 3    SITagliptin (JANUVIA) 100 MG Tab Take 1 Tablet by mouth every day. 90 Tablet 3    metFORMIN (GLUCOPHAGE) 500 MG Tab Take 1 Tablet by mouth 2 times a day with meals. 180 Tablet 3    losartan-hydrochlorothiazide (HYZAAR) 50-12.5 MG per tablet Take 1 Tablet by mouth every day. 90 Tablet 3    albuterol (PROAIR HFA) 108 (90 Base) MCG/ACT Aero Soln inhalation aerosol Inhale 2 Puffs every 6 hours as needed for Shortness of Breath. 3 Each 3    fluticasone (FLONASE) 50 MCG/ACT nasal spray Administer 2 Sprays into affected nostril(S) every day. 9.9 mL 6    Glucosamine HCl (GLUCOSAMINE PO) Take  by mouth.      Cholecalciferol (VITAMIN D3) 2000 UNIT Cap Take  by mouth.      acetaminophen (TYLENOL) 325 MG Tab Take 650 mg by mouth every four hours as needed.      Cyanocobalamin (B-12) 2500 MCG Tab Take  by mouth.       No current facility-administered medications for this visit.       Allergies   Allergen Reactions    Stiolto Respimat [Tiotropium Bromide-Olodaterol]      Cannot urinate    Sulfa Drugs        ROS  Constitutional: Negative. Negative for fever, chills, weight loss, malaise/fatigue and diaphoresis.   HENT: Negative. Negative for hearing loss, ear pain, nosebleeds, congestion, sore throat, neck pain, tinnitus and ear discharge.   Respiratory: Negative. Negative for cough, hemoptysis, sputum production, shortness of breath, wheezing and stridor.   Cardiovascular:  "Negative. Negative for chest pain, palpitations, orthopnea, claudication, leg swelling and PND.   Gastrointestinal: Denies nausea, vomiting, diarrhea, constipation, heartburn, melena or hematochezia.  Genitourinary: Denies dysuria, hematuria, urinary incontinence, frequency or urgency.        Objective:     /70   Pulse 92   Temp 36.3 °C (97.3 °F) (Temporal)   Resp 16   Ht 1.676 m (5' 6\")   Wt 80.1 kg (176 lb 9.6 oz)   SpO2 (!) 83%  Body mass index is 28.5 kg/m².    Physical Exam:  Vitals reviewed.  Constitutional: Oriented to person, place, and time. appears well-developed and well-nourished. No distress.   Cardiovascular: Normal rate, regular rhythm, normal heart sounds and intact distal pulses. Exam reveals no gallop and no friction rub. No murmur heard. No carotid bruits.   Pulmonary/Chest: Effort normal and breath sounds normal. No stridor. No respiratory distress. no wheezes or rales. exhibits no tenderness.   Musculoskeletal: Normal range of motion. exhibits no edema. micky pedal pulses 2+.  Lymphadenopathy: No cervical or supraclavicular adenopathy.   Neurological: Alert and oriented to person, place, and time. exhibits normal muscle tone.  Skin: Skin is warm and dry. No diaphoresis.   Psychiatric: Normal mood and affect. Behavior is normal.      Assessment and Plan:     The following treatment plan was discussed:    1. Hypertriglyceridemia  fenofibrate (TRICOR) 145 MG Tab    Lipid Profile    trg not at goal. enc pt to improve healthy diet & inc regular exercise. RF & continue tricor 4x/wk.  tam lab 5 mo.  f/u for reivew      2. DM (diabetes mellitus) type II, controlled, with peripheral vascular disorder (HCC)  Comp Metabolic Panel    HEMOGLOBIN A1C    MICROALBUMIN CREAT RATIO URINE    stable on meds.  enc pt to improve healthy diet.  remain active      3. Stage 3a chronic kidney disease (HCC)  Comp Metabolic Panel    MICROALBUMIN CREAT RATIO URINE    GFR sl better. enc pt to improve water intake. "  tam lab 5 mo.  f/u for review      4. Essential hypertension, benign  Comp Metabolic Panel    MICROALBUMIN CREAT RATIO URINE    BP stable and well controlled on meds.      5. Proteinuria, unspecified type  Comp Metabolic Panel    MICROALBUMIN CREAT RATIO URINE    alb/cr ratio is improved but not wnl.  recheck 5 mo.      6. Osteopenia after menopause      enc continue vitamin d3 otc.  stop ca++ d/t CAD.  do weight bearing exercises.  plan: repeat dexa late 2023      7. Retinal hemorrhage of left eye      vision intact.  followed by retinal specialist and ophth            Followup: Return in about 5 years (around 10/24/2027).

## 2022-11-03 ENCOUNTER — PATIENT MESSAGE (OUTPATIENT)
Dept: HEALTH INFORMATION MANAGEMENT | Facility: OTHER | Age: 87
End: 2022-11-03

## 2023-01-04 NOTE — TELEPHONE ENCOUNTER
done   Render Risk Assessment In Note?: no Additional Notes: Right thigh measures 1 cm by 0.8 \\nRight knee measures 1.2 by 1.8\\n\\nPatient had a recent injury that is healing and still has some residual erythema. Will have patient FU in 10 weeks to reassess and ensure sites are continuing to heal WNL. Detail Level: Simple Additional Notes: Patient instructed to RTC for FBE ASAP.

## 2023-04-24 ENCOUNTER — TELEPHONE (OUTPATIENT)
Dept: HEALTH INFORMATION MANAGEMENT | Facility: OTHER | Age: 88
End: 2023-04-24
Payer: MEDICARE

## 2023-04-24 ENCOUNTER — HOSPITAL ENCOUNTER (OUTPATIENT)
Dept: LAB | Facility: MEDICAL CENTER | Age: 88
End: 2023-04-24
Attending: NURSE PRACTITIONER
Payer: MEDICARE

## 2023-04-24 DIAGNOSIS — N18.31 STAGE 3A CHRONIC KIDNEY DISEASE: ICD-10-CM

## 2023-04-24 DIAGNOSIS — E78.1 HYPERTRIGLYCERIDEMIA: ICD-10-CM

## 2023-04-24 DIAGNOSIS — E11.51 DM (DIABETES MELLITUS) TYPE II, CONTROLLED, WITH PERIPHERAL VASCULAR DISORDER (HCC): ICD-10-CM

## 2023-04-24 DIAGNOSIS — I10 ESSENTIAL HYPERTENSION, BENIGN: ICD-10-CM

## 2023-04-24 DIAGNOSIS — R80.9 PROTEINURIA, UNSPECIFIED TYPE: ICD-10-CM

## 2023-04-24 LAB
ALBUMIN SERPL BCP-MCNC: 3.9 G/DL (ref 3.2–4.9)
ALBUMIN/GLOB SERPL: 1.2 G/DL
ALP SERPL-CCNC: 52 U/L (ref 30–99)
ALT SERPL-CCNC: 23 U/L (ref 2–50)
ANION GAP SERPL CALC-SCNC: 14 MMOL/L (ref 7–16)
AST SERPL-CCNC: 15 U/L (ref 12–45)
BILIRUB SERPL-MCNC: 0.3 MG/DL (ref 0.1–1.5)
BUN SERPL-MCNC: 28 MG/DL (ref 8–22)
CALCIUM ALBUM COR SERPL-MCNC: 9.9 MG/DL (ref 8.5–10.5)
CALCIUM SERPL-MCNC: 9.8 MG/DL (ref 8.5–10.5)
CHLORIDE SERPL-SCNC: 103 MMOL/L (ref 96–112)
CHOLEST SERPL-MCNC: 164 MG/DL (ref 100–199)
CO2 SERPL-SCNC: 21 MMOL/L (ref 20–33)
CREAT SERPL-MCNC: 1.3 MG/DL (ref 0.5–1.4)
CREAT UR-MCNC: 110.4 MG/DL
EST. AVERAGE GLUCOSE BLD GHB EST-MCNC: 146 MG/DL
FASTING STATUS PATIENT QL REPORTED: NORMAL
GFR SERPLBLD CREATININE-BSD FMLA CKD-EPI: 40 ML/MIN/1.73 M 2
GLOBULIN SER CALC-MCNC: 3.2 G/DL (ref 1.9–3.5)
GLUCOSE SERPL-MCNC: 160 MG/DL (ref 65–99)
HBA1C MFR BLD: 6.7 % (ref 4–5.6)
HDLC SERPL-MCNC: 50 MG/DL
LDLC SERPL CALC-MCNC: 59 MG/DL
MICROALBUMIN UR-MCNC: <1.2 MG/DL
MICROALBUMIN/CREAT UR: NORMAL MG/G (ref 0–30)
POTASSIUM SERPL-SCNC: 4.2 MMOL/L (ref 3.6–5.5)
PROT SERPL-MCNC: 7.1 G/DL (ref 6–8.2)
SODIUM SERPL-SCNC: 138 MMOL/L (ref 135–145)
TRIGL SERPL-MCNC: 274 MG/DL (ref 0–149)

## 2023-04-24 PROCEDURE — 82043 UR ALBUMIN QUANTITATIVE: CPT

## 2023-04-24 PROCEDURE — 80053 COMPREHEN METABOLIC PANEL: CPT

## 2023-04-24 PROCEDURE — 36415 COLL VENOUS BLD VENIPUNCTURE: CPT

## 2023-04-24 PROCEDURE — 82570 ASSAY OF URINE CREATININE: CPT

## 2023-04-24 PROCEDURE — 83036 HEMOGLOBIN GLYCOSYLATED A1C: CPT | Mod: GA

## 2023-04-24 PROCEDURE — 80061 LIPID PANEL: CPT

## 2023-04-26 ENCOUNTER — OFFICE VISIT (OUTPATIENT)
Dept: MEDICAL GROUP | Facility: MEDICAL CENTER | Age: 88
End: 2023-04-26
Payer: MEDICARE

## 2023-04-26 VITALS
TEMPERATURE: 96.9 F | HEART RATE: 81 BPM | BODY MASS INDEX: 29.09 KG/M2 | RESPIRATION RATE: 16 BRPM | WEIGHT: 181 LBS | HEIGHT: 66 IN | SYSTOLIC BLOOD PRESSURE: 162 MMHG | OXYGEN SATURATION: 94 % | DIASTOLIC BLOOD PRESSURE: 78 MMHG

## 2023-04-26 DIAGNOSIS — K21.9 GASTROESOPHAGEAL REFLUX DISEASE WITHOUT ESOPHAGITIS: ICD-10-CM

## 2023-04-26 DIAGNOSIS — J43.8 OTHER EMPHYSEMA (HCC): ICD-10-CM

## 2023-04-26 DIAGNOSIS — R05.3 CHRONIC COUGH: ICD-10-CM

## 2023-04-26 DIAGNOSIS — N18.32 STAGE 3B CHRONIC KIDNEY DISEASE: ICD-10-CM

## 2023-04-26 DIAGNOSIS — E11.51 DM (DIABETES MELLITUS) TYPE II, CONTROLLED, WITH PERIPHERAL VASCULAR DISORDER (HCC): ICD-10-CM

## 2023-04-26 DIAGNOSIS — I10 ESSENTIAL HYPERTENSION, BENIGN: ICD-10-CM

## 2023-04-26 DIAGNOSIS — E78.1 HYPERTRIGLYCERIDEMIA: ICD-10-CM

## 2023-04-26 PROCEDURE — 99214 OFFICE O/P EST MOD 30 MIN: CPT | Performed by: NURSE PRACTITIONER

## 2023-04-26 RX ORDER — FENOFIBRATE 160 MG/1
160 TABLET ORAL DAILY
Qty: 90 TABLET | Refills: 3 | Status: SHIPPED | OUTPATIENT
Start: 2023-04-26

## 2023-04-26 RX ORDER — FELODIPINE 10 MG/1
10 TABLET, EXTENDED RELEASE ORAL
Qty: 90 TABLET | Refills: 3 | Status: SHIPPED | OUTPATIENT
Start: 2023-04-26 | End: 2023-10-20

## 2023-04-26 RX ORDER — BENZONATATE 100 MG/1
100 CAPSULE ORAL 3 TIMES DAILY PRN
Qty: 60 CAPSULE | Refills: 0 | Status: SHIPPED | OUTPATIENT
Start: 2023-04-26

## 2023-04-26 RX ORDER — RABEPRAZOLE SODIUM 20 MG/1
20 TABLET, DELAYED RELEASE ORAL DAILY
Qty: 90 TABLET | Refills: 3 | Status: SHIPPED | OUTPATIENT
Start: 2023-04-26

## 2023-04-26 RX ORDER — BUDESONIDE AND FORMOTEROL FUMARATE DIHYDRATE 160; 4.5 UG/1; UG/1
2 AEROSOL RESPIRATORY (INHALATION) 2 TIMES DAILY
Qty: 3 EACH | Refills: 3 | Status: SHIPPED | OUTPATIENT
Start: 2023-04-26 | End: 2023-09-27 | Stop reason: SDUPTHER

## 2023-04-26 RX ORDER — FELODIPINE 2.5 MG/1
2.5 TABLET, EXTENDED RELEASE ORAL
Qty: 90 TABLET | Refills: 3 | Status: SHIPPED | OUTPATIENT
Start: 2023-04-26

## 2023-04-26 RX ORDER — ALBUTEROL SULFATE 90 UG/1
2 AEROSOL, METERED RESPIRATORY (INHALATION) EVERY 6 HOURS PRN
Qty: 3 EACH | Refills: 3 | Status: SHIPPED | OUTPATIENT
Start: 2023-04-26

## 2023-04-26 RX ORDER — FLUTICASONE PROPIONATE 50 MCG
2 SPRAY, SUSPENSION (ML) NASAL DAILY
Qty: 9.9 ML | Refills: 6 | Status: SHIPPED | OUTPATIENT
Start: 2023-04-26

## 2023-04-26 RX ORDER — LOSARTAN POTASSIUM AND HYDROCHLOROTHIAZIDE 12.5; 5 MG/1; MG/1
1 TABLET ORAL
Qty: 90 TABLET | Refills: 3 | Status: SHIPPED | OUTPATIENT
Start: 2023-04-26 | End: 2024-02-01

## 2023-04-26 ASSESSMENT — PATIENT HEALTH QUESTIONNAIRE - PHQ9: CLINICAL INTERPRETATION OF PHQ2 SCORE: 0

## 2023-04-26 NOTE — NON-PROVIDER
Subjective:     Kerri Newell is a 87 y.o. female who presents with elevated HgbA1c.    HPI: Ms. Newell arrives today with follow up for her DM. She has not been exercising, but she has been eating a healthy diet. She has cut down her carbs and starches. She takes her fenofibrate every day.   She was recently treated at [facility] for a COPD exacerbation. She completed her steroids and ABX already. She still has a lingering cough. It is productive. It is improving. The cough sometimes makes it difficult for her to sleep. Denies worsening SOB, chest pain. Denies fevers, N/V, malaise or fatigue.   Her labs were reviewed:   CMP is WNL, except: Glucose elevated at 160, up from 150 six months ago.   Her HgbA1c is up at 6.7, 6.1 six months ago.   Her BUN is slightly elevated at 28, Creatinine is normal. Her GFR is 40, reduced from 45 six months ago. Fluids were encouraged.  Her Micro Alb/Cr ration is too low to be detected, much improved from 50 in October.   Her Lipid panel is WNL and excellent in fact (similar to six months ago), except her TG are still very elevated at 274, 232 six months ago.   Her BP was elevated (162/78) today in the clinic, she states she is very stressed as her  is in the hospital and very sick. She checks it at home and it usually runs in the 120-130 systolic.           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    Hyperlipidemia LDL goal <70 08/24/2021    Complete heart block (HCC)     Stage 3b chronic kidney disease (HCC)     DM (diabetes mellitus) type II, controlled, with peripheral vascular disorder (HCC) 09/16/2018    Age-related cataract of both eyes     Vitamin D deficiency     Esophageal stricture     Diverticulosis     Osteopenia of multiple sites     Hiatal hernia     Arthritis     Glaucoma     Essential hypertension, benign 09/04/2012    COPD (chronic obstructive pulmonary disease) (HCC) 04/05/2012    GERD  (gastroesophageal reflux disease) 07/24/2009       Current medicines (including changes today)  Current Outpatient Medications   Medication Sig Dispense Refill    benzonatate (TESSALON) 100 MG Cap Take 1 Capsule by mouth 3 times a day as needed for Cough. 60 Capsule 0    SYMBICORT 160-4.5 MCG/ACT Aerosol Inhale 2 Puffs 2 times a day. 3 Each 3    felodipine ER (PLENDIL) 10 MG TABLET SR 24 HR Take 1 Tablet by mouth every day. 90 Tablet 3    felodipine ER (PLENDIL) 2.5 MG TABLET SR 24 HR Take 1 Tablet by mouth every day. 90 Tablet 3    rabeprazole (ACIPHEX) 20 MG tablet Take 1 Tablet by mouth every day. Take on empty stomach in AM 90 Tablet 3    SITagliptin (JANUVIA) 100 MG Tab Take 1 Tablet by mouth every day. 90 Tablet 3    metFORMIN (GLUCOPHAGE) 500 MG Tab Take 1 Tablet by mouth 2 times a day with meals. 180 Tablet 3    losartan-hydrochlorothiazide (HYZAAR) 50-12.5 MG per tablet Take 1 Tablet by mouth every day. 90 Tablet 3    albuterol (PROAIR HFA) 108 (90 Base) MCG/ACT Aero Soln inhalation aerosol Inhale 2 Puffs every 6 hours as needed for Shortness of Breath. 3 Each 3    fluticasone (FLONASE) 50 MCG/ACT nasal spray Administer 2 Sprays into affected nostril(S) every day. 9.9 mL 6    fenofibrate (TRIGLIDE) 160 MG tablet Take 1 Tablet by mouth every day. 90 Tablet 3    fenofibrate (TRICOR) 145 MG Tab Take 1 Tablet by mouth every day. 90 Tablet 2    Glucosamine HCl (GLUCOSAMINE PO) Take  by mouth.      Cholecalciferol (VITAMIN D3) 2000 UNIT Cap Take  by mouth.      acetaminophen (TYLENOL) 325 MG Tab Take 650 mg by mouth every four hours as needed.      Cyanocobalamin (B-12) 2500 MCG Tab Take  by mouth.       No current facility-administered medications for this visit.       Allergies   Allergen Reactions    Stiolto Respimat [Tiotropium Bromide-Olodaterol]      Cannot urinate    Sulfa Drugs        ROS  Constitutional: Negative. Negative for fever, chills, weight loss, malaise/fatigue and diaphoresis.   HENT: Negative.  "Negative for hearing loss, ear pain, nosebleeds, congestion, sore throat, neck pain, tinnitus and ear discharge.   Respiratory: Negative. Negative for cough, hemoptysis, sputum production, shortness of breath, wheezing and stridor.   Cardiovascular: Negative. Negative for chest pain, palpitations, orthopnea, claudication, leg swelling and PND.   Gastrointestinal: Denies nausea, vomiting, diarrhea, constipation, heartburn, melena or hematochezia.  Genitourinary: Denies dysuria, hematuria, urinary incontinence, frequency or urgency.        Objective:     BP (!) 162/78 (BP Location: Right arm, Patient Position: Sitting, BP Cuff Size: Adult)   Pulse 81   Temp 36.1 °C (96.9 °F) (Temporal)   Resp 16   Ht 1.676 m (5' 6\")   Wt 82.1 kg (181 lb)   SpO2 94%  Body mass index is 29.21 kg/m².    Physical Exam:  Vitals reviewed.  Constitutional: Oriented to person, place, and time. appears well-developed and well-nourished. No distress.   Cardiovascular: Normal rate, regular rhythm, normal heart sounds and intact distal pulses. Exam reveals no gallop and no friction rub. No murmur heard. No carotid bruits.   Pulmonary/Chest: Effort normal and breath sounds normal. No stridor. No respiratory distress. no wheezes or rales. exhibits no tenderness.   Musculoskeletal: Normal range of motion. exhibits no edema. micky pedal pulses 2+.  Lymphadenopathy: No cervical or supraclavicular adenopathy.   Neurological: Alert and oriented to person, place, and time. exhibits normal muscle tone.  Skin: Skin is warm and dry. No diaphoresis.   Psychiatric: Normal mood and affect. Behavior is normal.      Assessment and Plan:     The following treatment plan was discussed:    1. Hypertriglyceridemia  CMP14+LP    Up at 274, increased from 232 six months ago.  Increase tricor to 160 mg daily every day, tam labs 6 months. Encourage healthy diet and exercise.       2. Cough due to bronchospasm  benzonatate (TESSALON) 100 MG Cap    Lingering cough, " COPD exacerbation 2 weeks ago treated with ABX and steroids, improving. Tessalon ordered, educated about drowsiness s/e.       3. Other emphysema (HCC)  SYMBICORT 160-4.5 MCG/ACT Aerosol    albuterol (PROAIR HFA) 108 (90 Base) MCG/ACT Aero Soln inhalation aerosol    fluticasone (FLONASE) 50 MCG/ACT nasal spray    refilled meds; stable on meds.  followed by pulm. On O2 currently.       4. Essential hypertension, benign  felodipine ER (PLENDIL) 10 MG TABLET SR 24 HR    felodipine ER (PLENDIL) 2.5 MG TABLET SR 24 HR    losartan-hydrochlorothiazide (HYZAAR) 50-12.5 MG per tablet    controlled.  refilled meds.  Elevated today in clinic, continue to monitor at home and notify office if elevated.       5. Gastroesophageal reflux disease without esophagitis  rabeprazole (ACIPHEX) 20 MG tablet    stable; refilled all meds.        6. DM (diabetes mellitus) type II, controlled, with peripheral vascular disorder (HCC)  SITagliptin (JANUVIA) 100 MG Tab    metFORMIN (GLUCOPHAGE) 500 MG Tab    HEMOGLOBIN A1C    A1C up at 6.4, 6.1 in October. Continue meds. improve healthy diet and try do some exercise. plan:tam a1c 2 mo f/u for lab review. RF meds            Followup: Return in about 2 months (around 6/26/2023).

## 2023-04-27 NOTE — PROGRESS NOTES
Subjective:     Kerri Newell is a 87 y.o. female who presents with DM.    HPI:   Seen in follow up for her DM.   She has not been exercising, but she has been eating a healthy diet. She has cut down her carbs and starches. She takes her fenofibrate every day.   She was recently treated at [facility] for a COPD exacerbation. She completed her steroids and ABX already. She still has a lingering cough. It is productive. It is improving. The cough sometimes makes it difficult for her to sleep. Denies worsening SOB, chest pain. Denies fevers, N/V, malaise or fatigue.   Her labs were reviewed: Her GFR is 40, reduced from 45 six months ago. She is not drinking adequate amts of water.  CMP is WNL except Glucose elevated at 160, up from 150 six months ago.   Her HgbA1c is up at 6.7, 6.1 six months ago.  She is taking her januvia approp.  Stable on med.  Alb/Cr ratio is too low to be detected, much improved from 50 in October.   Her Lipid panel is WNL and excellent in fact (similar to six months ago), except her TG are still very elevated at 274, 232 six months ago.   Corrected ca++ is wnl.  Her BP was elevated (162/78) today in the clinic, she states she is very stressed as her  is in the hospital and very sick. She checks it at home and it usually runs in the 120-130 systolic.   She is stable on aciphex for her GERD.  No black tarry stools or break through sx.  Needs med refill.         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    Hyperlipidemia LDL goal <70 08/24/2021    Complete heart block (HCC)     Stage 3b chronic kidney disease (HCC)     DM (diabetes mellitus) type II, controlled, with peripheral vascular disorder (HCC) 09/16/2018    Age-related cataract of both eyes     Vitamin D deficiency     Esophageal stricture     Diverticulosis     Osteopenia of multiple sites     Hiatal hernia     Arthritis     Glaucoma     Essential hypertension,  benign 09/04/2012    COPD (chronic obstructive pulmonary disease) ([facility]) 04/05/2012    GERD (gastroesophageal reflux disease) 07/24/2009       Current medicines (including changes today)  Current Outpatient Medications   Medication Sig Dispense Refill    benzonatate (TESSALON) 100 MG Cap Take 1 Capsule by mouth 3 times a day as needed for Cough. 60 Capsule 0    SYMBICORT 160-4.5 MCG/ACT Aerosol Inhale 2 Puffs 2 times a day. 3 Each 3    felodipine ER (PLENDIL) 10 MG TABLET SR 24 HR Take 1 Tablet by mouth every day. 90 Tablet 3    felodipine ER (PLENDIL) 2.5 MG TABLET SR 24 HR Take 1 Tablet by mouth every day. 90 Tablet 3    rabeprazole (ACIPHEX) 20 MG tablet Take 1 Tablet by mouth every day. Take on empty stomach in AM 90 Tablet 3    SITagliptin (JANUVIA) 100 MG Tab Take 1 Tablet by mouth every day. 90 Tablet 3    metFORMIN (GLUCOPHAGE) 500 MG Tab Take 1 Tablet by mouth 2 times a day with meals. 180 Tablet 3    losartan-hydrochlorothiazide (HYZAAR) 50-12.5 MG per tablet Take 1 Tablet by mouth every day. 90 Tablet 3    albuterol (PROAIR HFA) 108 (90 Base) MCG/ACT Aero Soln inhalation aerosol Inhale 2 Puffs every 6 hours as needed for Shortness of Breath. 3 Each 3    fluticasone (FLONASE) 50 MCG/ACT nasal spray Administer 2 Sprays into affected nostril(S) every day. 9.9 mL 6    fenofibrate (TRIGLIDE) 160 MG tablet Take 1 Tablet by mouth every day. 90 Tablet 3    Glucosamine HCl (GLUCOSAMINE PO) Take  by mouth.      Cholecalciferol (VITAMIN D3) 2000 UNIT Cap Take  by mouth.      acetaminophen (TYLENOL) 325 MG Tab Take 650 mg by mouth every four hours as needed.      Cyanocobalamin (B-12) 2500 MCG Tab Take  by mouth.       No current facility-administered medications for this visit.       Allergies   Allergen Reactions    Stiolto Respimat [Tiotropium Bromide-Olodaterol]      Cannot urinate    Sulfa Drugs        ROS  Constitutional: Negative. Negative for fever, chills, weight loss, malaise/fatigue and diaphoresis.   HENT:  "Negative. Negative for hearing loss, ear pain, nosebleeds, congestion, sore throat, neck pain, tinnitus and ear discharge.   Respiratory: Negative. Negative for cough, hemoptysis, sputum production, shortness of breath, wheezing and stridor.   Cardiovascular: Negative. Negative for chest pain, palpitations, orthopnea, claudication, leg swelling and PND.   Gastrointestinal: Denies nausea, vomiting, diarrhea, constipation, heartburn, melena or hematochezia.  Genitourinary: Denies dysuria, hematuria, urinary incontinence, frequency or urgency.        Objective:     BP (!) 162/78 (BP Location: Right arm, Patient Position: Sitting, BP Cuff Size: Adult)   Pulse 81   Temp 36.1 °C (96.9 °F) (Temporal)   Resp 16   Ht 1.676 m (5' 6\")   Wt 82.1 kg (181 lb)   SpO2 94%  Body mass index is 29.21 kg/m².    Physical Exam:  Vitals reviewed.  Constitutional: Oriented to person, place, and time. appears well-developed and well-nourished. No distress.   Cardiovascular: Normal rate, regular rhythm, normal heart sounds and intact distal pulses. Exam reveals no gallop and no friction rub. No murmur heard. No carotid bruits.   Pulmonary/Chest: Effort normal and breath sounds normal. No stridor. No respiratory distress. no wheezes or rales. exhibits no tenderness.   Musculoskeletal: Normal range of motion. exhibits no edema. micky pedal pulses 2+.  Lymphadenopathy: No cervical or supraclavicular adenopathy.   Neurological: Alert and oriented to person, place, and time. exhibits normal muscle tone.  Skin: Skin is warm and dry. No diaphoresis.   Psychiatric: Normal mood and affect. Behavior is normal.      Assessment and Plan:     The following treatment plan was discussed:    1. DM (diabetes mellitus) type II, controlled, with peripheral vascular disorder (HCC)  SITagliptin (JANUVIA) 100 MG Tab    metFORMIN (GLUCOPHAGE) 500 MG Tab    HEMOGLOBIN A1C    A1C up at 6.4, 6.1 in October. Continue meds. improve healthy diet and try do some " exercise. plan:tam a1c 2 mo f/u for lab review. RF meds      2. Essential hypertension, benign  felodipine ER (PLENDIL) 10 MG TABLET SR 24 HR    felodipine ER (PLENDIL) 2.5 MG TABLET SR 24 HR    losartan-hydrochlorothiazide (HYZAAR) 50-12.5 MG per tablet    controlled.  refilled meds.  Elevated today in clinic, continue to monitor at home and notify office if elevated.       3. Hypertriglyceridemia  CMP14+LP    trg up and still high from six mo ago.  Inc tricor to 160 mg daily every day, tam labs 6 months. Encourage healthy diet and exercise. f/u for review      4. Chronic cough  benzonatate (TESSALON) 100 MG Cap    Lingering cough, COPD exacerbation 2 weeks ago treated with ABX and steroids, improving. Tessalon ordered, educated about drowsiness s/e.       5. Other emphysema (HCC)  SYMBICORT 160-4.5 MCG/ACT Aerosol    albuterol (PROAIR HFA) 108 (90 Base) MCG/ACT Aero Soln inhalation aerosol    fluticasone (FLONASE) 50 MCG/ACT nasal spray    refilled meds; stable on meds.  followed by pulm. On O2 currently.       6. Gastroesophageal reflux disease without esophagitis  rabeprazole (ACIPHEX) 20 MG tablet    stable; refilled all meds.              Followup: Return in about 2 months (around 6/26/2023).

## 2023-07-11 ENCOUNTER — HOSPITAL ENCOUNTER (OUTPATIENT)
Dept: LAB | Facility: MEDICAL CENTER | Age: 88
End: 2023-07-11
Attending: NURSE PRACTITIONER
Payer: MEDICARE

## 2023-07-11 DIAGNOSIS — E11.51 DM (DIABETES MELLITUS) TYPE II, CONTROLLED, WITH PERIPHERAL VASCULAR DISORDER (HCC): ICD-10-CM

## 2023-07-11 LAB
ALBUMIN SERPL BCP-MCNC: 4.6 G/DL (ref 3.2–4.9)
ALBUMIN/GLOB SERPL: 1.4 G/DL
ALP SERPL-CCNC: 52 U/L (ref 30–99)
ALT SERPL-CCNC: 18 U/L (ref 2–50)
ANION GAP SERPL CALC-SCNC: 14 MMOL/L (ref 7–16)
AST SERPL-CCNC: 17 U/L (ref 12–45)
BILIRUB SERPL-MCNC: 0.4 MG/DL (ref 0.1–1.5)
BUN SERPL-MCNC: 28 MG/DL (ref 8–22)
CALCIUM ALBUM COR SERPL-MCNC: 9.8 MG/DL (ref 8.5–10.5)
CALCIUM SERPL-MCNC: 10.3 MG/DL (ref 8.5–10.5)
CHLORIDE SERPL-SCNC: 103 MMOL/L (ref 96–112)
CHOLEST SERPL-MCNC: 160 MG/DL (ref 100–199)
CO2 SERPL-SCNC: 23 MMOL/L (ref 20–33)
CREAT SERPL-MCNC: 1.31 MG/DL (ref 0.5–1.4)
FASTING STATUS PATIENT QL REPORTED: NORMAL
GFR SERPLBLD CREATININE-BSD FMLA CKD-EPI: 40 ML/MIN/1.73 M 2
GLOBULIN SER CALC-MCNC: 3.2 G/DL (ref 1.9–3.5)
GLUCOSE SERPL-MCNC: 179 MG/DL (ref 65–99)
HDLC SERPL-MCNC: 50 MG/DL
LDLC SERPL CALC-MCNC: 72 MG/DL
POTASSIUM SERPL-SCNC: 4.1 MMOL/L (ref 3.6–5.5)
PROT SERPL-MCNC: 7.8 G/DL (ref 6–8.2)
SODIUM SERPL-SCNC: 140 MMOL/L (ref 135–145)
TRIGL SERPL-MCNC: 191 MG/DL (ref 0–149)

## 2023-07-11 PROCEDURE — 36415 COLL VENOUS BLD VENIPUNCTURE: CPT

## 2023-07-11 PROCEDURE — 80061 LIPID PANEL: CPT

## 2023-07-11 PROCEDURE — 80053 COMPREHEN METABOLIC PANEL: CPT

## 2023-07-18 ENCOUNTER — OFFICE VISIT (OUTPATIENT)
Dept: MEDICAL GROUP | Facility: MEDICAL CENTER | Age: 88
End: 2023-07-18
Payer: MEDICARE

## 2023-07-18 VITALS
HEART RATE: 65 BPM | HEIGHT: 64 IN | TEMPERATURE: 97 F | SYSTOLIC BLOOD PRESSURE: 124 MMHG | BODY MASS INDEX: 27.83 KG/M2 | OXYGEN SATURATION: 95 % | WEIGHT: 163 LBS | DIASTOLIC BLOOD PRESSURE: 62 MMHG

## 2023-07-18 DIAGNOSIS — E78.1 HYPERTRIGLYCERIDEMIA: ICD-10-CM

## 2023-07-18 DIAGNOSIS — J43.8 OTHER EMPHYSEMA (HCC): ICD-10-CM

## 2023-07-18 DIAGNOSIS — N95.9 POST MENOPAUSAL PROBLEMS: ICD-10-CM

## 2023-07-18 DIAGNOSIS — I10 ESSENTIAL HYPERTENSION, BENIGN: ICD-10-CM

## 2023-07-18 DIAGNOSIS — Z23 NEED FOR PNEUMOCOCCAL 20-VALENT CONJUGATE VACCINATION: ICD-10-CM

## 2023-07-18 DIAGNOSIS — E11.51 DM (DIABETES MELLITUS) TYPE II, CONTROLLED, WITH PERIPHERAL VASCULAR DISORDER (HCC): ICD-10-CM

## 2023-07-18 DIAGNOSIS — N18.32 STAGE 3B CHRONIC KIDNEY DISEASE: ICD-10-CM

## 2023-07-18 DIAGNOSIS — Z87.891 HISTORY OF TOBACCO USE: ICD-10-CM

## 2023-07-18 PROCEDURE — G0009 ADMIN PNEUMOCOCCAL VACCINE: HCPCS | Performed by: NURSE PRACTITIONER

## 2023-07-18 PROCEDURE — 90677 PCV20 VACCINE IM: CPT | Performed by: NURSE PRACTITIONER

## 2023-07-18 PROCEDURE — 3074F SYST BP LT 130 MM HG: CPT | Performed by: NURSE PRACTITIONER

## 2023-07-18 PROCEDURE — 3078F DIAST BP <80 MM HG: CPT | Performed by: NURSE PRACTITIONER

## 2023-07-18 PROCEDURE — 99214 OFFICE O/P EST MOD 30 MIN: CPT | Mod: 25 | Performed by: NURSE PRACTITIONER

## 2023-07-18 NOTE — PROGRESS NOTES
Subjective:     Kerri Newell is a 88 y.o. female who presents with DM.    HPI:   Seen in f/u for DM.  She recently lost her .  After that she had a severe COPD exacerbation.  Has improved now but was on 02 full time for 2 months.  Just now recovering over the last month.  She is breathing well today but yesterday she was on o2 all day.  She monitors her o2 very closely.    She is eating healthy.  She is not able to exercise d/t SOB.  She is not able to exercise now since she has the lg o2 tanks.  Is going to get a small one.   She is due prevnar 20 and dexa scan.   Reviewed lab with pt. GFR decreased but stable at 40. She is not drinking water regularly.  She does not see neph.  Corrected ca++, CMP is wnl except glucose elevated. Cr is wnl but has been increasing slowly.  She does not see neph  A1c in office today is 6.4.  that is down from 6.7 last lab in april. She is stable on metformin and januvia.  Taking meds approp.  Tries to remain active.     LP shows trg down from 274, HDL stable at 50.  LDL at goal at 72.  That is up from 59 last time. Goal is <100.  She has been on a healthy diet. Loosing wt since she lost her .  BP stable and well controlled on losartan HCT.  Taking med approp.  No s/e to med.         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    Hyperlipidemia LDL goal <70 08/24/2021    Stage 3b chronic kidney disease (HCC)     DM (diabetes mellitus) type II, controlled, with peripheral vascular disorder (HCC) 09/16/2018    Age-related cataract of both eyes     Vitamin D deficiency     Esophageal stricture     Diverticulosis     Osteopenia of multiple sites     Hiatal hernia     Arthritis     Glaucoma     Essential hypertension, benign 09/04/2012    COPD (chronic obstructive pulmonary disease) (HCC) 04/05/2012    GERD (gastroesophageal reflux disease) 07/24/2009       Current medicines (including changes  today)  Current Outpatient Medications   Medication Sig Dispense Refill    benzonatate (TESSALON) 100 MG Cap Take 1 Capsule by mouth 3 times a day as needed for Cough. 60 Capsule 0    SYMBICORT 160-4.5 MCG/ACT Aerosol Inhale 2 Puffs 2 times a day. 3 Each 3    felodipine ER (PLENDIL) 10 MG TABLET SR 24 HR Take 1 Tablet by mouth every day. 90 Tablet 3    felodipine ER (PLENDIL) 2.5 MG TABLET SR 24 HR Take 1 Tablet by mouth every day. 90 Tablet 3    rabeprazole (ACIPHEX) 20 MG tablet Take 1 Tablet by mouth every day. Take on empty stomach in AM 90 Tablet 3    SITagliptin (JANUVIA) 100 MG Tab Take 1 Tablet by mouth every day. 90 Tablet 3    metFORMIN (GLUCOPHAGE) 500 MG Tab Take 1 Tablet by mouth 2 times a day with meals. 180 Tablet 3    losartan-hydrochlorothiazide (HYZAAR) 50-12.5 MG per tablet Take 1 Tablet by mouth every day. 90 Tablet 3    albuterol (PROAIR HFA) 108 (90 Base) MCG/ACT Aero Soln inhalation aerosol Inhale 2 Puffs every 6 hours as needed for Shortness of Breath. 3 Each 3    fluticasone (FLONASE) 50 MCG/ACT nasal spray Administer 2 Sprays into affected nostril(S) every day. 9.9 mL 6    fenofibrate (TRIGLIDE) 160 MG tablet Take 1 Tablet by mouth every day. 90 Tablet 3    Glucosamine HCl (GLUCOSAMINE PO) Take  by mouth.      Cholecalciferol (VITAMIN D3) 2000 UNIT Cap Take  by mouth.      acetaminophen (TYLENOL) 325 MG Tab Take 650 mg by mouth every four hours as needed.      Cyanocobalamin (B-12) 2500 MCG Tab Take  by mouth.       No current facility-administered medications for this visit.       Allergies   Allergen Reactions    Stiolto Respimat [Tiotropium Bromide-Olodaterol]      Cannot urinate    Sulfa Drugs        ROS  Constitutional: Negative. Negative for fever, chills, weight loss, malaise/fatigue and diaphoresis.   HENT: Negative. Negative for hearing loss, ear pain, nosebleeds, congestion, sore throat, neck pain, tinnitus and ear discharge.   Respiratory: Negative. Negative for cough,  "hemoptysis, sputum production, shortness of breath, wheezing and stridor.   Cardiovascular: Negative. Negative for chest pain, palpitations, orthopnea, claudication, leg swelling and PND.   Gastrointestinal: Denies nausea, vomiting, diarrhea, constipation, heartburn, melena or hematochezia.  Genitourinary: Denies dysuria, hematuria, urinary incontinence, frequency or urgency.   Monofilament foot exam:  2+ micky pedal pulses.  Sensation intact micky LE.  No macerations or ulcerations.         Objective:     /62   Pulse 65   Temp 36.1 °C (97 °F) (Temporal)   Ht 1.63 m (5' 4.17\")   Wt 73.9 kg (163 lb)   SpO2 95%  Body mass index is 27.83 kg/m².    Physical Exam:  Vitals reviewed.  Constitutional: Oriented to person, place, and time. appears well-developed and well-nourished. No distress.   Cardiovascular: Normal rate, regular rhythm, normal heart sounds and intact distal pulses. Exam reveals no gallop and no friction rub. No murmur heard. No carotid bruits.   Pulmonary/Chest: Effort normal and breath sounds normal. No stridor. No respiratory distress. no wheezes or rales. exhibits no tenderness.   Musculoskeletal: Normal range of motion. exhibits no edema. micky pedal pulses 2+.  Lymphadenopathy: No cervical or supraclavicular adenopathy.   Neurological: Alert and oriented to person, place, and time. exhibits normal muscle tone.  Skin: Skin is warm and dry. No diaphoresis.   Psychiatric: Normal mood and affect. Behavior is normal.   Monofilament foot exam:  2+ micky pedal pulses.  Sensation intact micky LE.  No macerations or ulcerations.    A1c in office 6.4.       Assessment and Plan:     The following treatment plan was discussed:    1. DM (diabetes mellitus) type II, controlled, with peripheral vascular disorder (HCC)  Diabetic Monofilament LE Exam    Comp Metabolic Panel    HEMOGLOBIN A1C    MICROALBUMIN CREAT RATIO URINE    POCT Hemoglobin A1C    a1c improved. tam lab and f/u for review 6 mo. continue healthy " diet and start regular exercise.       2. Essential hypertension, benign  Comp Metabolic Panel    MICROALBUMIN CREAT RATIO URINE    stable and well controlled on med.        3. Stage 3b chronic kidney disease (HCC)  Comp Metabolic Panel    GFR still down at 40.  cr is wnl but has been increasing. tam lab, f/u for review 6 mo      4. Hypertriglyceridemia  Comp Metabolic Panel    Lipid Profile    trg are improved. continue healthy diet and regular exercise. plan: tam lab 6 mo. f/u for review.       5. History of tobacco use      quit smoking 30 years ago. does not qualify for lung cancer screening.       6. Other emphysema (HCC)      getting over recent severe exacerbation.  stil lrequiring o2 24/7 on some days. o2 sat wnl today on RA      7. Post menopausal problems  DS-BONE DENSITY STUDY (DEXA)    dexa scan ordered      8. Need for pneumococcal 20-valent conjugate vaccination  Pneumococcal Conjugate Vaccine 20-Valent (19 yrs+)    prevnar 20 given today            Followup: Return in about 6 months (around 1/18/2024), or for lab review.

## 2023-07-19 ENCOUNTER — APPOINTMENT (OUTPATIENT)
Dept: MEDICAL GROUP | Facility: MEDICAL CENTER | Age: 88
End: 2023-07-19
Payer: MEDICARE

## 2023-09-27 DIAGNOSIS — J43.8 OTHER EMPHYSEMA (HCC): ICD-10-CM

## 2023-09-27 RX ORDER — BUDESONIDE AND FORMOTEROL FUMARATE DIHYDRATE 160; 4.5 UG/1; UG/1
2 AEROSOL RESPIRATORY (INHALATION) 2 TIMES DAILY
Qty: 3 EACH | Refills: 3 | Status: SHIPPED | OUTPATIENT
Start: 2023-09-27

## 2023-10-20 DIAGNOSIS — I10 ESSENTIAL HYPERTENSION, BENIGN: ICD-10-CM

## 2023-10-20 RX ORDER — FELODIPINE 10 MG/1
10 TABLET, EXTENDED RELEASE ORAL
Qty: 90 TABLET | Refills: 3 | Status: SHIPPED | OUTPATIENT
Start: 2023-10-20

## 2023-12-11 ENCOUNTER — HOSPITAL ENCOUNTER (OUTPATIENT)
Dept: RADIOLOGY | Facility: MEDICAL CENTER | Age: 88
End: 2023-12-11
Attending: NURSE PRACTITIONER
Payer: MEDICARE

## 2023-12-11 DIAGNOSIS — N95.9 POST MENOPAUSAL PROBLEMS: ICD-10-CM

## 2023-12-11 PROCEDURE — 77080 DXA BONE DENSITY AXIAL: CPT

## 2023-12-12 ENCOUNTER — OFFICE VISIT (OUTPATIENT)
Dept: MEDICAL GROUP | Facility: MEDICAL CENTER | Age: 88
End: 2023-12-12
Payer: MEDICARE

## 2023-12-12 ENCOUNTER — HOSPITAL ENCOUNTER (OUTPATIENT)
Dept: RADIOLOGY | Facility: MEDICAL CENTER | Age: 88
End: 2023-12-12
Attending: INTERNAL MEDICINE
Payer: MEDICARE

## 2023-12-12 ENCOUNTER — HOSPITAL ENCOUNTER (OUTPATIENT)
Dept: LAB | Facility: MEDICAL CENTER | Age: 88
End: 2023-12-12
Attending: INTERNAL MEDICINE
Payer: MEDICARE

## 2023-12-12 ENCOUNTER — TELEPHONE (OUTPATIENT)
Dept: MEDICAL GROUP | Facility: MEDICAL CENTER | Age: 88
End: 2023-12-12

## 2023-12-12 VITALS
OXYGEN SATURATION: 95 % | SYSTOLIC BLOOD PRESSURE: 144 MMHG | DIASTOLIC BLOOD PRESSURE: 80 MMHG | HEART RATE: 73 BPM | TEMPERATURE: 97 F | HEIGHT: 64 IN | BODY MASS INDEX: 28.17 KG/M2 | WEIGHT: 165 LBS

## 2023-12-12 DIAGNOSIS — E11.51 DM (DIABETES MELLITUS) TYPE II, CONTROLLED, WITH PERIPHERAL VASCULAR DISORDER (HCC): Chronic | ICD-10-CM

## 2023-12-12 DIAGNOSIS — R05.9 COUGH, UNSPECIFIED TYPE: ICD-10-CM

## 2023-12-12 DIAGNOSIS — E78.5 HYPERLIPIDEMIA LDL GOAL <70: ICD-10-CM

## 2023-12-12 DIAGNOSIS — R19.7 DIARRHEA, UNSPECIFIED TYPE: ICD-10-CM

## 2023-12-12 DIAGNOSIS — I44.2 COMPLETE ATRIOVENTRICULAR BLOCK (HCC): ICD-10-CM

## 2023-12-12 DIAGNOSIS — E78.5 DYSLIPIDEMIA: ICD-10-CM

## 2023-12-12 DIAGNOSIS — I10 ESSENTIAL HYPERTENSION, BENIGN: Chronic | ICD-10-CM

## 2023-12-12 LAB
ALBUMIN SERPL BCP-MCNC: 4.1 G/DL (ref 3.2–4.9)
ALBUMIN/GLOB SERPL: 1.2 G/DL
ALP SERPL-CCNC: 54 U/L (ref 30–99)
ALT SERPL-CCNC: 17 U/L (ref 2–50)
ANION GAP SERPL CALC-SCNC: 13 MMOL/L (ref 7–16)
APPEARANCE UR: CLEAR
AST SERPL-CCNC: 20 U/L (ref 12–45)
BASOPHILS # BLD AUTO: 0.3 % (ref 0–1.8)
BASOPHILS # BLD: 0.03 K/UL (ref 0–0.12)
BILIRUB SERPL-MCNC: 0.3 MG/DL (ref 0.1–1.5)
BILIRUB UR QL STRIP.AUTO: NEGATIVE
BUN SERPL-MCNC: 36 MG/DL (ref 8–22)
CALCIUM ALBUM COR SERPL-MCNC: 9.9 MG/DL (ref 8.5–10.5)
CALCIUM SERPL-MCNC: 10 MG/DL (ref 8.4–10.2)
CHLORIDE SERPL-SCNC: 108 MMOL/L (ref 96–112)
CO2 SERPL-SCNC: 20 MMOL/L (ref 20–33)
COLOR UR: YELLOW
CREAT SERPL-MCNC: 1.61 MG/DL (ref 0.5–1.4)
CREAT UR-MCNC: 94.24 MG/DL
EOSINOPHIL # BLD AUTO: 0.14 K/UL (ref 0–0.51)
EOSINOPHIL NFR BLD: 1.3 % (ref 0–6.9)
ERYTHROCYTE [DISTWIDTH] IN BLOOD BY AUTOMATED COUNT: 45.8 FL (ref 35.9–50)
EST. AVERAGE GLUCOSE BLD GHB EST-MCNC: 131 MG/DL
GFR SERPLBLD CREATININE-BSD FMLA CKD-EPI: 31 ML/MIN/1.73 M 2
GLOBULIN SER CALC-MCNC: 3.4 G/DL (ref 1.9–3.5)
GLUCOSE SERPL-MCNC: 119 MG/DL (ref 65–99)
GLUCOSE UR STRIP.AUTO-MCNC: NEGATIVE MG/DL
HBA1C MFR BLD: 6.2 % (ref 4–5.6)
HCT VFR BLD AUTO: 40.5 % (ref 37–47)
HGB BLD-MCNC: 12.6 G/DL (ref 12–16)
IMM GRANULOCYTES # BLD AUTO: 0.06 K/UL (ref 0–0.11)
IMM GRANULOCYTES NFR BLD AUTO: 0.5 % (ref 0–0.9)
KETONES UR STRIP.AUTO-MCNC: NEGATIVE MG/DL
LEUKOCYTE ESTERASE UR QL STRIP.AUTO: NEGATIVE
LYMPHOCYTES # BLD AUTO: 2.21 K/UL (ref 1–4.8)
LYMPHOCYTES NFR BLD: 20.1 % (ref 22–41)
MCH RBC QN AUTO: 27.6 PG (ref 27–33)
MCHC RBC AUTO-ENTMCNC: 31.1 G/DL (ref 32.2–35.5)
MCV RBC AUTO: 88.8 FL (ref 81.4–97.8)
MICRO URNS: NORMAL
MICROALBUMIN UR-MCNC: <1.2 MG/DL
MICROALBUMIN/CREAT UR: NORMAL MG/G (ref 0–30)
MONOCYTES # BLD AUTO: 0.49 K/UL (ref 0–0.85)
MONOCYTES NFR BLD AUTO: 4.5 % (ref 0–13.4)
NEUTROPHILS # BLD AUTO: 8.05 K/UL (ref 1.82–7.42)
NEUTROPHILS NFR BLD: 73.3 % (ref 44–72)
NITRITE UR QL STRIP.AUTO: NEGATIVE
NRBC # BLD AUTO: 0 K/UL
NRBC BLD-RTO: 0 /100 WBC (ref 0–0.2)
PH UR STRIP.AUTO: 5.5 [PH] (ref 5–8)
PLATELET # BLD AUTO: 274 K/UL (ref 164–446)
PMV BLD AUTO: 10.1 FL (ref 9–12.9)
POTASSIUM SERPL-SCNC: 4.5 MMOL/L (ref 3.6–5.5)
PROT SERPL-MCNC: 7.5 G/DL (ref 6–8.2)
PROT UR QL STRIP: NEGATIVE MG/DL
RBC # BLD AUTO: 4.56 M/UL (ref 4.2–5.4)
RBC UR QL AUTO: NEGATIVE
SODIUM SERPL-SCNC: 141 MMOL/L (ref 135–145)
SP GR UR STRIP.AUTO: 1.02
TSH SERPL DL<=0.005 MIU/L-ACNC: 2.4 UIU/ML (ref 0.38–5.33)
WBC # BLD AUTO: 11 K/UL (ref 4.8–10.8)

## 2023-12-12 PROCEDURE — 82043 UR ALBUMIN QUANTITATIVE: CPT

## 2023-12-12 PROCEDURE — 85025 COMPLETE CBC W/AUTO DIFF WBC: CPT

## 2023-12-12 PROCEDURE — 81003 URINALYSIS AUTO W/O SCOPE: CPT

## 2023-12-12 PROCEDURE — 82570 ASSAY OF URINE CREATININE: CPT

## 2023-12-12 PROCEDURE — 84443 ASSAY THYROID STIM HORMONE: CPT

## 2023-12-12 PROCEDURE — 71046 X-RAY EXAM CHEST 2 VIEWS: CPT

## 2023-12-12 PROCEDURE — 3077F SYST BP >= 140 MM HG: CPT | Performed by: INTERNAL MEDICINE

## 2023-12-12 PROCEDURE — 80053 COMPREHEN METABOLIC PANEL: CPT

## 2023-12-12 PROCEDURE — 99214 OFFICE O/P EST MOD 30 MIN: CPT | Performed by: INTERNAL MEDICINE

## 2023-12-12 PROCEDURE — 3079F DIAST BP 80-89 MM HG: CPT | Performed by: INTERNAL MEDICINE

## 2023-12-12 PROCEDURE — 83036 HEMOGLOBIN GLYCOSYLATED A1C: CPT | Mod: GA

## 2023-12-12 PROCEDURE — 36415 COLL VENOUS BLD VENIPUNCTURE: CPT | Mod: GA

## 2023-12-12 RX ORDER — AZITHROMYCIN 250 MG/1
TABLET, FILM COATED ORAL
Qty: 6 TABLET | Refills: 0 | Status: SHIPPED | OUTPATIENT
Start: 2023-12-12 | End: 2024-02-01

## 2023-12-12 RX ORDER — PREDNISONE 20 MG/1
TABLET ORAL
Qty: 14 TABLET | Refills: 0 | Status: SHIPPED | OUTPATIENT
Start: 2023-12-12 | End: 2024-02-01

## 2023-12-12 NOTE — PROGRESS NOTES
Subjective     Kerri Newell is a 88 y.o. female who presents with Follow-Up (Covid /)            HPI      Patient seen status normally.  States that she had a recent COVID infection.  Was on a cruise out of town to [place] from November 18 through 28.  She started to have symptoms on November 27, sore throat, runny nose, productive cough and shortness of breath with exertion, body aches, fatigue, no chest pain, no hemoptysis, no fevers or chills, no change in taste or smell, tested positive for COVID after returning home from her trip on November 29.  She continued to test and tested negative for COVID last week on a home test.  She has underlying COPD, she sees [facility] pulmonary I cannot find any medical records from that group.  She states that she took a prednisone Medrol Dosepak on November 29 as she had an old prescription at home.  That did improve her breathing.  Since then her breathing has improved but she still has the cough more dry than productive.  Cough is not related to position or cold weather exacerbation,  she states she is on Symbicort normally and takes it every day and uses albuterol as needed.  Normally she does not need to use the albuterol on a regular basis but since testing positive for COVID and becoming symptomatic she has been using albuterol rescue inhaler more regularly 3-4 times a day.  No tobacco.  Normally just on oxygen at night but since she got sick she has been using oxygen during the day as well.  History of pacemaker per [facility] cardiology, she states she needs referral back to  with left [facility] and is now with [facility], she also has hypertension, diabetes.  She remains on metformin 500 mg twice a day, Januvia, and losartan HCT, Plendil for blood pressure.  Blood pressures have been running 120s to 140s, blood sugars running 140 in the morning.  Also has had diarrhea starting 3 months ago initially 1-2 times per week but  since COVID has been having diarrhea 2-3 loose stools per day, no blood in her stool, no abdominal pain, no recent antibiotics.  Medications, allergies, medical history, surgical history, social history, family history  reviewed and updated  Medications, allergies, medical history, surgical history, social history, family history  reviewed and updated      Current Outpatient Medications   Medication Sig Dispense Refill    predniSONE (DELTASONE) 20 MG Tab Take 2 po qday x 4 days, then 1 po qday x 4 days, then 1/2 per day x 4 days 14 Tablet 0    azithromycin (ZITHROMAX Z-SCOOTER) 250 MG Tab 2 tabs by mouth day 1, 1 tab by mouth days 2-5 6 Tablet 0    felodipine ER (PLENDIL) 10 MG TABLET SR 24 HR TAKE 1 TABLET BY MOUTH EVERY DAY 90 Tablet 3    SYMBICORT 160-4.5 MCG/ACT Aerosol Inhale 2 Puffs 2 times a day. 3 Each 3    Amoxicillin 500 MG Tab TAKE FOUR TABLETS BY MOUTH 1 HOUR BEFORE DENTAL APPOINTMENT 12 Tablet 1    benzonatate (TESSALON) 100 MG Cap Take 1 Capsule by mouth 3 times a day as needed for Cough. 60 Capsule 0    felodipine ER (PLENDIL) 2.5 MG TABLET SR 24 HR Take 1 Tablet by mouth every day. 90 Tablet 3    rabeprazole (ACIPHEX) 20 MG tablet Take 1 Tablet by mouth every day. Take on empty stomach in AM 90 Tablet 3    SITagliptin (JANUVIA) 100 MG Tab Take 1 Tablet by mouth every day. 90 Tablet 3    metFORMIN (GLUCOPHAGE) 500 MG Tab Take 1 Tablet by mouth 2 times a day with meals. 180 Tablet 3    losartan-hydrochlorothiazide (HYZAAR) 50-12.5 MG per tablet Take 1 Tablet by mouth every day. 90 Tablet 3    albuterol (PROAIR HFA) 108 (90 Base) MCG/ACT Aero Soln inhalation aerosol Inhale 2 Puffs every 6 hours as needed for Shortness of Breath. 3 Each 3    fluticasone (FLONASE) 50 MCG/ACT nasal spray Administer 2 Sprays into affected nostril(S) every day. 9.9 mL 6    fenofibrate (TRIGLIDE) 160 MG tablet Take 1 Tablet by mouth every day. 90 Tablet 3    Glucosamine HCl (GLUCOSAMINE PO) Take  by mouth.      Cholecalciferol  "(VITAMIN D3) 2000 UNIT Cap Take  by mouth.      acetaminophen (TYLENOL) 325 MG Tab Take 650 mg by mouth every four hours as needed.      Cyanocobalamin (B-12) 2500 MCG Tab Take  by mouth.       No current facility-administered medications for this visit.     .  Patient Active Problem List   Diagnosis    GERD (gastroesophageal reflux disease)    COPD (chronic obstructive pulmonary disease) (HCC)    Essential hypertension, benign    Esophageal stricture    Diverticulosis    Osteopenia of multiple sites    Hiatal hernia    Arthritis    Glaucoma    Age-related cataract of both eyes    Vitamin D deficiency    DM (diabetes mellitus) type II, controlled, with peripheral vascular disorder (HCC)    Stage 3b chronic kidney disease (HCC)    Hyperlipidemia LDL goal <70    Complete atrioventricular block (HCC)         ROS           Objective     BP (!) 144/80   Pulse 73   Temp 36.1 °C (97 °F) (Temporal)   Ht 1.63 m (5' 4.17\")   Wt 74.8 kg (165 lb)   SpO2 95% Comment: on 2 liters  BMI 28.17 kg/m²      Physical Exam  Vitals and nursing note reviewed.   Constitutional:       General: She is not in acute distress.     Appearance: Normal appearance. She is not ill-appearing.   HENT:      Head: Normocephalic and atraumatic.      Right Ear: Tympanic membrane and external ear normal.      Left Ear: Tympanic membrane and external ear normal.      Mouth/Throat:      Mouth: Mucous membranes are moist.      Pharynx: No oropharyngeal exudate or posterior oropharyngeal erythema.   Eyes:      Conjunctiva/sclera: Conjunctivae normal.   Cardiovascular:      Rate and Rhythm: Normal rate and regular rhythm.      Heart sounds: Normal heart sounds. No murmur heard.  Pulmonary:      Effort: Pulmonary effort is normal. No respiratory distress.      Breath sounds: Normal breath sounds. No wheezing or rales.   Abdominal:      General: There is no distension.      Tenderness: There is no guarding.   Musculoskeletal:         General: No swelling.    "   Cervical back: Neck supple. No rigidity.   Lymphadenopathy:      Cervical: No cervical adenopathy.   Skin:     Coloration: Skin is not jaundiced.      Findings: No bruising.   Neurological:      General: No focal deficit present.      Mental Status: She is alert.   Psychiatric:         Mood and Affect: Mood normal.         Behavior: Behavior normal.     No lower extremity edema    Assessment & Plan        Assessment  #!  Recent COVID infection testing positive November 29, subsequently testing negative last week, she not receive any treatment although she did take a Medrol Dosepak which helped with her symptoms.  Underlying COPD on 2 L at night but she is using oxygen 2 L continuously since she tested positive for covid, normal saturation on 2 L today, lungs are clear, no evidence of fluid overload on exam.  Consider COVID associated pneumonia, COPD exacerbation, no history of CHF, no chest pain to suggest CAD, no history of pulmonary embolism or DVT    #2 COPD on Symbicort normally and albuterol for [facility] pulmonary group I have no records from that group, cannot find a recent pulmonary function test result    #3 diabetes, blood sugars at home have been stable, last A1c in April was 6.7%, on Glucophage, Januvia    #4 hypertension followed by cardiology on losartan HCT and felodipine    #5 pacemaker per cardiology      #6 CKD 3B, GFR 40 in July no regular NSAIDs    #7 Daily loose stools, no melena, she had been having loose stools weekly prior to onset of COVID but now she is having 2-3 loose stools a day, no recent antibiotics, she has traveled recently on a cruise, question COVID-related diarrhea    Plan  #1 chest x-ray    #2 continue oxygen 2 L continuously    #3 continue Symbicort twice daily and albuterol as needed    #4 need old records from [facility] pulmonary as well as or any old pulmonary function testing and old imaging studies from [facility]    #5 referral back to  now  at [facility] cardiology    #6 continue checking blood sugars daily    #7 continue checking blood pressures daily    #8 Labs    #9 Zithromax she has had this before, can cause nausea, diarrhea, rash    #10 prednisone high-dose longer taper starting 40 mg x 4 days then 20 mg x 4 days then 10 mg x 4 days medication will cause her sugars to be elevated, monitor for GI upset    #11 continue no NSAIDs    #12 any worsening symptoms to the ER    #13 follow-up with her primary care provider 4 weeks

## 2023-12-13 NOTE — TELEPHONE ENCOUNTER
Notified with labs A1c stable, CKD, creatinine increased compared to baseline question whether that is due to dehydration from recent loose stools, COPD exacerbation as white blood cell count mildly elevated 11.0, no anemia, urinalysis shows no evidence of infection.  Advised to schedule follow-up with Adrienne her primary care provider in 4 weeks to follow-up on this test.  Continue no NSAIDs.  Ensure adequate hydration with water.

## 2023-12-14 ENCOUNTER — HOSPITAL ENCOUNTER (OUTPATIENT)
Facility: MEDICAL CENTER | Age: 88
End: 2023-12-14
Attending: INTERNAL MEDICINE
Payer: MEDICARE

## 2023-12-14 DIAGNOSIS — R19.7 DIARRHEA, UNSPECIFIED TYPE: ICD-10-CM

## 2023-12-14 LAB
G LAMBLIA+C PARVUM AG STL QL RAPID: NORMAL
SIGNIFICANT IND 70042: NORMAL
SITE SITE: NORMAL
SOURCE SOURCE: NORMAL

## 2023-12-14 PROCEDURE — 87329 GIARDIA AG IA: CPT

## 2023-12-14 PROCEDURE — 87046 STOOL CULTR AEROBIC BACT EA: CPT

## 2023-12-14 PROCEDURE — 83993 ASSAY FOR CALPROTECTIN FECAL: CPT

## 2023-12-14 PROCEDURE — 83630 LACTOFERRIN FECAL (QUAL): CPT

## 2023-12-14 PROCEDURE — 87328 CRYPTOSPORIDIUM AG IA: CPT

## 2023-12-14 PROCEDURE — 87045 FECES CULTURE AEROBIC BACT: CPT

## 2023-12-14 PROCEDURE — 87899 AGENT NOS ASSAY W/OPTIC: CPT

## 2023-12-14 PROCEDURE — 82653 EL-1 FECAL QUANTITATIVE: CPT

## 2023-12-15 LAB
E COLI SXT1+2 STL IA: NORMAL
SIGNIFICANT IND 70042: NORMAL
SITE SITE: NORMAL
SOURCE SOURCE: NORMAL

## 2023-12-16 LAB — LACTOFERRIN STL QL IA: NEGATIVE

## 2023-12-17 LAB
BACTERIA STL CULT: NORMAL
CALPROTECTIN STL-MCNT: 194 UG/G
E COLI SXT1+2 STL IA: NORMAL
ELASTASE PANC STL-MCNT: 652 UG/G
SIGNIFICANT IND 70042: NORMAL
SITE SITE: NORMAL
SOURCE SOURCE: NORMAL

## 2024-01-03 ENCOUNTER — TELEPHONE (OUTPATIENT)
Dept: MEDICAL GROUP | Facility: MEDICAL CENTER | Age: 89
End: 2024-01-03
Payer: MEDICARE

## 2024-01-25 ENCOUNTER — HOSPITAL ENCOUNTER (OUTPATIENT)
Dept: LAB | Facility: MEDICAL CENTER | Age: 89
End: 2024-01-25
Attending: NURSE PRACTITIONER
Payer: MEDICARE

## 2024-01-25 DIAGNOSIS — N18.32 STAGE 3B CHRONIC KIDNEY DISEASE: ICD-10-CM

## 2024-01-25 DIAGNOSIS — E11.51 DM (DIABETES MELLITUS) TYPE II, CONTROLLED, WITH PERIPHERAL VASCULAR DISORDER (HCC): ICD-10-CM

## 2024-01-25 DIAGNOSIS — E78.1 HYPERTRIGLYCERIDEMIA: ICD-10-CM

## 2024-01-25 DIAGNOSIS — I10 ESSENTIAL HYPERTENSION, BENIGN: ICD-10-CM

## 2024-01-25 LAB
CHOLEST SERPL-MCNC: 174 MG/DL (ref 100–199)
CREAT UR-MCNC: 115.24 MG/DL
HDLC SERPL-MCNC: 46 MG/DL
LDLC SERPL CALC-MCNC: 86 MG/DL
MICROALBUMIN UR-MCNC: 1.3 MG/DL
MICROALBUMIN/CREAT UR: 11 MG/G (ref 0–30)
TRIGL SERPL-MCNC: 210 MG/DL (ref 0–149)

## 2024-01-25 PROCEDURE — 82570 ASSAY OF URINE CREATININE: CPT

## 2024-01-25 PROCEDURE — 80061 LIPID PANEL: CPT

## 2024-01-25 PROCEDURE — 36415 COLL VENOUS BLD VENIPUNCTURE: CPT

## 2024-01-25 PROCEDURE — 82043 UR ALBUMIN QUANTITATIVE: CPT

## 2024-02-01 ENCOUNTER — OFFICE VISIT (OUTPATIENT)
Dept: MEDICAL GROUP | Facility: MEDICAL CENTER | Age: 89
End: 2024-02-01
Payer: MEDICARE

## 2024-02-01 VITALS
OXYGEN SATURATION: 92 % | HEIGHT: 66 IN | HEART RATE: 68 BPM | SYSTOLIC BLOOD PRESSURE: 144 MMHG | WEIGHT: 168.44 LBS | TEMPERATURE: 97.6 F | BODY MASS INDEX: 27.07 KG/M2 | DIASTOLIC BLOOD PRESSURE: 58 MMHG

## 2024-02-01 DIAGNOSIS — I10 ESSENTIAL HYPERTENSION, BENIGN: ICD-10-CM

## 2024-02-01 DIAGNOSIS — N18.32 STAGE 3B CHRONIC KIDNEY DISEASE: ICD-10-CM

## 2024-02-01 DIAGNOSIS — E11.51 DM (DIABETES MELLITUS) TYPE II, CONTROLLED, WITH PERIPHERAL VASCULAR DISORDER (HCC): ICD-10-CM

## 2024-02-01 DIAGNOSIS — J44.1 CHRONIC OBSTRUCTIVE PULMONARY DISEASE WITH ACUTE EXACERBATION (HCC): ICD-10-CM

## 2024-02-01 DIAGNOSIS — G93.32 COVID-19 LONG HAULER MANIFESTING CHRONIC FATIGUE: ICD-10-CM

## 2024-02-01 DIAGNOSIS — U09.9 COVID-19 LONG HAULER MANIFESTING CHRONIC FATIGUE: ICD-10-CM

## 2024-02-01 PROCEDURE — 99214 OFFICE O/P EST MOD 30 MIN: CPT | Performed by: NURSE PRACTITIONER

## 2024-02-01 PROCEDURE — 3077F SYST BP >= 140 MM HG: CPT | Performed by: NURSE PRACTITIONER

## 2024-02-01 PROCEDURE — 3078F DIAST BP <80 MM HG: CPT | Performed by: NURSE PRACTITIONER

## 2024-02-01 ASSESSMENT — FIBROSIS 4 INDEX: FIB4 SCORE: 1.52

## 2024-02-01 NOTE — PROGRESS NOTES
Subjective:     Kerri Newell is a 88 y.o. female who presents with HTN.    HPI:   Seen in f/u for HTN.  She had COVID in early dec.  She is still feeling bad from that.  Since her episode of COVID her bp has been elevated.  She saw Zoe last week.  Her bp was elevated since covid.  She is still feeling lightheaded and fatigued.  Zoe thinks she has long COVID.  He wants me to call with her bp today. She is going to resee him in 2 wks.  He changed her bp med. She was given prednisone during one of her multiple ER visits.  That helped almost immediately.  She will get SOB with mild exertion.  She has been taking bp at home.  Its been -136.   She is going to see pulmonary in 3 wks.  They are going to see if she needs her pulm meds need changing.   Reviewed lab with pt.  A1c down from 6.7 to 6.2.  stable on januvia and metformin.  Following a healthy diet.  Not exercising d/t fatigue.  GFR is down from 40 over the last year to 31.  Not drinking enough fluids. She is followed by neph.  Alb/cr is wnl but up from 0 to 11.  It has been previously elevated at 50 & 62 in 2022.  UA wnl.  TSH is wnl.  CBC shows elevated neutrophils and dec lymphs but o/w wnl.  This lab was done shortly after having COVID.  CMP shows elevated glucose and cr up from 1.31 to 1.61.  o/w wnl.  LP shows trg high chronically at 210. HDL & LDL at goal.      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    Hyperlipidemia LDL goal <70 08/24/2021    Stage 3b chronic kidney disease (HCC)     DM (diabetes mellitus) type II, controlled, with peripheral vascular disorder (HCC) 09/16/2018    Age-related cataract of both eyes     Vitamin D deficiency     Esophageal stricture     Diverticulosis     Osteopenia of multiple sites     Hiatal hernia     Arthritis     Glaucoma     Essential hypertension, benign 09/04/2012    GERD (gastroesophageal reflux disease) 07/24/2009        Current medicines (including changes today)  Current Outpatient Medications   Medication Sig Dispense Refill    felodipine ER (PLENDIL) 10 MG TABLET SR 24 HR TAKE 1 TABLET BY MOUTH EVERY DAY 90 Tablet 3    SYMBICORT 160-4.5 MCG/ACT Aerosol Inhale 2 Puffs 2 times a day. 3 Each 3    Amoxicillin 500 MG Tab TAKE FOUR TABLETS BY MOUTH 1 HOUR BEFORE DENTAL APPOINTMENT 12 Tablet 1    benzonatate (TESSALON) 100 MG Cap Take 1 Capsule by mouth 3 times a day as needed for Cough. 60 Capsule 0    felodipine ER (PLENDIL) 2.5 MG TABLET SR 24 HR Take 1 Tablet by mouth every day. 90 Tablet 3    rabeprazole (ACIPHEX) 20 MG tablet Take 1 Tablet by mouth every day. Take on empty stomach in AM 90 Tablet 3    SITagliptin (JANUVIA) 100 MG Tab Take 1 Tablet by mouth every day. 90 Tablet 3    metFORMIN (GLUCOPHAGE) 500 MG Tab Take 1 Tablet by mouth 2 times a day with meals. 180 Tablet 3    albuterol (PROAIR HFA) 108 (90 Base) MCG/ACT Aero Soln inhalation aerosol Inhale 2 Puffs every 6 hours as needed for Shortness of Breath. 3 Each 3    fluticasone (FLONASE) 50 MCG/ACT nasal spray Administer 2 Sprays into affected nostril(S) every day. 9.9 mL 6    fenofibrate (TRIGLIDE) 160 MG tablet Take 1 Tablet by mouth every day. 90 Tablet 3    Glucosamine HCl (GLUCOSAMINE PO) Take  by mouth.      Cholecalciferol (VITAMIN D3) 2000 UNIT Cap Take  by mouth.      acetaminophen (TYLENOL) 325 MG Tab Take 650 mg by mouth every four hours as needed.      Cyanocobalamin (B-12) 2500 MCG Tab Take  by mouth.       No current facility-administered medications for this visit.       Allergies   Allergen Reactions    Stiolto Respimat [Tiotropium Bromide-Olodaterol]      Cannot urinate    Sulfa Drugs        ROS  Constitutional: Negative. Negative for fever, chills, weight loss, malaise/fatigue and diaphoresis.   HENT: Negative. Negative for hearing loss, ear pain, nosebleeds, congestion, sore throat, neck pain, tinnitus and ear discharge.   Respiratory: Negative.  "Negative for cough, hemoptysis, sputum production, shortness of breath, wheezing and stridor.   Cardiovascular: Negative. Negative for chest pain, palpitations, orthopnea, claudication, leg swelling and PND.   Gastrointestinal: Denies nausea, vomiting, diarrhea, constipation, heartburn, melena or hematochezia.  Genitourinary: Denies dysuria, hematuria, urinary incontinence, frequency or urgency.        Objective:     BP (!) 144/58 (BP Location: Left arm, Patient Position: Sitting, BP Cuff Size: Adult)   Pulse 68   Temp 36.4 °C (97.6 °F) (Temporal)   Ht 1.676 m (5' 6\")   Wt 76.4 kg (168 lb 7 oz)   SpO2 92%  Body mass index is 27.19 kg/m².    Physical Exam:  Vitals reviewed.  Constitutional: Oriented to person, place, and time. appears well-developed and well-nourished. No distress.   Cardiovascular: Normal rate, regular rhythm, normal heart sounds and intact distal pulses. Exam reveals no gallop and no friction rub. No murmur heard. No carotid bruits.   Pulmonary/Chest: Effort normal and breath sounds normal. No stridor. No respiratory distress. no wheezes or rales. exhibits no tenderness.   Musculoskeletal: Normal range of motion. exhibits no edema. micky pedal pulses 2+.  Lymphadenopathy: No cervical or supraclavicular adenopathy.   Neurological: Alert and oriented to person, place, and time. exhibits normal muscle tone.  Skin: Skin is warm and dry. No diaphoresis.   Psychiatric: Normal mood and affect. Behavior is normal.      Assessment and Plan:     The following treatment plan was discussed:    1. Essential hypertension, benign      poorly controlled. will contact Dr Enriquez per his request w/current BP today. she will f/u with him as sched for BP control.      2. DM (diabetes mellitus) type II, controlled, with peripheral vascular disorder (HCC)      a1c improved on healthy diet, metformin and januvia. plan: tam lab 3 mo. f/u for review      3. COVID-19 long hauler manifesting chronic fatigue      still " having elevated BP, fatigue, SOB several mo after COVID. f/u w/cardiac & pulm as sched. CBC w/abn neutrophils on COVID. will continue to monitor      4. Chronic obstructive pulmonary disease with acute exacerbation (HCC)      f/u w/putlm as sched for titration of meds d/t COVID SOB      5. Stage 3b chronic kidney disease (HCC)      enc pt to improve fluid intake. continue neph f/u            Followup: Return in about 3 months (around 5/1/2024), or call for lab slip.

## 2024-02-15 ENCOUNTER — TELEPHONE (OUTPATIENT)
Dept: MEDICAL GROUP | Facility: MEDICAL CENTER | Age: 89
End: 2024-02-15
Payer: MEDICARE

## 2024-02-16 NOTE — TELEPHONE ENCOUNTER
Please call Dr Enriquez's office and let his nurse know pts BP from last visit.  Pt states that he wanted us to let him know.  The BP was 144/58.  If someone could do that today or tomorrow that would be great.

## 2024-04-05 NOTE — TELEPHONE ENCOUNTER
Received request via: Pharmacy    Was the patient seen in the last year in this department? Yes    Does the patient have an active prescription (recently filled or refills available) for medication(s) requested? No    [facility]    Does the patient have [facility] and need 100 day supply (blood pressure, diabetes and cholesterol meds only)? Patient does not have SCP

## 2024-04-06 RX ORDER — FENOFIBRATE 160 MG/1
160 TABLET ORAL DAILY
Qty: 90 TABLET | Refills: 0 | Status: SHIPPED | OUTPATIENT
Start: 2024-04-06

## 2024-04-23 DIAGNOSIS — I10 ESSENTIAL HYPERTENSION, BENIGN: ICD-10-CM

## 2024-04-24 NOTE — TELEPHONE ENCOUNTER
Received request via: Pharmacy    Was the patient seen in the last year in this department? Yes    Does the patient have an active prescription (recently filled or refills available) for medication(s) requested? No    Pharmacy Name: [id number] - [place], [place] - [address] AT [facility] [address]     Does the patient have [facility] and need 100 day supply (blood pressure, diabetes and cholesterol meds only)? Patient does not have SCP

## 2024-04-27 DIAGNOSIS — R80.9 PROTEINURIA, UNSPECIFIED TYPE: ICD-10-CM

## 2024-04-27 DIAGNOSIS — E78.5 HYPERLIPIDEMIA LDL GOAL <70: ICD-10-CM

## 2024-04-27 DIAGNOSIS — I10 ESSENTIAL HYPERTENSION, BENIGN: ICD-10-CM

## 2024-04-27 DIAGNOSIS — E55.9 VITAMIN D DEFICIENCY: ICD-10-CM

## 2024-04-27 DIAGNOSIS — E78.1 HYPERTRIGLYCERIDEMIA: ICD-10-CM

## 2024-04-27 DIAGNOSIS — N18.32 STAGE 3B CHRONIC KIDNEY DISEASE: ICD-10-CM

## 2024-04-27 DIAGNOSIS — E11.51 DM (DIABETES MELLITUS) TYPE II, CONTROLLED, WITH PERIPHERAL VASCULAR DISORDER (HCC): ICD-10-CM

## 2024-04-27 RX ORDER — FELODIPINE 2.5 MG/1
2.5 TABLET, EXTENDED RELEASE ORAL
Qty: 30 TABLET | Refills: 0 | Status: SHIPPED | OUTPATIENT
Start: 2024-04-27

## 2024-05-01 ENCOUNTER — HOSPITAL ENCOUNTER (OUTPATIENT)
Dept: LAB | Facility: MEDICAL CENTER | Age: 89
End: 2024-05-01
Attending: NURSE PRACTITIONER
Payer: MEDICARE

## 2024-05-01 DIAGNOSIS — R80.9 PROTEINURIA, UNSPECIFIED TYPE: ICD-10-CM

## 2024-05-01 DIAGNOSIS — N18.32 STAGE 3B CHRONIC KIDNEY DISEASE: ICD-10-CM

## 2024-05-01 DIAGNOSIS — E78.5 HYPERLIPIDEMIA LDL GOAL <70: ICD-10-CM

## 2024-05-01 DIAGNOSIS — E55.9 VITAMIN D DEFICIENCY: ICD-10-CM

## 2024-05-01 DIAGNOSIS — E78.1 HYPERTRIGLYCERIDEMIA: ICD-10-CM

## 2024-05-01 DIAGNOSIS — I10 ESSENTIAL HYPERTENSION, BENIGN: ICD-10-CM

## 2024-05-01 DIAGNOSIS — E11.51 DM (DIABETES MELLITUS) TYPE II, CONTROLLED, WITH PERIPHERAL VASCULAR DISORDER (HCC): ICD-10-CM

## 2024-05-01 LAB
25(OH)D3 SERPL-MCNC: 56 NG/ML (ref 30–100)
ALBUMIN SERPL BCP-MCNC: 4.7 G/DL (ref 3.2–4.9)
ALBUMIN/GLOB SERPL: 1.7 G/DL
ALP SERPL-CCNC: 47 U/L (ref 30–99)
ALT SERPL-CCNC: 16 U/L (ref 2–50)
ANION GAP SERPL CALC-SCNC: 15 MMOL/L (ref 7–16)
AST SERPL-CCNC: 24 U/L (ref 12–45)
BASOPHILS # BLD AUTO: 0.4 % (ref 0–1.8)
BASOPHILS # BLD: 0.03 K/UL (ref 0–0.12)
BILIRUB SERPL-MCNC: 0.3 MG/DL (ref 0.1–1.5)
BUN SERPL-MCNC: 24 MG/DL (ref 8–22)
CALCIUM ALBUM COR SERPL-MCNC: 9.5 MG/DL (ref 8.5–10.5)
CALCIUM SERPL-MCNC: 10.1 MG/DL (ref 8.5–10.5)
CHLORIDE SERPL-SCNC: 105 MMOL/L (ref 96–112)
CHOLEST SERPL-MCNC: 183 MG/DL (ref 100–199)
CO2 SERPL-SCNC: 20 MMOL/L (ref 20–33)
CREAT SERPL-MCNC: 1.33 MG/DL (ref 0.5–1.4)
CREAT UR-MCNC: 123.37 MG/DL
EOSINOPHIL # BLD AUTO: 0.09 K/UL (ref 0–0.51)
EOSINOPHIL NFR BLD: 1.2 % (ref 0–6.9)
ERYTHROCYTE [DISTWIDTH] IN BLOOD BY AUTOMATED COUNT: 48.1 FL (ref 35.9–50)
EST. AVERAGE GLUCOSE BLD GHB EST-MCNC: 140 MG/DL
FASTING STATUS PATIENT QL REPORTED: NORMAL
GFR SERPLBLD CREATININE-BSD FMLA CKD-EPI: 39 ML/MIN/1.73 M 2
GLOBULIN SER CALC-MCNC: 2.8 G/DL (ref 1.9–3.5)
GLUCOSE SERPL-MCNC: 159 MG/DL (ref 65–99)
HBA1C MFR BLD: 6.5 % (ref 4–5.6)
HCT VFR BLD AUTO: 45 % (ref 37–47)
HDLC SERPL-MCNC: 49 MG/DL
HGB BLD-MCNC: 14 G/DL (ref 12–16)
IMM GRANULOCYTES # BLD AUTO: 0.03 K/UL (ref 0–0.11)
IMM GRANULOCYTES NFR BLD AUTO: 0.4 % (ref 0–0.9)
LDLC SERPL CALC-MCNC: 94 MG/DL
LYMPHOCYTES # BLD AUTO: 2.22 K/UL (ref 1–4.8)
LYMPHOCYTES NFR BLD: 29.8 % (ref 22–41)
MCH RBC QN AUTO: 27.2 PG (ref 27–33)
MCHC RBC AUTO-ENTMCNC: 31.1 G/DL (ref 32.2–35.5)
MCV RBC AUTO: 87.4 FL (ref 81.4–97.8)
MICROALBUMIN UR-MCNC: 6.3 MG/DL
MICROALBUMIN/CREAT UR: 51 MG/G (ref 0–30)
MONOCYTES # BLD AUTO: 0.33 K/UL (ref 0–0.85)
MONOCYTES NFR BLD AUTO: 4.4 % (ref 0–13.4)
NEUTROPHILS # BLD AUTO: 4.74 K/UL (ref 1.82–7.42)
NEUTROPHILS NFR BLD: 63.8 % (ref 44–72)
NRBC # BLD AUTO: 0 K/UL
NRBC BLD-RTO: 0 /100 WBC (ref 0–0.2)
PLATELET # BLD AUTO: 288 K/UL (ref 164–446)
PMV BLD AUTO: 10.6 FL (ref 9–12.9)
POTASSIUM SERPL-SCNC: 4.3 MMOL/L (ref 3.6–5.5)
PROT SERPL-MCNC: 7.5 G/DL (ref 6–8.2)
RBC # BLD AUTO: 5.15 M/UL (ref 4.2–5.4)
SODIUM SERPL-SCNC: 140 MMOL/L (ref 135–145)
TRIGL SERPL-MCNC: 202 MG/DL (ref 0–149)
WBC # BLD AUTO: 7.4 K/UL (ref 4.8–10.8)

## 2024-05-08 ENCOUNTER — OFFICE VISIT (OUTPATIENT)
Dept: MEDICAL GROUP | Facility: MEDICAL CENTER | Age: 89
End: 2024-05-08
Payer: MEDICARE

## 2024-05-08 VITALS
HEART RATE: 60 BPM | OXYGEN SATURATION: 91 % | BODY MASS INDEX: 28.48 KG/M2 | DIASTOLIC BLOOD PRESSURE: 80 MMHG | TEMPERATURE: 97.5 F | HEIGHT: 64 IN | SYSTOLIC BLOOD PRESSURE: 142 MMHG | WEIGHT: 166.8 LBS

## 2024-05-08 DIAGNOSIS — G89.29 CHRONIC PAIN OF BOTH KNEES: ICD-10-CM

## 2024-05-08 DIAGNOSIS — M25.562 CHRONIC PAIN OF BOTH KNEES: ICD-10-CM

## 2024-05-08 DIAGNOSIS — K21.9 GASTROESOPHAGEAL REFLUX DISEASE WITHOUT ESOPHAGITIS: ICD-10-CM

## 2024-05-08 DIAGNOSIS — N18.32 STAGE 3B CHRONIC KIDNEY DISEASE: ICD-10-CM

## 2024-05-08 DIAGNOSIS — I10 ESSENTIAL HYPERTENSION, BENIGN: ICD-10-CM

## 2024-05-08 DIAGNOSIS — J44.1 CHRONIC OBSTRUCTIVE PULMONARY DISEASE WITH ACUTE EXACERBATION (HCC): ICD-10-CM

## 2024-05-08 DIAGNOSIS — E78.5 HYPERLIPIDEMIA LDL GOAL <70: ICD-10-CM

## 2024-05-08 DIAGNOSIS — R80.9 PROTEINURIA, UNSPECIFIED TYPE: ICD-10-CM

## 2024-05-08 DIAGNOSIS — E11.51 DM (DIABETES MELLITUS) TYPE II, CONTROLLED, WITH PERIPHERAL VASCULAR DISORDER (HCC): ICD-10-CM

## 2024-05-08 DIAGNOSIS — M25.561 CHRONIC PAIN OF BOTH KNEES: ICD-10-CM

## 2024-05-08 PROCEDURE — 99214 OFFICE O/P EST MOD 30 MIN: CPT | Performed by: NURSE PRACTITIONER

## 2024-05-08 PROCEDURE — 3077F SYST BP >= 140 MM HG: CPT | Performed by: NURSE PRACTITIONER

## 2024-05-08 PROCEDURE — 3079F DIAST BP 80-89 MM HG: CPT | Performed by: NURSE PRACTITIONER

## 2024-05-08 RX ORDER — FELODIPINE 10 MG/1
10 TABLET, EXTENDED RELEASE ORAL
Qty: 90 TABLET | Refills: 3 | Status: SHIPPED | OUTPATIENT
Start: 2024-05-08

## 2024-05-08 RX ORDER — FLUTICASONE FUROATE AND VILANTEROL TRIFENATATE 200; 25 UG/1; UG/1
POWDER RESPIRATORY (INHALATION)
Qty: 3 EACH | Refills: 3 | Status: SHIPPED | OUTPATIENT
Start: 2024-05-08

## 2024-05-08 RX ORDER — RABEPRAZOLE SODIUM 20 MG/1
20 TABLET, DELAYED RELEASE ORAL DAILY
Qty: 90 TABLET | Refills: 3 | Status: SHIPPED | OUTPATIENT
Start: 2024-05-08

## 2024-05-08 RX ORDER — AMLODIPINE BESYLATE 2.5 MG/1
2.5 TABLET ORAL DAILY
Qty: 90 TABLET | Refills: 0 | Status: SHIPPED | OUTPATIENT
Start: 2024-05-08

## 2024-05-08 RX ORDER — VALSARTAN AND HYDROCHLOROTHIAZIDE 320; 12.5 MG/1; MG/1
1 TABLET, FILM COATED ORAL DAILY
Qty: 90 TABLET | Refills: 0
Start: 2024-05-08

## 2024-05-08 RX ORDER — FELODIPINE 2.5 MG/1
2.5 TABLET, EXTENDED RELEASE ORAL
Qty: 90 TABLET | Refills: 3 | Status: SHIPPED | OUTPATIENT
Start: 2024-05-08

## 2024-05-08 RX ORDER — FLUTICASONE FUROATE AND VILANTEROL TRIFENATATE 200; 25 UG/1; UG/1
POWDER RESPIRATORY (INHALATION)
COMMUNITY
Start: 2024-03-07 | End: 2024-05-08 | Stop reason: SDUPTHER

## 2024-05-08 ASSESSMENT — PATIENT HEALTH QUESTIONNAIRE - PHQ9: CLINICAL INTERPRETATION OF PHQ2 SCORE: 0

## 2024-05-08 ASSESSMENT — FIBROSIS 4 INDEX: FIB4 SCORE: 1.81

## 2024-05-08 NOTE — PROGRESS NOTES
Subjective:     History of Present Illness  The patient is an 89-year-old female with pronouns she and her.    Her bp has not been controlled with her SBP for last several visits.  Taking meds approp w/o s/e.  Her current medication regimen includes felodipine at 2 different doses, valsartan/HCTZ 320 mg/12.5 mg daily, Her blood pressure has consistently been in the 140s over the past few months, and she has been unable to lower it.     She recently had an eye examination, receiving injections every 2 months due to a raised area behind her eye, which has since improved.      She requests refills for all her medications.  She is stable on aciphex for her GERD. TAKING med approp.  Needs med refilled.    The patient does not currently consult with a nephrologist for her kidney function. She underwent a renal ultrasound 2 to 3 years ago, which revealed multiple cysts on both kidneys. She attempts to maintain adequate hydration, but her fluid intake was minimal during her vacation.    The patient is under the care of an endocrinologist for her diabetes. She has been consuming excessive sweets.  She is taking metformin and januvia approp.    The patient is experiencing severe bilateral knee pain, with the right knee being more affected than the left. She suspects she may have arthritis. She has not undergone an x-ray of her knees.    She is followed by pulm for her COPD.  Stable on breo ellipta. RF med    Results  Lab reviewed with pt:  GFR is 39. Has been decreasing over the last yr.   A1c is 6.5. followed by endocrinology.  Vitamin d, CBC is wnl.  CMP shows cr down from 1.61 in december to 1.33.   o/w wnl  LP shows trg up at 202.  She is chronically high w/trg.  HDL and LDL are at goal.  She is stable on triglide. Taking med approp.  Alb/cr ratio is up from 11 in january to 51.          Current medicines (including changes today)  Current Outpatient Medications   Medication Sig Dispense Refill     valsartan-hydrochlorothiazide (DIOVAN-HCT) 320-12.5 MG per tablet Take 1 Tablet by mouth every day. 90 Tablet 0    amLODIPine (NORVASC) 2.5 MG Tab Take 1 Tablet by mouth every day. 90 Tablet 0    BREO ELLIPTA 200-25 MCG/ACT AEROSOL POWDER, BREATH ACTIVATED INHALE 1 PUFF BY MOUTH DAILY AS DIRECTED 3 Each 3    metFORMIN (GLUCOPHAGE) 500 MG Tab Take 1 Tablet by mouth 2 times a day with meals. 180 Tablet 3    felodipine ER (PLENDIL) 2.5 MG TABLET SR 24 HR Take 1 Tablet by mouth every day. 90 Tablet 3    felodipine ER (PLENDIL) 10 MG TABLET SR 24 HR Take 1 Tablet by mouth every day. 90 Tablet 3    SITagliptin (JANUVIA) 100 MG Tab Take 1 Tablet by mouth every day. 90 Tablet 3    rabeprazole (ACIPHEX) 20 MG tablet Take 1 Tablet by mouth every day. Take on empty stomach in AM 90 Tablet 3    fenofibrate (TRIGLIDE) 160 MG tablet TAKE 1 TABLET BY MOUTH EVERY DAY 90 Tablet 0    Amoxicillin 500 MG Tab TAKE FOUR TABLETS BY MOUTH 1 HOUR BEFORE DENTAL APPOINTMENT 12 Tablet 1    benzonatate (TESSALON) 100 MG Cap Take 1 Capsule by mouth 3 times a day as needed for Cough. 60 Capsule 0    albuterol (PROAIR HFA) 108 (90 Base) MCG/ACT Aero Soln inhalation aerosol Inhale 2 Puffs every 6 hours as needed for Shortness of Breath. 3 Each 3    fluticasone (FLONASE) 50 MCG/ACT nasal spray Administer 2 Sprays into affected nostril(S) every day. 9.9 mL 6    Glucosamine HCl (GLUCOSAMINE PO) Take  by mouth.      Cholecalciferol (VITAMIN D3) 2000 UNIT Cap Take  by mouth.      acetaminophen (TYLENOL) 325 MG Tab Take 650 mg by mouth every four hours as needed.      Cyanocobalamin (B-12) 2500 MCG Tab Take  by mouth.       No current facility-administered medications for this visit.     She  has a past medical history of Arthritis, CATARACT, CKD (chronic kidney disease) stage 3, GFR 30-59 ml/min, Complete heart block (HCC), COPD (chronic obstructive pulmonary disease) (HCC), Diverticulosis, DM (diabetes mellitus) (HCC), Esophageal stricture, GERD  (gastroesophageal reflux disease) (7/24/2009), Glaucoma, Hiatal hernia, HTN (hypertension), Hyperlipidemia LDL goal <70 (8/24/2021), Osteopenia, and Vitamin D deficiency.    Hemoglobin A1c:  Lab Results   Component Value Date/Time    HBA1C 6.5 (H) 05/01/2024 09:34 AM    HBA1C 6.2 (H) 12/12/2023 10:59 AM    HBA1C 6.7 (H) 04/24/2023 09:52 AM       Microalbumin/creatinine Ratio:  Lab Results   Component Value Date/Time    URCREAT 123.37 05/01/2024 0934    MICROALBUR 6.3 05/01/2024 0934    MALBCRT 51 (H) 05/01/2024 0934       Lipid Panel:  Lab Results   Component Value Date/Time    CHOLSTRLTOT 183 05/01/2024 0934    TRIGLYCERIDE 202 (H) 05/01/2024 0934    LDL 94 05/01/2024 0934       Complete Blood Count:  Lab Results   Component Value Date/Time    WBC 7.4 05/01/2024 0934    RBC 5.15 05/01/2024 0934    HEMOGLOBIN 14.0 05/01/2024 0934    HEMATOCRIT 45.0 05/01/2024 0934    MCV 87.4 05/01/2024 0934    MCH 27.2 05/01/2024 0934    MCHC 31.1 (L) 05/01/2024 0934    RDW 48.1 05/01/2024 0934    MPV 10.6 05/01/2024 0934    LYMPHOCYTES 29.80 05/01/2024 0934    LYMPHS 2.22 05/01/2024 0934    MONOCYTES 4.40 05/01/2024 0934    MONOS 0.33 05/01/2024 0934    EOSINOPHILS 1.20 05/01/2024 0934    EOS 0.09 05/01/2024 0934    BASOPHILS 0.40 05/01/2024 0934    BASO 0.03 05/01/2024 0934    NRBC 0.00 05/01/2024 0934       Complete Metabolic Panel:  Lab Results   Component Value Date/Time    SODIUM 140 05/01/2024 09:34 AM    POTASSIUM 4.3 05/01/2024 09:34 AM    CHLORIDE 105 05/01/2024 09:34 AM    CO2 20 05/01/2024 09:34 AM    ANION 15.0 05/01/2024 09:34 AM    GLUCOSE 159 (H) 05/01/2024 09:34 AM    BUN 24 (H) 05/01/2024 09:34 AM    CREATININE 1.33 05/01/2024 09:34 AM    CREATININE 1.2 05/28/2009 12:00 AM    ASTSGOT 24 05/01/2024 09:34 AM    ALTSGPT 16 05/01/2024 09:34 AM    TBILIRUBIN 0.3 05/01/2024 09:34 AM    ALBUMIN 4.7 05/01/2024 09:34 AM    TOTPROTEIN 7.5 05/01/2024 09:34 AM    GLOBULIN 2.8 05/01/2024 09:34 AM    AGRATIO 1.7 05/01/2024  "09:34 AM         Vitamin D:    Lab Results   Component Value Date/Time    25HYDROXY 56 05/01/2024 0934       GFR:    Lab Results   Component Value Date/Time    GFRCKD 39 (A) 05/01/2024 0934           ROS   Review of Systems   Constitutional: Negative.  Negative for fever, chills, weight loss, malaise/fatigue and diaphoresis.   HENT: Negative.  Negative for hearing loss, ear pain, nosebleeds, congestion, sore throat, neck pain, tinnitus and ear discharge.    Respiratory: Negative.  Negative for cough, hemoptysis, sputum production, shortness of breath, wheezing and stridor.    Cardiovascular: Negative.  Negative for chest pain, palpitations, orthopnea, claudication, leg swelling and PND.   Gastrointestinal: denies nausea, vomiting, diarrhea, constipation, heartburn, melena or hematochezia.  Genitourinary: Denies dysuria, hematuria, urinary incontinence, frequency or urgency.    Musculoskeletal: Negative.  Negative for myalgias and back pain.   Neurological: Negative.  Negative for dizziness, tingling, tremors, weakness and headaches.   Psych:  Denies depression, anxiety or insomnia.  All other systems reviewed and are negative.         Objective:     BP (!) 142/80 (BP Location: Left arm, Patient Position: Sitting, BP Cuff Size: Adult)   Pulse 60   Temp 36.4 °C (97.5 °F) (Temporal)   Ht 1.63 m (5' 4.17\")   Wt 75.7 kg (166 lb 12.8 oz)   SpO2 91%  Body mass index is 28.48 kg/m².   Physical Exam  Vital Signs  The patient's systolic blood pressure is 140.  Physical Exam   Vitals reviewed.  Constitutional: oriented to person, place, and time. appears well-developed and well-nourished. No distress.   Neck: No JVD present.  Cardiovascular: Normal rate, regular rhythm, normal heart sounds and intact distal pulses.  Exam reveals no gallop and no friction rub.  No murmur heard.  No carotid bruits.   Pulmonary/Chest: Effort normal and breath sounds normal. No stridor. No respiratory distress. no wheezes or rales. exhibits " no tenderness.   Musculoskeletal: Normal range of motion. exhibits no edema. micky pedal pulses 2+.  Lymphadenopathy: no cervical or supraclavicular adenopathy.   Neurological: alert and oriented to person, place, and time. exhibits normal muscle tone. Coordination normal.   Skin: Skin is warm and dry. no diaphoresis.   Psychiatric: normal mood and affect. behavior is normal.           Assessment and Plan:   The following treatment plan was discussed        1. Stage 3b chronic kidney disease  Referral to Nephrology    Comp Metabolic Panel    CKD and proteinuria worsening.  refer neph for tx. do lab & f/u for review 6 mo.      2. Essential hypertension, benign  valsartan-hydrochlorothiazide (DIOVAN-HCT) 320-12.5 MG per tablet    amLODIPine (NORVASC) 2.5 MG Tab    felodipine ER (PLENDIL) 2.5 MG TABLET SR 24 HR    felodipine ER (PLENDIL) 10 MG TABLET SR 24 HR    Comp Metabolic Panel    MICROALBUMIN CREAT RATIO URINE    poorly controlled.  refilled meds. add norvasc 2.5 mg daily. monitor bp. f/u 1 mo w/diary, bp cuff for bp ck      3. Proteinuria, unspecified type  Comp Metabolic Panel    MICROALBUMIN CREAT RATIO URINE    newly elevated.  refer neph      4. DM (diabetes mellitus) type II, controlled, with peripheral vascular disorder (HCC)  metFORMIN (GLUCOPHAGE) 500 MG Tab    SITagliptin (JANUVIA) 100 MG Tab    HEMOGLOBIN A1C    Comp Metabolic Panel    stable on meds. followed by endocrinolgy. RF metfromin,  januvia. tam lab and f/u for review 6 mo      5. Hyperlipidemia LDL goal <70  Lipid Profile    LP w/HDL, LDL controlled.  continue triglide. enc pt to improve low carb diet and exerciser as able.      6. Gastroesophageal reflux disease without esophagitis  rabeprazole (ACIPHEX) 20 MG tablet    stable; refilled all meds.        7. Chronic pain of both knees  Referral to Orthopedics    refer ortho for tx of her micky knee pain.  no nsaids.      8. Chronic obstructive pulmonary disease with acute exacerbation (HCC)  BREO  ELLIPTA 200-25 MCG/ACT AEROSOL POWDER, BREATH ACTIVATED    followed by pulm.  refill breo ellipta              Please note that this dictation was created using voice recognition software. I have made every reasonable attempt to correct obvious errors, but I expect that there are errors of grammar and possibly content that I did not discover before finalizing the note.      Attestation      Verbal consent was acquired by the patient to use [employer] ambient listening note generation during this visit Yes

## 2024-06-05 ENCOUNTER — APPOINTMENT (RX ONLY)
Dept: URBAN - METROPOLITAN AREA CLINIC 22 | Facility: CLINIC | Age: 89
Setting detail: DERMATOLOGY
End: 2024-06-05

## 2024-06-05 DIAGNOSIS — L81.4 OTHER MELANIN HYPERPIGMENTATION: ICD-10-CM

## 2024-06-05 DIAGNOSIS — L57.0 ACTINIC KERATOSIS: ICD-10-CM

## 2024-06-05 DIAGNOSIS — Z71.89 OTHER SPECIFIED COUNSELING: ICD-10-CM

## 2024-06-05 PROBLEM — D48.5 NEOPLASM OF UNCERTAIN BEHAVIOR OF SKIN: Status: ACTIVE | Noted: 2024-06-05

## 2024-06-05 PROCEDURE — ? DEFER

## 2024-06-05 PROCEDURE — 99203 OFFICE O/P NEW LOW 30 MIN: CPT | Mod: 25

## 2024-06-05 PROCEDURE — 17003 DESTRUCT PREMALG LES 2-14: CPT

## 2024-06-05 PROCEDURE — ? COUNSELING

## 2024-06-05 PROCEDURE — ? LIQUID NITROGEN

## 2024-06-05 PROCEDURE — 17000 DESTRUCT PREMALG LESION: CPT

## 2024-06-05 ASSESSMENT — LOCATION DETAILED DESCRIPTION DERM
LOCATION DETAILED: NASAL SUPRATIP
LOCATION DETAILED: RIGHT CENTRAL MALAR CHEEK
LOCATION DETAILED: NASAL DORSUM
LOCATION DETAILED: LEFT LATERAL FOREHEAD
LOCATION DETAILED: LEFT CENTRAL MALAR CHEEK
LOCATION DETAILED: RIGHT SUPERIOR OCCIPITAL SCALP

## 2024-06-05 ASSESSMENT — LOCATION SIMPLE DESCRIPTION DERM
LOCATION SIMPLE: NOSE
LOCATION SIMPLE: POSTERIOR SCALP
LOCATION SIMPLE: LEFT CHEEK
LOCATION SIMPLE: RIGHT CHEEK
LOCATION SIMPLE: LEFT FOREHEAD

## 2024-06-05 ASSESSMENT — LOCATION ZONE DERM
LOCATION ZONE: FACE
LOCATION ZONE: SCALP
LOCATION ZONE: NOSE

## 2024-06-05 NOTE — PROCEDURE: DEFER
Detail Level: Simple
Instructions (Optional): Patient declined biopsy today. Explained concerning for skin cancer and needs to be evaluated. She reports she will return to clinic if she decides to get treated. If skin cancer and left untreated could spread and lead to  more advanced disease.
Introduction Text (Please End With A Colon): Biopsy;
X Size Of Lesion In Cm (Optional): 0

## 2024-06-15 DIAGNOSIS — I10 ESSENTIAL HYPERTENSION, BENIGN: ICD-10-CM

## 2024-06-15 DIAGNOSIS — E11.51 DM (DIABETES MELLITUS) TYPE II, CONTROLLED, WITH PERIPHERAL VASCULAR DISORDER (HCC): ICD-10-CM

## 2024-06-15 DIAGNOSIS — J43.8 OTHER EMPHYSEMA (HCC): ICD-10-CM

## 2024-06-15 DIAGNOSIS — K21.9 GASTROESOPHAGEAL REFLUX DISEASE WITHOUT ESOPHAGITIS: ICD-10-CM

## 2024-06-17 NOTE — TELEPHONE ENCOUNTER
Received request via: Pharmacy    Was the patient seen in the last year in this department? Yes    Does the patient have an active prescription (recently filled or refills available) for medication(s) requested? No    [facility] Name[facility]    Does the patient have [facility] and need 100 day supply (blood pressure, diabetes and cholesterol meds only)? Patient does not have SCP

## 2024-06-19 DIAGNOSIS — I10 ESSENTIAL HYPERTENSION, BENIGN: ICD-10-CM

## 2024-06-19 RX ORDER — SITAGLIPTIN 100 MG/1
100 TABLET, FILM COATED ORAL
Qty: 90 TABLET | Refills: 1 | Status: SHIPPED | OUTPATIENT
Start: 2024-06-19

## 2024-06-19 RX ORDER — AMLODIPINE BESYLATE 2.5 MG/1
2.5 TABLET ORAL DAILY
Qty: 90 TABLET | Refills: 3 | Status: SHIPPED | OUTPATIENT
Start: 2024-06-19

## 2024-06-19 RX ORDER — LOSARTAN POTASSIUM AND HYDROCHLOROTHIAZIDE 12.5; 5 MG/1; MG/1
1 TABLET ORAL
Qty: 100 TABLET | Refills: 3 | Status: SHIPPED | OUTPATIENT
Start: 2024-06-19

## 2024-06-19 RX ORDER — RABEPRAZOLE SODIUM 20 MG/1
20 TABLET, DELAYED RELEASE ORAL
Qty: 90 TABLET | Refills: 1 | Status: SHIPPED | OUTPATIENT
Start: 2024-06-19

## 2024-06-19 RX ORDER — ALBUTEROL SULFATE 90 UG/1
2 AEROSOL, METERED RESPIRATORY (INHALATION) EVERY 6 HOURS PRN
Qty: 25.5 G | Refills: 3 | Status: SHIPPED | OUTPATIENT
Start: 2024-06-19

## 2024-06-19 RX ORDER — FELODIPINE 2.5 MG/1
2.5 TABLET, EXTENDED RELEASE ORAL
Qty: 100 TABLET | Refills: 3 | Status: SHIPPED | OUTPATIENT
Start: 2024-06-19

## 2024-08-13 RX ORDER — FENOFIBRATE 160 MG/1
160 TABLET ORAL DAILY
Qty: 90 TABLET | Refills: 1 | Status: SHIPPED | OUTPATIENT
Start: 2024-08-13

## 2024-12-26 DIAGNOSIS — E11.51 DM (DIABETES MELLITUS) TYPE II, CONTROLLED, WITH PERIPHERAL VASCULAR DISORDER (HCC): ICD-10-CM

## 2024-12-27 RX ORDER — SITAGLIPTIN 100 MG/1
100 TABLET, FILM COATED ORAL
Qty: 30 TABLET | Refills: 0 | Status: SHIPPED | OUTPATIENT
Start: 2024-12-27

## 2024-12-27 NOTE — TELEPHONE ENCOUNTER
Received request via: Pharmacy    Was the patient seen in the last year in this department? Yes    Does the patient have an active prescription (recently filled or refills available) for medication(s) requested? No    Pharmacy Name: [id number] - [place], [place] - [address] AT [facility]     Does the patient have [facility] Plus and need 100-day supply? (This applies to ALL medications) Patient does not have SCP

## 2025-06-26 DIAGNOSIS — I10 ESSENTIAL HYPERTENSION, BENIGN: ICD-10-CM

## 2025-06-26 NOTE — TELEPHONE ENCOUNTER
Received request via: Pharmacy    Was the patient seen in the last year in this department? No    Does the patient have an active prescription (recently filled or refills available) for medication(s) requested? No    Ph[facility] Name: [facility]     Does the patient have [facility] and need 100-day supply? (This applies to ALL medications) Patient does not have SCP

## 2025-06-27 RX ORDER — FELODIPINE 2.5 MG/1
2.5 TABLET, EXTENDED RELEASE ORAL
Qty: 15 TABLET | Refills: 0 | Status: SHIPPED | OUTPATIENT
Start: 2025-06-27